# Patient Record
Sex: FEMALE | Race: WHITE | NOT HISPANIC OR LATINO | Employment: OTHER | URBAN - METROPOLITAN AREA
[De-identification: names, ages, dates, MRNs, and addresses within clinical notes are randomized per-mention and may not be internally consistent; named-entity substitution may affect disease eponyms.]

---

## 2019-01-02 ENCOUNTER — APPOINTMENT (OUTPATIENT)
Dept: GENERAL RADIOLOGY | Facility: HOSPITAL | Age: 84
End: 2019-01-02

## 2019-01-02 ENCOUNTER — APPOINTMENT (OUTPATIENT)
Dept: CT IMAGING | Facility: HOSPITAL | Age: 84
End: 2019-01-02

## 2019-01-02 ENCOUNTER — HOSPITAL ENCOUNTER (EMERGENCY)
Facility: HOSPITAL | Age: 84
Discharge: HOME | End: 2019-01-02
Attending: EMERGENCY MEDICINE | Admitting: EMERGENCY MEDICINE

## 2019-01-02 VITALS
HEIGHT: 64 IN | BODY MASS INDEX: 23.05 KG/M2 | OXYGEN SATURATION: 93 % | WEIGHT: 135 LBS | DIASTOLIC BLOOD PRESSURE: 86 MMHG | RESPIRATION RATE: 17 BRPM | TEMPERATURE: 98.2 F | HEART RATE: 75 BPM | SYSTOLIC BLOOD PRESSURE: 156 MMHG

## 2019-01-02 DIAGNOSIS — S02.2XXA CLOSED FRACTURE OF NASAL BONE, INITIAL ENCOUNTER: ICD-10-CM

## 2019-01-02 DIAGNOSIS — T07.XXXA MULTIPLE CONTUSIONS: ICD-10-CM
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
## 2019-01-02 PROCEDURE — 99284 EMERGENCY DEPT VISIT MOD MDM: CPT

## 2019-01-02 PROCEDURE — 72125 CT NECK SPINE W/O DYE: CPT

## 2019-01-02 PROCEDURE — 73110 X-RAY EXAM OF WRIST: CPT

## 2019-01-02 PROCEDURE — 25010000002 TDAP 5-2.5-18.5 LF-MCG/0.5 SUSPENSION: Performed by: EMERGENCY MEDICINE

## 2019-01-02 PROCEDURE — 70486 CT MAXILLOFACIAL W/O DYE: CPT

## 2019-01-02 PROCEDURE — 73130 X-RAY EXAM OF HAND: CPT

## 2019-01-02 PROCEDURE — 70450 CT HEAD/BRAIN W/O DYE: CPT

## 2019-01-02 PROCEDURE — 90715 TDAP VACCINE 7 YRS/> IM: CPT | Performed by: EMERGENCY MEDICINE

## 2019-01-02 PROCEDURE — 90471 IMMUNIZATION ADMIN: CPT | Performed by: EMERGENCY MEDICINE

## 2019-01-02 RX ORDER — OMEPRAZOLE 20 MG/1
20 CAPSULE, DELAYED RELEASE ORAL DAILY
Status: ON HOLD | COMMUNITY
End: 2022-09-11

## 2019-01-02 RX ORDER — BACITRACIN, NEOMYCIN, POLYMYXIN B 400; 3.5; 5 [USP'U]/G; MG/G; [USP'U]/G
1 OINTMENT TOPICAL ONCE
Status: COMPLETED | OUTPATIENT
Start: 2019-01-02 | End: 2019-01-02

## 2019-01-02 RX ORDER — PANTOPRAZOLE SODIUM 40 MG/1
40 TABLET, DELAYED RELEASE ORAL DAILY
Status: ON HOLD | COMMUNITY
End: 2022-09-11

## 2019-01-02 RX ORDER — AMLODIPINE BESYLATE 2.5 MG/1
2.5 TABLET ORAL DAILY
Status: ON HOLD | COMMUNITY
End: 2022-09-11

## 2019-01-02 RX ADMIN — BACITRACIN, NEOMYCIN, POLYMYXIN B 1 G: 400; 3.5; 5 OINTMENT TOPICAL at 21:43

## 2019-01-02 RX ADMIN — TETANUS TOXOID, REDUCED DIPHTHERIA TOXOID AND ACELLULAR PERTUSSIS VACCINE, ADSORBED 0.5 ML: 5; 2.5; 8; 8; 2.5 SUSPENSION INTRAMUSCULAR at 20:08

## 2019-01-03 NOTE — ED PROVIDER NOTES
Subjective   89-year-old white female arrives from [facility] complaining of injury sustained in fall.  Patient states she was walking in her permanent when she tripped and fell.  She is not sure how it occurred but she denies any syncope or loss of consciousness.  According to her daughter, she is supposed to use her walker and at this particular incident she did not use her walker.  She denies any other areas of complaint except her nose, her right wrist, and to some extent her neck.  She denies any chest pain, hip pain, low back pain, or other extremity complaints including her lower or upper extremities.  Patient has no other complaints.        Fall   Mechanism of injury: fall    Injury location:  Face  Facial injury location:  Nose  Incident location:  Home  Arrived directly from scene: yes    Fall:     Fall occurred:  Walking    Height of fall:  Self    Impact surface:  Unable to specify    Point of impact:  Face    Entrapped after fall: no    Suspicion of alcohol use: no    Suspicion of drug use: no    Tetanus status:  Unknown  Prior to arrival data:     Bystander interventions:  None    Loss of consciousness: no    Associated symptoms: neck pain    Associated symptoms: no abdominal pain, no back pain, no chest pain, no headaches, no loss of consciousness and no vomiting        Review of Systems   Cardiovascular: Negative for chest pain.   Gastrointestinal: Negative for abdominal pain and vomiting.   Musculoskeletal: Positive for neck pain. Negative for back pain.   Neurological: Negative for loss of consciousness and headaches.   All other systems reviewed and are negative.      Past Medical History:   Diagnosis Date   • Depression    • Disease of thyroid gland    • Hypertension        Allergies   Allergen Reactions   • Levaquin [Levofloxacin] Other (See Comments)     Other     • Penicillins Other (See Comments)     Other     • Sulfa Antibiotics Other (See Comments)     Other          No past surgical history on file.    No family history on file.    Social History     Socioeconomic History   • Marital status:      Spouse name: Not on file   • Number of children: Not on file   • Years of education: Not on file   • Highest education level: Not on file           Objective   Physical Exam   Constitutional: She appears well-developed and well-nourished.   HENT:   Head: Normocephalic.   Right Ear: External ear normal.   Left Ear: External ear normal.   Nasal nasal swelling.  There is no septal hematoma.  There is no active bleeding.  Teeth show no fracture or avulsion.  There is no malocclusion.  No active bleeding.  Facial and oral exam otherwise normal.   Eyes: Conjunctivae are normal. Pupils are equal, round, and reactive to light.   Neck: Normal range of motion. Neck supple.   Nontender C-spine   Cardiovascular: Normal rate, regular rhythm and normal heart sounds.   No murmur heard.  Pulmonary/Chest: Effort normal and breath sounds normal. No stridor. No respiratory distress.   Abdominal: Soft. Bowel sounds are normal. She exhibits no distension. There is no tenderness.   Musculoskeletal:   Right distal radius there is a 2 cm laceration at the radial aspect of the distal one third of the right wrist and forearm area.  There is no active bleeding or foreign body.  Neurovascular status normal pulses 4+ at radius.  There is normal range of motion.  Radial, ulnar, median sensory motor nerve intact.  The remainder of the upper extremity exam is normal.  Lower extremities there is no tenderness or deformity at hips.  There is normal range of motion at hips and knees.  There is no area of tenderness on palpation.  Neurovascular status is normal to both extremities.  Back: There is no vertebral tenderness whatsoever.   Neurological: She is alert.   Skin: Skin is warm and dry. Capillary refill takes less than 2 seconds.   Psychiatric: She has a normal mood and affect. Her behavior is normal.    Nursing note and vitals reviewed.      Laceration Repair  Date/Time: 1/2/2019 9:23 PM  Performed by: Mitchell Neal PA  Authorized by: Uli Gore MD     Consent:     Consent obtained:  Verbal    Consent given by:  Patient  Anesthesia (see MAR for exact dosages):     Anesthesia method:  Local infiltration    Local anesthetic:  Lidocaine 1% w/o epi  Laceration details:     Location:  Shoulder/arm    Shoulder/arm location:  R lower arm    Length (cm):  2.5  Repair type:     Repair type:  Simple  Pre-procedure details:     Preparation:  Patient was prepped and draped in usual sterile fashion and imaging obtained to evaluate for foreign bodies  Exploration:     Wound exploration: wound explored through full range of motion      Wound extent: no muscle damage noted, no nerve damage noted, no tendon damage noted and no vascular damage noted      Contaminated: no    Treatment:     Area cleansed with:  Betadine    Amount of cleaning:  Standard    Irrigation method:  Tap  Skin repair:     Repair method:  Sutures    Suture size:  5-0    Suture material:  Prolene    Suture technique:  Simple interrupted    Number of sutures:  5  Approximation:     Approximation:  Close    Vermilion border: well-aligned    Post-procedure details:     Dressing:  Antibiotic ointment    Patient tolerance of procedure:  Tolerated well, no immediate complications  Comments:      Pt with no complications               ED Course          No results found for this or any previous visit (from the past 24 hour(s)).  Note: In addition to lab results from this visit, the labs listed above may include labs taken at another facility or during a different encounter within the last 24 hours. Please correlate lab times with ED admission and discharge times for further clarification of the services performed during this visit.    XR Hand 3+ View Right   Final Result   1. No acute fracture or dislocation of the right hand.    2. Mild soft tissue swelling of  the lateral aspect of the distal forearm.    3. Mild erosive changes identified at the base of the distal fifth phalanx,    suggestive of erosive arthropathy. Additional degenerative change/arthropathy    is noted above.    4. Additional findings described above.      THIS DOCUMENT HAS BEEN ELECTRONICALLY SIGNED BY BINDU YODER MD      XR Wrist 3+ View Right   Final Result   1. No acute fracture or dislocation of the right wrist.    2. 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 Additional degenerative change/arthropathy is    noted above.    3. A tiny mineralized loose body or calcification identified distal to the    ulna.    4. Cystic change and cortical irregularity is identified of the ulnar styloid    process, which is likely degenerative or due to prior trauma.    5. Mild soft tissue swelling of the lateral aspect of the distal forearm.      THIS DOCUMENT HAS BEEN ELECTRONICALLY SIGNED BY BINDU YODER MD      CT Cervical Spine Without Contrast   Preliminary Result   1. No acute intracranial abnormality.    2. Comminuted fracture of the nasal bones and fracture of the bony nasal   septum. Fracture of the anterior nasal spine of the maxilla.   3. Cervical spine degenerative change without acute fracture.       DICTATED:   1/2/2019   EDITED/ls :   1/2/2019           CT Facial Bones Without Contrast   Preliminary Result   1. No acute intracranial abnormality.    2. Comminuted fracture of the nasal bones and fracture of the bony nasal   septum. Fracture of the anterior nasal spine of the maxilla.   3. Cervical spine degenerative change without acute fracture.       DICTATED:   1/2/2019   EDITED/ls :   1/2/2019           CT Head Without Contrast   Preliminary Result   1. No acute intracranial abnormality.    2. Comminuted fracture of the nasal bones and fracture of the bony nasal   septum. Fracture of the anterior nasal spine of the maxilla.    3. Cervical spine degenerative change without acute fracture.       DICTATED:   1/2/2019   EDITED/ls :   1/2/2019             Vitals:    01/02/19 2000 01/02/19 2010 01/02/19 2100 01/02/19 2127   BP: 170/83  156/86    Pulse:  77  75   Resp:       Temp:       TempSrc:       SpO2: 94%  93%    Weight:       Height:         Medications   neomycin-bacitracin-polymyxin (NEOSPORIN) ointment 1 g (not administered)   Tdap (BOOSTRIX) injection 0.5 mL (0.5 mL Intramuscular Given 1/2/19 2008)     ECG/EMG Results (last 24 hours)     ** No results found for the last 24 hours. **              [facility]      Final diagnoses:   Fall, initial encounter   Multiple contusions   Closed fracture of nasal bone, initial encounter            Mitchell Neal PA  01/02/19 2134

## 2019-01-03 NOTE — DISCHARGE INSTRUCTIONS
Use Tylenol 325 mg every 6 hours as needed for pain.  You may also use Motrin 400 mg every 6 hours as needed for pain.  Discontinue this medication if stomach upset or gastrointestinal bleeding.  You may also use hydrocodone that I have supplied you if no improvement with Motrin or Tylenol.  Follow-up with your doctor as needed.  Return if pain worsens or problems sooner.

## 2022-09-11 ENCOUNTER — APPOINTMENT (OUTPATIENT)
Dept: GENERAL RADIOLOGY | Facility: HOSPITAL | Age: 87
End: 2022-09-11

## 2022-09-11 ENCOUNTER — APPOINTMENT (OUTPATIENT)
Dept: CT IMAGING | Facility: HOSPITAL | Age: 87
End: 2022-09-11

## 2022-09-11 ENCOUNTER — HOSPITAL ENCOUNTER (INPATIENT)
Facility: HOSPITAL | Age: 87
LOS: 5 days | Discharge: NURSING, REHAB OR LONG-TERM CARE | End: 2022-09-16
Attending: EMERGENCY MEDICINE | Admitting: PEDIATRICS

## 2022-09-11 DIAGNOSIS — I63.511 ACUTE ISCHEMIC RIGHT MCA STROKE: Primary | ICD-10-CM

## 2022-09-11 DIAGNOSIS — Z66 DNR (DO NOT RESUSCITATE): ICD-10-CM

## 2022-09-11 DIAGNOSIS — I10 ELEVATED BLOOD PRESSURE READING WITH DIAGNOSIS OF HYPERTENSION: ICD-10-CM

## 2022-09-11 PROBLEM — I63.9 ACUTE CVA (CEREBROVASCULAR ACCIDENT): Status: ACTIVE | Noted: 2022-09-11

## 2022-09-11 PROBLEM — F32.A MILD DEPRESSION: Status: ACTIVE | Noted: 2022-09-11

## 2022-09-11 PROBLEM — E78.5 HLD (HYPERLIPIDEMIA): Status: ACTIVE | Noted: 2022-09-11

## 2022-09-11 PROBLEM — R53.1 ACUTE LEFT-SIDED WEAKNESS: Status: ACTIVE | Noted: 2022-09-11

## 2022-09-11 PROBLEM — E03.9 HYPOTHYROIDISM (ACQUIRED): Status: ACTIVE | Noted: 2022-09-11

## 2022-09-11 LAB
ALT SERPL W P-5'-P-CCNC: 11 U/L (ref 1–33)
APTT PPP: 31.4 SECONDS (ref 22–39)
AST SERPL-CCNC: 23 U/L (ref 1–32)
BACTERIA UR QL AUTO: ABNORMAL /HPF
BASOPHILS # BLD AUTO: 0.06 10*3/MM3 (ref 0–0.2)
BASOPHILS NFR BLD AUTO: 0.7 % (ref 0–1.5)
BILIRUB UR QL STRIP: NEGATIVE
BUN BLDA-MCNC: 19 MG/DL (ref 8–26)
CA-I BLDA-SCNC: 1.33 MMOL/L (ref 1.2–1.32)
CHLORIDE BLDA-SCNC: 101 MMOL/L (ref 98–109)
CLARITY UR: CLEAR
CO2 BLDA-SCNC: 28 MMOL/L (ref 24–29)
COLOR UR: YELLOW
CREAT BLDA-MCNC: 0.6 MG/DL (ref 0.6–1.3)
DEPRECATED RDW RBC AUTO: 45.1 FL (ref 37–54)
EGFRCR SERPLBLD CKD-EPI 2021: 82.3 ML/MIN/1.73
EOSINOPHIL # BLD AUTO: 0.15 10*3/MM3 (ref 0–0.4)
EOSINOPHIL NFR BLD AUTO: 1.8 % (ref 0.3–6.2)
ERYTHROCYTE [DISTWIDTH] IN BLOOD BY AUTOMATED COUNT: 14 % (ref 12.3–15.4)
GLUCOSE BLDC GLUCOMTR-MCNC: 104 MG/DL (ref 70–130)
GLUCOSE BLDC GLUCOMTR-MCNC: 157 MG/DL (ref 70–130)
GLUCOSE UR STRIP-MCNC: NEGATIVE MG/DL
HCT VFR BLD AUTO: 40.4 % (ref 34–46.6)
HCT VFR BLDA CALC: 42 % (ref 38–51)
HGB BLD-MCNC: 13.2 G/DL (ref 12–15.9)
HGB BLDA-MCNC: 14.3 G/DL (ref 12–17)
HGB UR QL STRIP.AUTO: NEGATIVE
HOLD SPECIMEN: NORMAL
HYALINE CASTS UR QL AUTO: ABNORMAL /LPF
IMM GRANULOCYTES # BLD AUTO: 0.02 10*3/MM3 (ref 0–0.05)
IMM GRANULOCYTES NFR BLD AUTO: 0.2 % (ref 0–0.5)
INR PPP: 1.3 (ref 0.8–1.2)
KETONES UR QL STRIP: NEGATIVE
LEUKOCYTE ESTERASE UR QL STRIP.AUTO: ABNORMAL
LYMPHOCYTES # BLD AUTO: 1.2 10*3/MM3 (ref 0.7–3.1)
LYMPHOCYTES NFR BLD AUTO: 14.4 % (ref 19.6–45.3)
MCH RBC QN AUTO: 28.6 PG (ref 26.6–33)
MCHC RBC AUTO-ENTMCNC: 32.7 G/DL (ref 31.5–35.7)
MCV RBC AUTO: 87.4 FL (ref 79–97)
MONOCYTES # BLD AUTO: 0.44 10*3/MM3 (ref 0.1–0.9)
MONOCYTES NFR BLD AUTO: 5.3 % (ref 5–12)
NEUTROPHILS NFR BLD AUTO: 6.46 10*3/MM3 (ref 1.7–7)
NEUTROPHILS NFR BLD AUTO: 77.6 % (ref 42.7–76)
NITRITE UR QL STRIP: NEGATIVE
NRBC BLD AUTO-RTO: 0 /100 WBC (ref 0–0.2)
PH UR STRIP.AUTO: 6.5 [PH] (ref 5–8)
PLATELET # BLD AUTO: 282 10*3/MM3 (ref 140–450)
PMV BLD AUTO: 9.8 FL (ref 6–12)
POTASSIUM BLDA-SCNC: 4 MMOL/L (ref 3.5–4.9)
PROT UR QL STRIP: NEGATIVE
PROTHROMBIN TIME: 15 SECONDS (ref 12.8–15.2)
RBC # BLD AUTO: 4.62 10*6/MM3 (ref 3.77–5.28)
RBC # UR STRIP: ABNORMAL /HPF
REF LAB TEST METHOD: ABNORMAL
SODIUM BLD-SCNC: 140 MMOL/L (ref 138–146)
SP GR UR STRIP: 1.06 (ref 1–1.03)
SQUAMOUS #/AREA URNS HPF: ABNORMAL /HPF
TROPONIN T SERPL-MCNC: <0.01 NG/ML (ref 0–0.03)
UROBILINOGEN UR QL STRIP: ABNORMAL
WBC # UR STRIP: ABNORMAL /HPF
WBC NRBC COR # BLD: 8.33 10*3/MM3 (ref 3.4–10.8)
WHOLE BLOOD HOLD COAG: NORMAL
WHOLE BLOOD HOLD SPECIMEN: NORMAL

## 2022-09-11 PROCEDURE — 82962 GLUCOSE BLOOD TEST: CPT

## 2022-09-11 PROCEDURE — 99223 1ST HOSP IP/OBS HIGH 75: CPT | Performed by: NURSE PRACTITIONER

## 2022-09-11 PROCEDURE — 85014 HEMATOCRIT: CPT

## 2022-09-11 PROCEDURE — 84460 ALANINE AMINO (ALT) (SGPT): CPT | Performed by: EMERGENCY MEDICINE

## 2022-09-11 PROCEDURE — 70496 CT ANGIOGRAPHY HEAD: CPT

## 2022-09-11 PROCEDURE — 0042T HC CT CEREBRAL PERFUSION W/WO CONTRAST: CPT

## 2022-09-11 PROCEDURE — 71045 X-RAY EXAM CHEST 1 VIEW: CPT

## 2022-09-11 PROCEDURE — 85025 COMPLETE CBC W/AUTO DIFF WBC: CPT | Performed by: EMERGENCY MEDICINE

## 2022-09-11 PROCEDURE — 93005 ELECTROCARDIOGRAM TRACING: CPT | Performed by: EMERGENCY MEDICINE

## 2022-09-11 PROCEDURE — 84484 ASSAY OF TROPONIN QUANT: CPT | Performed by: EMERGENCY MEDICINE

## 2022-09-11 PROCEDURE — 99285 EMERGENCY DEPT VISIT HI MDM: CPT

## 2022-09-11 PROCEDURE — 99223 1ST HOSP IP/OBS HIGH 75: CPT | Performed by: HOSPITALIST

## 2022-09-11 PROCEDURE — 4A03X5D MEASUREMENT OF ARTERIAL FLOW, INTRACRANIAL, EXTERNAL APPROACH: ICD-10-PCS | Performed by: RADIOLOGY

## 2022-09-11 PROCEDURE — 85610 PROTHROMBIN TIME: CPT

## 2022-09-11 PROCEDURE — 85730 THROMBOPLASTIN TIME PARTIAL: CPT | Performed by: EMERGENCY MEDICINE

## 2022-09-11 PROCEDURE — 70498 CT ANGIOGRAPHY NECK: CPT

## 2022-09-11 PROCEDURE — 81001 URINALYSIS AUTO W/SCOPE: CPT | Performed by: EMERGENCY MEDICINE

## 2022-09-11 PROCEDURE — 70450 CT HEAD/BRAIN W/O DYE: CPT

## 2022-09-11 PROCEDURE — 0 IOPAMIDOL PER 1 ML: Performed by: EMERGENCY MEDICINE

## 2022-09-11 PROCEDURE — 80047 BASIC METABLC PNL IONIZED CA: CPT

## 2022-09-11 PROCEDURE — 84450 TRANSFERASE (AST) (SGOT): CPT | Performed by: EMERGENCY MEDICINE

## 2022-09-11 RX ORDER — ATORVASTATIN CALCIUM 40 MG/1
80 TABLET, FILM COATED ORAL NIGHTLY
Status: DISCONTINUED | OUTPATIENT
Start: 2022-09-11 | End: 2022-09-12

## 2022-09-11 RX ORDER — SODIUM CHLORIDE 0.9 % (FLUSH) 0.9 %
10 SYRINGE (ML) INJECTION EVERY 12 HOURS SCHEDULED
Status: DISCONTINUED | OUTPATIENT
Start: 2022-09-11 | End: 2022-09-16 | Stop reason: HOSPADM

## 2022-09-11 RX ORDER — SODIUM CHLORIDE 0.9 % (FLUSH) 0.9 %
10 SYRINGE (ML) INJECTION AS NEEDED
Status: DISCONTINUED | OUTPATIENT
Start: 2022-09-11 | End: 2022-09-16 | Stop reason: HOSPADM

## 2022-09-11 RX ORDER — PANTOPRAZOLE SODIUM 40 MG/1
40 TABLET, DELAYED RELEASE ORAL EVERY MORNING
Status: DISCONTINUED | OUTPATIENT
Start: 2022-09-12 | End: 2022-09-16 | Stop reason: HOSPADM

## 2022-09-11 RX ORDER — ACETAMINOPHEN 325 MG/1
650 TABLET ORAL EVERY 4 HOURS PRN
Status: DISCONTINUED | OUTPATIENT
Start: 2022-09-11 | End: 2022-09-16 | Stop reason: HOSPADM

## 2022-09-11 RX ORDER — ASPIRIN 300 MG/1
300 SUPPOSITORY RECTAL ONCE
Status: COMPLETED | OUTPATIENT
Start: 2022-09-11 | End: 2022-09-11

## 2022-09-11 RX ORDER — ONDANSETRON 4 MG/1
4 TABLET, FILM COATED ORAL EVERY 6 HOURS PRN
Status: DISCONTINUED | OUTPATIENT
Start: 2022-09-11 | End: 2022-09-16 | Stop reason: HOSPADM

## 2022-09-11 RX ORDER — ONDANSETRON 2 MG/ML
4 INJECTION INTRAMUSCULAR; INTRAVENOUS EVERY 6 HOURS PRN
Status: DISCONTINUED | OUTPATIENT
Start: 2022-09-11 | End: 2022-09-16 | Stop reason: HOSPADM

## 2022-09-11 RX ORDER — LABETALOL HYDROCHLORIDE 5 MG/ML
INJECTION, SOLUTION INTRAVENOUS
Status: COMPLETED
Start: 2022-09-11 | End: 2022-09-11

## 2022-09-11 RX ORDER — SERTRALINE HYDROCHLORIDE 25 MG/1
25 TABLET, FILM COATED ORAL DAILY
COMMUNITY

## 2022-09-11 RX ORDER — ASPIRIN 81 MG/1
81 TABLET, CHEWABLE ORAL DAILY
Status: DISCONTINUED | OUTPATIENT
Start: 2022-09-12 | End: 2022-09-16 | Stop reason: HOSPADM

## 2022-09-11 RX ORDER — ACETAMINOPHEN 160 MG/5ML
650 SOLUTION ORAL EVERY 4 HOURS PRN
Status: DISCONTINUED | OUTPATIENT
Start: 2022-09-11 | End: 2022-09-13

## 2022-09-11 RX ORDER — ASPIRIN 300 MG/1
300 SUPPOSITORY RECTAL DAILY
Status: DISCONTINUED | OUTPATIENT
Start: 2022-09-12 | End: 2022-09-13

## 2022-09-11 RX ORDER — LABETALOL HYDROCHLORIDE 5 MG/ML
10 INJECTION, SOLUTION INTRAVENOUS ONCE
Status: COMPLETED | OUTPATIENT
Start: 2022-09-11 | End: 2022-09-11

## 2022-09-11 RX ORDER — ACETAMINOPHEN 650 MG/1
650 SUPPOSITORY RECTAL EVERY 4 HOURS PRN
Status: DISCONTINUED | OUTPATIENT
Start: 2022-09-11 | End: 2022-09-13

## 2022-09-11 RX ORDER — HYDRALAZINE HYDROCHLORIDE 20 MG/ML
10 INJECTION INTRAMUSCULAR; INTRAVENOUS EVERY 6 HOURS PRN
Status: DISCONTINUED | OUTPATIENT
Start: 2022-09-11 | End: 2022-09-16 | Stop reason: HOSPADM

## 2022-09-11 RX ADMIN — LABETALOL 20 MG/4 ML (5 MG/ML) INTRAVENOUS SYRINGE 10 MG: at 13:46

## 2022-09-11 RX ADMIN — IOPAMIDOL 100 ML: 755 INJECTION, SOLUTION INTRAVENOUS at 13:47

## 2022-09-11 RX ADMIN — LABETALOL HYDROCHLORIDE 10 MG: 5 INJECTION, SOLUTION INTRAVENOUS at 13:46

## 2022-09-11 RX ADMIN — NICARDIPINE HYDROCHLORIDE 5 MG/HR: 25 INJECTION, SOLUTION INTRAVENOUS at 15:42

## 2022-09-11 RX ADMIN — ASPIRIN 300 MG: 300 SUPPOSITORY RECTAL at 15:43

## 2022-09-11 NOTE — CONSULTS
Stroke Consult Note    Patient Name: Nehal Parker   MRN: [id number]  Age: 89 y.o.  Sex: female  : 2/15/1926    Primary Care Physician: Brenton Clay MD  Referring Physician: Dr. Dick    TIME STROKE TEAM CALLED: 1320 EST     TIME PATIENT SEEN: 1325 EST    Handedness: Right  Race:     Chief Complaint/Reason for Consultation: Left facial droop, left-sided weakness, slurred speech    HPI:Nehal Parker is a 89-year-old female with a PMH significant for TIA, PE, HTN, HLD, depression, and hypothyroidism who presents to the [facility] ED with complaints of left facial droop, left-sided weakness and slurred speech.  Patient lives in assisted living facility.  The patient's daughter states that she called to check on her mom at 1130 this morning and noticed that her speech was very slurred.  She drove to check on her and noticed that she had facial droop, was drooling out of the left side of her mouth, had slurred speech and left-sided weakness.  EMS was called and she was brought to [facility] for further evaluation.  Per EMS, she was noted to be hypertensive in route with systolic blood pressure in the 200s.    On arrival to [facility], BP is 194/96.  NIH is 6.  CT head with no definitive acute abnormality.  CTP with an area of abnormal perfusion in the RMCA territory.  12 mL of core infarct noted with 27 mL of ischemic penumbra.  CTA H/N with multiple areas of high-grade narrowing or occlusion and the distal M2 branches of the distal RMCA superior division.  Multiple focal high-grade stenosis noted in the bilateral PCA arteries.  Imaging discussed with Dr. Pendleton, Dr. Madrid, the patient, and her daughters.  Patient's baseline MRS Is 3.  Risk of intervention is greater than the benefit.  We will pursue medical management.  Patient will be admitted to the hospital medicine service for further evaluation.    Of note, the patient was recently taken off Xarelto in the last 3 months due to increased falls at home.  She  was on Xarelto for history of multiple pulmonary emboli.     Last Known Normal Date/Time: Unknown EST     Review of Systems   Constitutional: Negative for chills and fever.   HENT: Positive for trouble swallowing.    Eyes: Negative for photophobia and visual disturbance.   Respiratory: Negative for cough and shortness of breath.    Gastrointestinal: Negative for nausea and vomiting.   Genitourinary: Negative.    Musculoskeletal: Positive for gait problem.   Neurological: Positive for facial asymmetry, speech difficulty and weakness.   Psychiatric/Behavioral: Negative.       Past Medical History:   Diagnosis Date   • Depression    • Disease of thyroid gland    • Hypertension      No past surgical history on file.  No family history on file.  Social History     Socioeconomic History   • Marital status:      Allergies   Allergen Reactions   • Levaquin [Levofloxacin] Other (See Comments)     Other     • Penicillins Other (See Comments)     Other     • Sulfa Antibiotics Other (See Comments)     Other       Prior to Admission medications    Medication Sig Start Date End Date Taking? Authorizing Provider   amLODIPine (NORVASC) 2.5 MG tablet Take 2.5 mg by mouth Daily.    Rome Curtis MD   Escitalopram Oxalate (LEXAPRO PO) Take  by mouth.    Rome Curtis MD   HYDROcodone-acetaminophen (HYCET) 7.5-325 MG/15ML solution Take 5 mL by mouth Every 6 (Six) Hours As Needed for Moderate Pain . 1/2/19   Uli Gore MD   LEVOTHYROXINE SODIUM PO Take  by mouth.    Rome Curtis MD   omeprazole (priLOSEC) 20 MG capsule Take 20 mg by mouth Daily.    Rome Curtis MD   pantoprazole (PROTONIX) 40 MG EC tablet Take 40 mg by mouth Daily.    Rome Curtis MD   SIMVASTATIN PO Take  by mouth.    Rome Curtis MD         Temp:  [98 °F (36.7 °C)] 98 °F (36.7 °C)  Heart Rate:  [62-67] 67  Resp:  [18] 18  BP: (194-213)/() 213/99  Neurological Exam  Mental Status  Awake, alert and  oriented to person, place and time.Alert. Oriented to person, place, and time. Mild dysarthria present. Expressive aphasia present. Mild intermittent word finding difficulty.    Cranial Nerves  CN II: Visual acuity is normal. Visual fields full to confrontation.  CN III, IV, VI: Extraocular movements intact bilaterally. Normal lids and orbits bilaterally. Pupils equal round and reactive to light bilaterally.  CN V: Facial sensation is normal.  CN VII:  Left: There is central facial weakness.  CN IX, X: Palate elevates symmetrically  CN XI: Shoulder shrug strength is normal.  CN XII: Tongue midline without atrophy or fasciculations.    Motor   Strength is 5/5 in all four extremities except as noted.  LUE drift, weakness noted with dorsiflexion of the left foot.    Reflexes                                            Right                      Left  Plantar                           Downgoing                Downgoing    Coordination  Right: Finger-to-nose normal.Left: Finger-to-nose normal.    Gait    Not observed.      Physical Exam  Constitutional:       Comments: Frail elderly female in NAD   Eyes:      General: Lids are normal.      Extraocular Movements: Extraocular movements intact.      Pupils: Pupils are equal, round, and reactive to light.   Cardiovascular:      Rate and Rhythm: Normal rate and regular rhythm.   Pulmonary:      Effort: Pulmonary effort is normal. No respiratory distress.   Abdominal:      General: There is no distension.      Palpations: Abdomen is soft.   Musculoskeletal:      Cervical back: Normal range of motion and neck supple.      Right lower leg: No edema.      Left lower leg: No edema.   Skin:     General: Skin is warm and dry.      Capillary Refill: Capillary refill takes less than 2 seconds.   Neurological:      Mental Status: She is alert and oriented to person, place, and time.      Cranial Nerves: Cranial nerve deficit and dysarthria present.      Motor: Weakness present.    Psychiatric:         Mood and Affect: Mood normal.         Behavior: Behavior normal.         Acute Stroke Data    Thrombolytic Inclusion / Exclusion Criteria    Time: 1324 EDT  Person Administering Scale: ZEYNEP Garces    Inclusion Criteria  [x]   18 years of age or greater   []   Onset of symptoms < 4.5 hours before beginning treatment (stroke onset = time patient was last seen well or without symptoms).   []   Diagnosis of acute ischemic stroke causing measurable disabling deficit (Complete Hemianopia, Any Aphasia, Visual or Sensory Extinction, Any weakness limiting sustained effort against gravity)   []   Any remaining deficit considered potentially disabling in view of patient and practitioner   Exclusion criteria (Do not proceed with Alteplase if any are checked under exclusion criteria)  [x]   Onset unknown or GREATER than 4.5 hours   []   ICH on CT/MRI   []   CT demonstrates hypodensity representing acute or subacute infarct   []   Significant head trauma or prior stroke in the previous 3 months   []   Symptoms suggestive of subarachnoid hemorrhage   []   History of un-ruptured intracranial aneurysm GREATER than 10 mm   []   Recent intracranial or intraspinal surgery within the last 3 months   []   Arterial puncture at a non-compressible site in the previous 7 days   []   Active internal bleeding   []   Acute bleeding tendency   []   Platelet count LESS than 100,000 for known hematological diseases such as leukemia, thrombocytopenia or chronic cirrhosis   []   Current use of anticoagulant with INR GREATER than 1.7 or PT GREATER than 15 seconds, aPTT GREATER than 40 seconds   []   Heparin received within 48 hours, resulting in abnormally elevated aPTT GREATER than upper limit of normal   []   Current use of direct thrombin inhibitors or direct factor Xa inhibitors in the past 48 hours   []   Elevated blood pressure refractory to treatment (systolic GREATER than 185 mm/Hg or diastolic  GREATER  than 110 mm/Hg   []   Suspected infective endocarditis and aortic arch dissection   []   Current use of therapeutic treatment dose of low-molecular-weight heparin (LMWH) within the previous 24 hours   []   Structural GI malignancy or bleed   Relative exclusion for all patients  []   Only minor non-disabling symptoms   []   Pregnancy   []   Seizure at onset with postictal residual neurological impairments   []   Major surgery or previous trauma within past 14 days   []   History of previous spontaneous ICH, intracranial neoplasm, or AV malformation   []   Postpartum (within previous 14 days)   []   Recent GI or urinary tract hemorrhage (within previous 21 days)   []   Recent acute MI (within previous 3 months)   []   History of un-ruptured intracranial aneurysm LESS than 10 mm   []   History of ruptured intracranial aneurysm   []   Blood glucose LESS than 50 mg/dL (2.7 mmol/L)   []   Dural puncture within the last 7 days   []   Known GREATER than 10 cerebral microbleeds   Additional exclusions for patients with symptoms onset between 3 and 4.5 hours.  []   Age > 80.   []   On any anticoagulants regardless of INR  >>> Warfarin (Coumadin), Heparin, Enoxaparin (Lovenox), fondaparinux (Arixtra), bivalirudin (Angiomax), Argatroban, dabigatran (Pradaxa), rivaroxaban (Xarelto), or apixaban (Eliquis)   []   Severe stroke (NIHSS > 25).   []   History of BOTH diabetes and previous ischemic stroke.   []   The risks and benefits have been discussed with the patient or family related to the administration of IV thrombolytic therapy for stroke symptoms.   []   I have discussed and reviewed the patient's case and imaging with the attending prior to IV thrombolytic therapy.    Time IV thrombolytic administered       Hospital Meds:  Scheduled- aspirin, 300 mg, Rectal, Once      Infusions- niCARdipine, 5-15 mg/hr       PRNs- hydrALAZINE  •  sodium chloride    Functional Status Prior to Current Stroke/[place] Score:   MODIFIED ELIAZRA  SCALE (to be assessed for each patient having history of stroke) []Stroke history but not assessed  []0: No symptoms at all  []1: No significant disability despite symptoms  []2: Slight disability  [x]3: Moderate disability  []4: Moderately severe disability  []5: Severe disability  []6: Death         NIH Stroke Scale  Time: 1325 EDT  Person Administering Scale: ZEYNEP Garces  Interval: baseline  1a. Level of Consciousness: 0-->Alert, keenly responsive  1b. LOC Questions: 0-->Answers both questions correctly  1c. LOC Commands: 0-->Performs both tasks correctly  2. Best Gaze: 1-->Partial gaze palsy, gaze is abnormal in one or both eyes, but forced deviation or total gaze paresis is not present  3. Visual: 0-->No visual loss  4. Facial Palsy: 2-->Partial paralysis (total or near-total paralysis of lower face)  5a. Motor Arm, Left: 1-->Drift, limb holds 90 (or 45) degrees, but drifts down before full 10 seconds, does not hit bed or other support  5b. Motor Arm, Right: 0-->No drift, limb holds 90 (or 45) degrees for full 10 secs  6a. Motor Leg, Left: 0-->No drift, leg holds 30 degree position for full 5 secs  6b. Motor Leg, Right: 0-->No drift, leg holds 30 degree position for full 5 secs  7. Limb Ataxia: 0-->Absent  8. Sensory: 0-->Normal, no sensory loss  9. Best Language: 1-->Mild-to-moderate aphasia, some obvious loss of fluency or facility of comprehension, without significant limitation on ideas expressed or form of expression. Reduction of speech and/or comprehension, however, makes conversation. . . (see row details)  10. Dysarthria: 1-->Mild-to-moderate dysarthria, patient slurs at least some words and, at worst, can be understood with some difficulty  11. Extinction and Inattention (formerly Neglect): 0-->No abnormality    Total (NIH Stroke Scale): 6  Results Reviewed:  CT Angiogram Neck    Result Date: 9/11/2022   1. No evidence of carotid artery or vertebral artery stenosis. 2. There are multiple  areas of high-grade narrowing or occlusion within the distal M2 branches of the right MCA anterior division at the upper margin of the insula. 3. Multiple focal high-grade stenoses in both posterior cerebral arteries. There is weak segmental reconstitution of the distal left PCA branches.  This report was finalized on 9/11/2022 2:34 PM by Rom Javed MD.      CT Head Without Contrast Stroke Protocol    Result Date: 9/11/2022   1. No acute intracranial abnormality. 2. Chronic small vessel ischemic change. 3. New small triangular hypodensity in the right lentiform nucleus, likely chronic lacunar infarct versus prominent perivascular space.  This report was finalized on 9/11/2022 1:35 PM by Rom Javed MD.      CT Angiogram Head w AI Analysis of LVO    Result Date: 9/11/2022   1. No evidence of carotid artery or vertebral artery stenosis. 2. There are multiple areas of high-grade narrowing or occlusion within the distal M2 branches of the right MCA anterior division at the upper margin of the insula. 3. Multiple focal high-grade stenoses in both posterior cerebral arteries. There is weak segmental reconstitution of the distal left PCA branches.  This report was finalized on 9/11/2022 2:34 PM by Rom Javed MD.      CT CEREBRAL PERFUSION WITH & WITHOUT CONTRAST    Result Date: 9/11/2022  There is evidence for 27 mL brain risk in the right MCA distribution with 12 mL central core infarct.  This report was finalized on 9/11/2022 2:18 PM by Rom Javed MD.      Lab Results   Component Value Date    CREATININE 0.60 09/11/2022    AST 23 09/11/2022    ALT 11 09/11/2022     WBC   Date Value Ref Range Status   09/11/2022 8.33 3.40 - 10.80 10*3/mm3 Final     RBC   Date Value Ref Range Status   09/11/2022 4.62 3.77 - 5.28 10*6/mm3 Final     Hemoglobin   Date Value Ref Range Status   09/11/2022 13.2 12.0 - 15.9 g/dL Final   09/11/2022 14.3 12.0 - 17.0 g/dL Final     Comment:     Serial Number: [id number]:  [id number]      Hematocrit   Date Value Ref Range Status   09/11/2022 40.4 34.0 - 46.6 % Final   09/11/2022 42 38 - 51 % Final     MCV   Date Value Ref Range Status   09/11/2022 87.4 79.0 - 97.0 fL Final     MCH   Date Value Ref Range Status   09/11/2022 28.6 26.6 - 33.0 pg Final     MCHC   Date Value Ref Range Status   09/11/2022 32.7 31.5 - 35.7 g/dL Final     RDW   Date Value Ref Range Status   09/11/2022 14.0 12.3 - 15.4 % Final     RDW-SD   Date Value Ref Range Status   09/11/2022 45.1 37.0 - 54.0 fl Final     MPV   Date Value Ref Range Status   09/11/2022 9.8 6.0 - 12.0 fL Final     Platelets   Date Value Ref Range Status   09/11/2022 282 140 - 450 10*3/mm3 Final     Neutrophil %   Date Value Ref Range Status   09/11/2022 77.6 (H) 42.7 - 76.0 % Final     Lymphocyte %   Date Value Ref Range Status   09/11/2022 14.4 (L) 19.6 - 45.3 % Final     Monocyte %   Date Value Ref Range Status   09/11/2022 5.3 5.0 - 12.0 % Final     Eosinophil %   Date Value Ref Range Status   09/11/2022 1.8 0.3 - 6.2 % Final     Basophil %   Date Value Ref Range Status   09/11/2022 0.7 0.0 - 1.5 % Final     Immature Grans %   Date Value Ref Range Status   09/11/2022 0.2 0.0 - 0.5 % Final     Neutrophils, Absolute   Date Value Ref Range Status   09/11/2022 6.46 1.70 - 7.00 10*3/mm3 Final     Lymphocytes, Absolute   Date Value Ref Range Status   09/11/2022 1.20 0.70 - 3.10 10*3/mm3 Final     Monocytes, Absolute   Date Value Ref Range Status   09/11/2022 0.44 0.10 - 0.90 10*3/mm3 Final     Eosinophils, Absolute   Date Value Ref Range Status   09/11/2022 0.15 0.00 - 0.40 10*3/mm3 Final     Basophils, Absolute   Date Value Ref Range Status   09/11/2022 0.06 0.00 - 0.20 10*3/mm3 Final     Immature Grans, Absolute   Date Value Ref Range Status   09/11/2022 0.02 0.00 - 0.05 10*3/mm3 Final     nRBC   Date Value Ref Range Status   09/11/2022 0.0 0.0 - 0.2 /100 WBC Final              Assessment/Plan:    89-year-old female with a PMH significant for TIA, PE,  HTN, HLD, depression, and hypothyroidism who presents to the [facility] ED with complaints of left facial droop, left-sided weakness and slurred speech.  Initial NIH 6.    Advanced imaging as follows:  -CT head with no definitive acute abnormality.    -CTP with an area of abnormal perfusion in the RMCA territory.  12 mL of core infarct noted with 27 mL of ischemic penumbra.    -CTA H/N with multiple areas of high-grade narrowing or occlusion and the distal M2 branches of the  RMCA superior division.  Multiple focal high-grade stenosis noted in the bilateral PCA arteries.     Patient is not a candidate for TNKase due to unknown certain last known well. Imaging discussed with Dr. Pendleton, Dr. Madrid, the patient, and her daughters.  Patient's baseline MRS Is 3.  Risk of intervention is greater than the benefit.  We will pursue medical management.  Patient will be admitted to the hospital medicine service for further evaluation.      Antiplatelet PTA: None  Anticoagulant PTA: None        AIS in the RMCA territory d/t occlusion of the distal M2 branches of the RMCA superior division  -Possible athero versus cardioembolic etiology  -Admit to telemetry  -TIA/ischemic stroke without thrombolytic therapy order set  -MRI pending  -TTE  -Aspirin 81 mg  -HLD; high-dose statin.  Lipid panel in the a.m.  -HTN; patient noted to be hypertensive in the ED.  Goal systolic blood pressure 130-180.  Nicardipine drip as needed  -Bedrest overnight  -N.p.o.  -Gentle IV fluids overnight  -PT/OT/SLP  -Plan discussed with Dr. Pendleton, Dr. Madrid, Dr. Dick, the patient, her family, and nursing staff.  Stroke neurology will continue to follow.  Please call with any questions or concerns.    ZEYNEP Garces, AGACNP-BC  September 11, 2022  15:24 EDT

## 2022-09-11 NOTE — ED PROVIDER NOTES
Subjective   PIT    The patient is a 89-year-old female presenting to the emergency department with left-sided weakness noted at 1230 today while eating lunch.  Patient has a history of hypertension and is on chronic anticoagulation, Xarelto.  EMS called and brought into the emergency department as a code stroke.  Patient evaluated in CT scanner on arrival with the stroke navigator.      History provided by:  Patient and EMS personnel  History limited by:  Mental status change      Review of Systems   Constitutional: Negative for fever.   Respiratory: Negative.    Cardiovascular: Negative.    Gastrointestinal: Negative.    Musculoskeletal: Negative.    Skin: Negative.    Neurological: Positive for facial asymmetry, speech difficulty and weakness.   Psychiatric/Behavioral: Positive for confusion.       Past Medical History:   Diagnosis Date   • Depression    • Disease of thyroid gland    • Hypertension        Allergies   Allergen Reactions   • Levaquin [Levofloxacin] Other (See Comments)     Other     • Penicillins Other (See Comments)     Other     • Sulfa Antibiotics Other (See Comments)     Other         No past surgical history on file.    No family history on file.    Social History     Socioeconomic History   • Marital status:    Tobacco Use   • Smoking status: Never Smoker   • Smokeless tobacco: Never Used   Vaping Use   • Vaping Use: Never used   Substance and Sexual Activity   • Alcohol use: Not Currently   • Drug use: Never   • Sexual activity: Not Currently           Objective   Physical Exam  Vitals and nursing note reviewed.   Constitutional:       General: She is not in acute distress.  HENT:      Head: Normocephalic.   Eyes:      Pupils: Pupils are equal, round, and reactive to light.   Cardiovascular:      Rate and Rhythm: Normal rate and regular rhythm.      Pulses: Normal pulses.   Pulmonary:      Effort: Pulmonary effort is normal. No respiratory distress.      Breath sounds: Normal breath  sounds.   Abdominal:      Palpations: Abdomen is soft.   Musculoskeletal:         General: Normal range of motion.      Cervical back: Normal range of motion.   Skin:     General: Skin is warm and dry.   Neurological:      Mental Status: She is alert.      Comments: Mild confusion.  Left-sided deficit including facial asymmetry and drift of the left arm.  Mild decrease strength on  to the left hand and plantar/dorsiflexion of left foot.  Speech is slurred.  Partial right gaze deviation.  NIH stroke scale on arrival 5.   Psychiatric:         Mood and Affect: Mood normal.         Behavior: Behavior normal.         Critical Care  Performed by: Cedrick Dick MD  Authorized by: Cedrick Dick MD     Critical care provider statement:     Critical care time (minutes):  45    Critical care was necessary to treat or prevent imminent or life-threatening deterioration of the following conditions:  CNS failure or compromise and circulatory failure    Critical care was time spent personally by me on the following activities:  Discussions with consultants, examination of patient, obtaining history from patient or surrogate, ordering and performing treatments and interventions, ordering and review of laboratory studies, ordering and review of radiographic studies and re-evaluation of patient's condition    Care discussed with: admitting provider                 ED Course  ED Course as of 09/11/22 1841   Sun Sep 11, 2022   1334 Patient evaluated on arrival in the CT scanner with the stroke navigator.  NIH stroke scale of 5.  CT scan of the head demonstrates no intracranial hemorrhage or mass.  Patient not a candidate for tPA secondary to chronic anticoagulation.  Medication ordered for high blood pressure. [RS]   1348 Further investigation finds that the patient is currently not on Xarelto and has not been taking it for about 3 years.  However, the patient's last known well was not at 1230.  The daughter who is a  nurse advised that she called her at 11-anne and the patient had slurred speech at that time.  Last known well was last evening.  Patient still not a candidate for thrombolysis. [RS]   1351 CT CEREBRAL PERFUSION WITH & WITHOUT CONTRAST  Personally reviewed the perfusion imaging demonstrating a large deficit within the right MCA territory.  See report for radiology for details. [RS]   1511 Patient with right MCA stroke in one of the distal branches.  Patient not amenable to intervention per the stroke navigator/team.  Patient to be admitted for further evaluation management.  Hospitalist messaged and agrees with the plan for admission.  All further as per the stroke navigator and team.  Of note, the patient is DNR per discussion and agreement with the family. [RS]      ED Course User Index  [RS] Cedrick Dick MD                                           MDM  Number of Diagnoses or Management Options  Acute ischemic right MCA stroke (HCC)  DNR (do not resuscitate)  Elevated blood pressure reading with diagnosis of hypertension  Diagnosis management comments: Recent Results (from the past 24 hour(s))  -POC CHEM 8:   Collection Time: 09/11/22  1:44 PM  Specimen: Blood       Result                      Value             Ref Range           Glucose                     157 (H)           70 - 130 mg/*       BUN                         19                8 - 26 mg/dL        Creatinine                  0.60              0.60 - 1.30 *       Sodium                      140               138 - 146 mm*       POC Potassium               4.0               3.5 - 4.9 mm*       Chloride                    101               98 - 109 mmo*       Total CO2                   28                24 - 29 mmol*       Hemoglobin                  14.3              12.0 - 17.0 *       Hematocrit                  42                38 - 51 %           Ionized Calcium             1.33 (H)          1.20 - 1.32 *       eGFR                         82.3              >60.0 mL/min*  -POC Protime / INR:   Collection Time: 09/11/22  1:44 PM  Specimen: Blood       Result                      Value             Ref Range           Protime                     15.0              12.8 - 15.2 *       INR                         1.3 (H)           0.8 - 1.2      -Troponin:   Collection Time: 09/11/22  1:49 PM  Specimen: Blood       Result                      Value             Ref Range           Troponin T                  <0.010            0.000 - 0.03*  -aPTT:   Collection Time: 09/11/22  1:49 PM  Specimen: Blood       Result                      Value             Ref Range           PTT                         31.4              22.0 - 39.0 *  -AST:   Collection Time: 09/11/22  1:49 PM  Specimen: Blood       Result                      Value             Ref Range           AST (SGOT)                  23                1 - 32 U/L     -ALT:   Collection Time: 09/11/22  1:49 PM  Specimen: Blood       Result                      Value             Ref Range           ALT (SGPT)                  11                1 - 33 U/L     -Green Top (Gel):   Collection Time: 09/11/22  1:49 PM       Result                      Value             Ref Range           Extra Tube                                                    Hold for add-ons.  -Lavender Top:   Collection Time: 09/11/22  1:49 PM       Result                      Value             Ref Range           Extra Tube                                                    hold for add-on  -Gold Top - SST:   Collection Time: 09/11/22  1:49 PM       Result                      Value             Ref Range           Extra Tube                                                    Hold for add-ons.  -Gray Top:   Collection Time: 09/11/22  1:49 PM       Result                      Value             Ref Range           Extra Tube                                                    Hold for add-ons.  -Light Blue Top:   Collection Time: 09/11/22   1:49 PM       Result                      Value             Ref Range           Extra Tube                                                    Hold for add-ons.  -CBC Auto Differential:   Collection Time: 09/11/22  1:49 PM  Specimen: Blood       Result                      Value             Ref Range           WBC                         8.33              3.40 - 10.80*       RBC                         4.62              3.77 - 5.28 *       Hemoglobin                  13.2              12.0 - 15.9 *       Hematocrit                  40.4              34.0 - 46.6 %       MCV                         87.4              79.0 - 97.0 *       MCH                         28.6              26.6 - 33.0 *       MCHC                        32.7              31.5 - 35.7 *       RDW                         14.0              12.3 - 15.4 %       RDW-SD                      45.1              37.0 - 54.0 *       MPV                         9.8               6.0 - 12.0 fL       Platelets                   282               140 - 450 10*       Neutrophil %                77.6 (H)          42.7 - 76.0 %       Lymphocyte %                14.4 (L)          19.6 - 45.3 %       Monocyte %                  5.3               5.0 - 12.0 %        Eosinophil %                1.8               0.3 - 6.2 %         Basophil %                  0.7               0.0 - 1.5 %         Immature Grans %            0.2               0.0 - 0.5 %         Neutrophils, Absolute       6.46              1.70 - 7.00 *       Lymphocytes, Absolute       1.20              0.70 - 3.10 *       Monocytes, Absolute         0.44              0.10 - 0.90 *       Eosinophils, Absolute       0.15              0.00 - 0.40 *       Basophils, Absolute         0.06              0.00 - 0.20 *       Immature Grans, Absolu*     0.02              0.00 - 0.05 *       nRBC                        0.0               0.0 - 0.2 /1*  -ECG 12 Lead:   Collection Time: 09/11/22  2:12 PM        Result                      Value             Ref Range           QT Interval                 466               ms                  QTC Interval                476               ms             -Urinalysis With Microscopic If Indicated (No Culture) - Urine, Catheter:   Collection Time: 09/11/22  2:18 PM  Specimen: Urine, Catheter       Result                      Value             Ref Range           Color, UA                   Yellow            Yellow, Straw       Appearance, UA              Clear             Clear               pH, UA                      6.5               5.0 - 8.0           Specific [place], UA        1.058 (H)         1.001 - 1.030       Glucose, UA                 Negative          Negative            Ketones, UA                 Negative          Negative            Bilirubin, UA               Negative          Negative            Blood, UA                   Negative          Negative            Protein, UA                 Negative          Negative            Leuk Esterase, UA           Trace (A)         Negative            Nitrite, UA                 Negative          Negative            Urobilinogen, UA            0.2 E.U./dL       0.2 - 1.0 E.*  -Urinalysis, Microscopic Only - Urine, Catheter:   Collection Time: 09/11/22  2:18 PM  Specimen: Urine, Catheter       Result                      Value             Ref Range           RBC, UA                     0-2               None Seen, 0*       WBC, UA                     3-5 (A)           None Seen, 0*       Bacteria, UA                None Seen         None Seen, T*       Squamous Epithelial Ce*     3-6 (A)           None Seen, 0*       Hyaline Casts, UA           0-6               0 - 6 /LPF          Methodology                                                   Automated Microscopy  -POC Glucose Once:   Collection Time: 09/11/22  5:16 PM  Specimen: Blood       Result                      Value             Ref Range           Glucose                      104               70 - 130 mg/*  Note: In addition to lab results from this visit, the labs listed above may include labs taken at another facility or during a different encounter within the last 24 hours. Please correlate lab times with ED admission and discharge times for further clarification of the services performed during this visit.    XR Chest 1 View   Final Result    Probable cardiomegaly with chronic changes in the lungs. No definite    acute disease.         This report was finalized on 9/11/2022 3:59 PM by Rom Javed MD.          CT Angiogram Head w AI Analysis of LVO   Final Result         1. No evidence of carotid artery or vertebral artery stenosis.    2. There are multiple areas of high-grade narrowing or occlusion within    the distal M2 branches of the right MCA anterior division at the upper    margin of the insula.    3. Multiple focal high-grade stenoses in both posterior cerebral    arteries. There is weak segmental reconstitution of the distal left PCA    branches.         This report was finalized on 9/11/2022 2:34 PM by Rom Javed MD.          CT CEREBRAL PERFUSION WITH & WITHOUT CONTRAST   Final Result    There is evidence for 27 mL brain risk in the right MCA distribution    with 12 mL central core infarct.         This report was finalized on 9/11/2022 2:18 PM by Rom Javed MD.          CT Angiogram Neck   Final Result         1. No evidence of carotid artery or vertebral artery stenosis.    2. There are multiple areas of high-grade narrowing or occlusion within    the distal M2 branches of the right MCA anterior division at the upper    margin of the insula.    3. Multiple focal high-grade stenoses in both posterior cerebral    arteries. There is weak segmental reconstitution of the distal left PCA    branches.         This report was finalized on 9/11/2022 2:34 PM by Rom Javed MD.          CT Head Without Contrast Stroke Protocol   Final Result         1. No  "acute intracranial abnormality.    2. Chronic small vessel ischemic change.    3. New small triangular hypodensity in the right lentiform nucleus,    likely chronic lacunar infarct versus prominent perivascular space.         This report was finalized on 9/11/2022 1:35 PM by Rom Javed MD.          MRI Brain Without Contrast    (Results Pending)  -------------------------------------------------------------------            09/11/22 09/11/22 09/11/22 09/11/22                      1615      1630      1645             1710            -------------------------------------------------------------------   BP:                 157/95                   (!) 185/94          BP Location:                                      Left arm           Patient Position:                                       Lying             Pulse:      71        72        70               75              Resp:                                            18              Temp:                                    97.5 °F (36.4 °C)       TempSrc:                                        Oral             SpO2:      92%       94%       93%              93%              Weight:                               60.2 kg (132 lb 11.2 oz)   Height:                                     160 cm (63\")        -------------------------------------------------------------------  Medications  sodium chloride 0.9 % flush 10 mL (has no administration in time range)  pantoprazole (PROTONIX) EC tablet 40 mg (has no administration in time range)  sodium chloride 0.9 % flush 10 mL (has no administration in time range)  sodium chloride 0.9 % flush 10 mL (has no administration in time range)  acetaminophen (TYLENOL) tablet 650 mg (has no administration in time range)    Or  acetaminophen (TYLENOL) 160 MG/5ML solution 650 mg (has no administration in time range)    Or  acetaminophen (TYLENOL) " suppository 650 mg (has no administration in time range)  ondansetron (ZOFRAN) tablet 4 mg (has no administration in time range)    Or  ondansetron (ZOFRAN) injection 4 mg (has no administration in time range)  hydrALAZINE (APRESOLINE) injection 10 mg (10 mg Intravenous Not Given 9/11/22 1644)  sodium chloride 0.9 % flush 10 mL (has no administration in time range)  sodium chloride 0.9 % flush 10 mL (has no administration in time range)  niCARdipine (CARDENE) 25mg in 250mL NS infusion (2.5 mg/hr Intravenous Restarted 9/11/22 1645)  aspirin chewable tablet 81 mg (has no administration in time range)    Or  aspirin suppository 300 mg (has no administration in time range)  atorvastatin (LIPITOR) tablet 80 mg (has no administration in time range)  labetalol (NORMODYNE,TRANDATE) injection 10 mg (10 mg Intravenous Given 9/11/22 1346)  iopamidol (ISOVUE-370) 76 % injection 100 mL (100 mL Intravenous Given 9/11/22 1347)  aspirin suppository 300 mg (300 mg Rectal Given 9/11/22 1543)  ECG/EMG Results (last 24 hours)     Procedure Component Value Units Date/Time    ECG 12 Lead ([id number]) Collected: 09/11/22 1412     Updated: 09/11/22 1413     QT Interval 466 ms      QTC Interval 476 ms     Narrative:      Test Reason : Acute Stroke Protocol (onset < 12 hrs)  Blood Pressure :   */*   mmHG  Vent. Rate :  63 BPM     Atrial Rate :  63 BPM     P-R Int : 328 ms          QRS Dur : 136 ms      QT Int : 466 ms       P-R-T Axes :  47 -27  45 degrees     QTc Int : 476 ms    Sinus rhythm with 1st degree AV block  Nonspecific intraventricular block  Abnormal ECG  No previous ECGs available    Referred By: EDMD           Confirmed By:       ECG 12 Lead   Preliminary Result    Test Reason : Acute Stroke Protocol (onset < 12 hrs)    Blood Pressure :   */*   mmHG    Vent. Rate :  63 BPM     Atrial Rate :  63 BPM       P-R Int : 328 ms          QRS Dur : 136 ms        QT Int : 466 ms       P-R-T Axes :  47 -27  45 degrees       QTc Int : 476  ms        Sinus rhythm with 1st degree AV block    Nonspecific intraventricular block    Abnormal ECG    No previous ECGs available        Referred By: EDMD           Confirmed By:             Amount and/or Complexity of Data Reviewed  Clinical lab tests: reviewed  Tests in the radiology section of CPT®: reviewed  Decide to obtain previous medical records or to obtain history from someone other than the patient: yes  Obtain history from someone other than the patient: yes  Discuss the patient with other providers: yes  Independent visualization of images, tracings, or specimens: yes    Risk of Complications, Morbidity, and/or Mortality  Presenting problems: high    Critical Care  Total time providing critical care: 30-74 minutes      Final diagnoses:   Acute ischemic right MCA stroke (HCC)   Elevated blood pressure reading with diagnosis of hypertension   DNR (do not resuscitate)       ED Disposition  ED Disposition     ED Disposition   Decision to Admit    Condition   --    Comment   Level of Care: Telemetry [5]   Diagnosis: Acute CVA (cerebrovascular accident) (HCC) [[id number]]   Admitting Physician: LEATHA YAN [[id number]]   Certification: I Certify That Inpatient Hospital Services Are Medically Necessary For Greater Than 2 Midnights               No follow-up provider specified.       Medication List      No changes were made to your prescriptions during this visit.          Cedrick Dick MD  09/11/22 0496

## 2022-09-11 NOTE — H&P
[facility] Medicine Services  HISTORY AND PHYSICAL    Patient Name: Nehal Parker  : 2/15/1926  MRN: [id number]  Primary Care Physician: Brenton Clay MD  Date of admission: 2022      Subjective   Subjective     Chief Complaint:  Slurred Speech/left facial droop/left sided weakness    HPI:  Nehal Parker is a 89 y.o. female with a past medical history of TIA, PE, HTN, HLD, Hypothyroidism and depression presented to the ED with complaints of left facial droop, left sided weakness and slurred speech. The patient lives in an assisted living facility. The patient's daughter checked on her mom at 11:30 am today and noticed her speech was slurred. She went to physically check on her and noticed facial droop with drool running out of the left side of her mouth, with slurred speech and left sided weakness. EMS was called and patient's systolic blood pressure was found to be great than 200. CT perfusion shows an area of abnormal perfusion in the RMCA. CTA head and neck shows multiple areas of high-grade narrowing or occlusion and the distal M2 branches of the distal RMCA superior division. Multiple focal high-grade stenosis were noted in the b/l PCA arteries. No intervention planned, as risk is greater than benefit. Dr. Pendleton and Dr. Cifuentes discussed this with the patient's daughter. Patient was recently taken off Xarelto (taking it for a history of multiple PE) in the last 3 months due to more falls at home.  Patient will be admitted to the hospitalist office for further evaluation.    Review of Systems   Gen- No fevers, chills  CV- No chest pain, palpitations  Resp- No cough, dyspnea  GI- No N/V/D, abd pain  +left sided facial droop and weakness  +slurred speech  All other systems reviewed and are negative.     Personal History     Past Medical History:   Diagnosis Date   • Depression    • Disease of thyroid gland    • Hypertension        No past surgical history on  file.    Family History: her family history is not on file. Patient unable to provide- parents . Otherwise pertinent FHx was reviewed and unremarkable.     Social History:    Social History     Social History Narrative   • Not on file       Medications:  Available home medication information reviewed.  (Not in a hospital admission)      Allergies   Allergen Reactions   • Levaquin [Levofloxacin] Other (See Comments)     Other     • Penicillins Other (See Comments)     Other     • Sulfa Antibiotics Other (See Comments)     Other         Objective   Objective     Vital Signs:   Temp:  [98 °F (36.7 °C)] 98 °F (36.7 °C)  Heart Rate:  [62-67] 67  Resp:  [18] 18  BP: (194-213)/() 213/99  Total (NIH Stroke Scale): 6    Physical Exam   Constitutional: Awake, alert  Eyes: PERRLA, sclerae anicteric, no conjunctival injection  HENT: NCAT, mucous membranes moist, left facial droop  Neck: Supple, no thyromegaly, no lymphadenopathy, trachea midline  Respiratory: Clear to auscultation bilaterally, nonlabored respirations   Cardiovascular: RRR, no murmurs, rubs, or gallops, palpable pedal pulses bilaterally  Gastrointestinal: Positive bowel sounds, soft, nontender, nondistended  Musculoskeletal: No bilateral ankle edema, no clubbing or cyanosis to extremities  Psychiatric: Appropriate affect, cooperative  Neurologic: Oriented x 3, Cranial Nerves grossly intact to confrontation, slurred speech, weakness slightly decreased in LUE and LLE- 3/5, 4/5 strength in RUE and RLE  Skin: No rashes    Result Review:  I have personally reviewed the results from the time of this admission to 2022 15:21 EDT and agree with these findings:  [x]  Laboratory list / accordion  []  Microbiology  [x]  Radiology  []  EKG/Telemetry   []  Cardiology/Vascular   []  Pathology  []  Old records  []  Other:    LAB RESULTS:      Lab 22  1349 22  1344   WBC 8.33  --    HEMOGLOBIN 13.2  --    HEMOGLOBIN, POC  --  14.3   HEMATOCRIT 40.4   --    HEMATOCRIT POC  --  42   PLATELETS 282  --    NEUTROS ABS 6.46  --    IMMATURE GRANS (ABS) 0.02  --    LYMPHS ABS 1.20  --    MONOS ABS 0.44  --    EOS ABS 0.15  --    MCV 87.4  --    PROTIME  --  15.0   INR  --  1.3*   APTT 31.4  --          Lab 09/11/22  1344   CREATININE 0.60   EGFR 82.3         Lab 09/11/22  1349   ALT (SGPT) 11   AST (SGOT) 23         Lab 09/11/22  1349   TROPONIN T <0.010                 UA    Urinalysis 9/11/22 9/11/22    1418 1418   Squamous Epithelial Cells, UA  3-6 (A)   Specific Gravity, UA 1.058 (A)    Ketones, UA Negative    Blood, UA Negative    Leukocytes, UA Trace (A)    Nitrite, UA Negative    RBC, UA  0-2   WBC, UA  3-5 (A)   Bacteria, UA  None Seen   (A) Abnormal value              Microbiology Results (last 10 days)     ** No results found for the last 240 hours. **          CT Angiogram Neck    Result Date: 9/11/2022  Examination: CT ANGIOGRAM NECK-, CT ANGIOGRAM HEAD W AI ANALYSIS OF LVO-  Date of Exam: 9/11/2022 1:29 PM  Indication: Stroke, follow up.  Comparison: None available.  Technique: Axial volumetric contrast-enhanced CT angiographic images of the head and neck were acquired utilizing 100 mL Isovue-370  IV contrast. 3-D reconstructions were created for interpretation. Automated exposure control and iterative reconstruction methods were used. AI analysis of LVO was utilized for the CTA Head imaging portion of the study.   Findings: Aortic arch: There is aneurysmal dilatation of the proximal descending thoracic aorta up to 33 mm. There is 3 vessel arch anatomy. The right brachiocephalic and bilateral subclavian arteries appear widely patent with minimal nonstenosing disease.  Right carotid: The CCA follows a normal course and appears normal caliber. There is minimal nonstenosing calcific plaque at the carotid bifurcation extending into the proximal ICA. ECA and distal branches appear patent. Cervical ICA is widely patent. There is mild nonstenosing plaque in the  intracranial ICA segments. There is a large patent posterior communicating artery. The origin of the ophthalmic artery is normal. There is no clear definition of an anterior communicating artery. The M1 and A1 segments appear normal. Distal VAZQUEZ branches are patent. There is high-grade narrowing and occlusion of the distal right M2 anterior division. This occurs at the anterior superior aspect of the insula. The right MCA inferior and posterior divisions distal branches appear normal.  Left carotid: The left CCA follows a normal course and appears normal caliber. The left carotid bifurcation contains mild nonstenosing plaque. The ECA and distal branches are patent. Cervical ICA and intracranial ICA segments appear widely patent. There is a patent ophthalmic artery. No definite left P-comm. The left M1 and A1 segments and distal VAZQUEZ and MCA branches appear patent.  Posterior circulation: Vertebral arteries are codominant. The left vertebral artery appears tortuous. The vertebral arteries appear patent throughout the neck and into the head. The V4 segments, basilar artery, superior cerebellar arteries and P1 segments appear patent. There is focal high-grade stenosis in the right P2 segment with similar finding at the right. 3/T4 junction. There is adequate distal reconstitution. There is moderate focal stenosis in the left P2 segment and a high-grade stenosis or occlusion in the left P3 segment with weak segmental distal reconstitution.. There is no evidence of acute intracranial hemorrhage. There is thinning of the orbital lenses bilaterally. There is no evidence of a neck mass or adenopathy. There is mild scarring in the lung apices. There are moderate cervical degenerative changes.      Impression:  1. No evidence of carotid artery or vertebral artery stenosis. 2. There are multiple areas of high-grade narrowing or occlusion within the distal M2 branches of the right MCA anterior division at the upper margin of the  insula. 3. Multiple focal high-grade stenoses in both posterior cerebral arteries. There is weak segmental reconstitution of the distal left PCA branches.  This report was finalized on 9/11/2022 2:34 PM by Rom Javed MD.      CT Head Without Contrast Stroke Protocol    Result Date: 9/11/2022  Examination: CT HEAD WO CONTRAST STROKE PROTOCOL-  Date of Exam: 9/11/2022 1:22 PM  Indication: Neuro deficit, acute, stroke suspected.  Comparison: 01/02/2019.  Technique: Axial noncontrast CT imaging of the head was performed. Automated exposure control and iterative reconstruction methods were used.  Findings: Superficial soft tissues appear within normal limits. The calvarium is intact.  Paranasal sinuses and mastoid air cells appear well aerated. There is thinning of the orbital lenses bilaterally, unchanged and likely corresponding to cataract surgery. There is no acute intracranial hemorrhage.  No mass effect or midline shift.  No abnormal extra-axial collections.  Gray-white differentiation is within normal limits.  There is mild patchy periventricular white matter hypoattenuation. There is a new small triangular hypodensity in the right lentiform nucleus, which may represent a chronic lacunar infarct or prominent perivascular space. There is intracranial vascular calcification. Ventricular size and configuration is normal for age.       Impression:  1. No acute intracranial abnormality. 2. Chronic small vessel ischemic change. 3. New small triangular hypodensity in the right lentiform nucleus, likely chronic lacunar infarct versus prominent perivascular space.  This report was finalized on 9/11/2022 1:35 PM by Rom Javed MD.      CT Angiogram Head w AI Analysis of LVO    Result Date: 9/11/2022  Examination: CT ANGIOGRAM NECK-, CT ANGIOGRAM HEAD W AI ANALYSIS OF LVO-  Date of Exam: 9/11/2022 1:29 PM  Indication: Stroke, follow up.  Comparison: None available.  Technique: Axial volumetric contrast-enhanced CT  angiographic images of the head and neck were acquired utilizing 100 mL Isovue-370  IV contrast. 3-D reconstructions were created for interpretation. Automated exposure control and iterative reconstruction methods were used. AI analysis of LVO was utilized for the CTA Head imaging portion of the study.   Findings: Aortic arch: There is aneurysmal dilatation of the proximal descending thoracic aorta up to 33 mm. There is 3 vessel arch anatomy. The right brachiocephalic and bilateral subclavian arteries appear widely patent with minimal nonstenosing disease.  Right carotid: The CCA follows a normal course and appears normal caliber. There is minimal nonstenosing calcific plaque at the carotid bifurcation extending into the proximal ICA. ECA and distal branches appear patent. Cervical ICA is widely patent. There is mild nonstenosing plaque in the intracranial ICA segments. There is a large patent posterior communicating artery. The origin of the ophthalmic artery is normal. There is no clear definition of an anterior communicating artery. The M1 and A1 segments appear normal. Distal VAZQUEZ branches are patent. There is high-grade narrowing and occlusion of the distal right M2 anterior division. This occurs at the anterior superior aspect of the insula. The right MCA inferior and posterior divisions distal branches appear normal.  Left carotid: The left CCA follows a normal course and appears normal caliber. The left carotid bifurcation contains mild nonstenosing plaque. The ECA and distal branches are patent. Cervical ICA and intracranial ICA segments appear widely patent. There is a patent ophthalmic artery. No definite left P-comm. The left M1 and A1 segments and distal VAZQUEZ and MCA branches appear patent.  Posterior circulation: Vertebral arteries are codominant. The left vertebral artery appears tortuous. The vertebral arteries appear patent throughout the neck and into the head. The V4 segments, basilar artery,  superior cerebellar arteries and P1 segments appear patent. There is focal high-grade stenosis in the right P2 segment with similar finding at the right. 3/T4 junction. There is adequate distal reconstitution. There is moderate focal stenosis in the left P2 segment and a high-grade stenosis or occlusion in the left P3 segment with weak segmental distal reconstitution.. There is no evidence of acute intracranial hemorrhage. There is thinning of the orbital lenses bilaterally. There is no evidence of a neck mass or adenopathy. There is mild scarring in the lung apices. There are moderate cervical degenerative changes.      Impression:  1. No evidence of carotid artery or vertebral artery stenosis. 2. There are multiple areas of high-grade narrowing or occlusion within the distal M2 branches of the right MCA anterior division at the upper margin of the insula. 3. Multiple focal high-grade stenoses in both posterior cerebral arteries. There is weak segmental reconstitution of the distal left PCA branches.  This report was finalized on 9/11/2022 2:34 PM by Rom Javed MD.      CT CEREBRAL PERFUSION WITH & WITHOUT CONTRAST    Result Date: 9/11/2022  DATE OF EXAM: 9/11/2022 1:29 PM  PROCEDURE: CT CEREBRAL PERFUSION W WO CONTRAST-  INDICATIONS: Neuro deficit, acute, stroke suspected  COMPARISON: No comparisons available.  TECHNIQUE: Routine transaxial cuts were obtained through the head without administration of contrast. Routine transaxial cuts were then obtained through the head following the intravenous administration of 100 mL of Isovue 370. Core blood volume, core blood flow, mean transit time, and Tmax images were obtained utilizing the Rapid software protocol. A limited CT angiogram of the head was also performed to measure the blood vessel density.  The radiation dose reduction device was turned on for each scan per the ALARA (As Low as Reasonably Achievable) protocol.  FINDINGS: There is a small region of  cerebral blood flow less than 30% in the right central MCA distribution with a volume of 12 mL. There is a larger region of Tmax greater than 6 seconds measuring 39 mm also in the right central MCA distribution. Mismatch volume is 27 mL with a mismatch ratio is 3.3. These areas demonstrate decreased blood volume, decreased blood flow, increased mean transit time and increased Tmax.      Impression: There is evidence for 27 mL brain risk in the right MCA distribution with 12 mL central core infarct.  This report was finalized on 9/11/2022 2:18 PM by Rom Javed MD.            Assessment & Plan   Assessment & Plan     Active Hospital Problems    Diagnosis  POA   • Acute left-sided weakness [R53.1]  Yes   • Acute CVA (cerebrovascular accident) (HCC) [I63.9]  Yes   • HLD (hyperlipidemia) [E78.5]  Yes   • Essential hypertension [I10]  Yes   • Hypothyroidism (acquired) [E03.9]  Yes   • Mild depression [F32.A]  Yes     Nehal Parker is a 89 y.o. female with a past medical history of TIA, PE, HTN, HLD, Hypothyroidism and depression presented to the ED with complaints of left facial droop, left sided weakness and slurred speech.    Acute CVA  - CT Head w/o: New small triangular hypodensity in the right lentiform nucleus,  likely chronic lacunar infarct versus prominent perivascular space.   - CTA Head and Neck: There are multiple areas of high-grade narrowing or occlusion within the distal M2 branches of the right MCA anterior division at the upper  margin of the insula. Multiple focal high-grade stenoses in both posterior cerebral  arteries. There is weak segmental reconstitution of the distal left PCA branches.  - CT Cerebral perfusion: There is evidence for 27 mL brain risk in the right MCA distribution with 12 mL central core infarct.  - Stroke/neuro following  - rectal Aspirin 300mg x 1 in ED  - patient is a DNR   - not a candidate for TNKase due to unknown certain last known well.   - NPO until bedside swallow  eval per nursing  - MRI brain pending  - Patient was recently taken off Xarelto (taking it for a history of multiple PE) in the last 3 months due to more falls at home.  Patient will be admitted to the hospitalist office fo    AIS in the RMCA territory d/t occlusion of the distal M2 branches of the RMCA superior division  - athero vs cardioembolic    Hypertensive Emergency secondary to acute CVA  - stop Nicardipine- blood pressure drooped from 212 to 120  - hold home Amlodipine  - permissive HTN  - prn Hydralazine 10mg IV Q6H with goal of 180/100    HLD  - continue home statin once dosage obtained    Depression  - continue home med once dose obtained    Hypothyroidism  - resume home synthroid when dose verified  - recheck TSH in am    DVT prophylaxis:  SCDs    CODE STATUS:  DNR  Code Status and Medical Interventions:   Ordered at: 09/11/22 1514     Level Of Support Discussed With:    Patient     Code Status (Patient has no pulse and is not breathing):    No CPR (Do Not Attempt to Resuscitate)     Medical Interventions (Patient has pulse or is breathing):    Full Support         Ann Marie Robles DO  09/11/22

## 2022-09-12 ENCOUNTER — APPOINTMENT (OUTPATIENT)
Dept: CARDIOLOGY | Facility: HOSPITAL | Age: 87
End: 2022-09-12

## 2022-09-12 ENCOUNTER — APPOINTMENT (OUTPATIENT)
Dept: MRI IMAGING | Facility: HOSPITAL | Age: 87
End: 2022-09-12

## 2022-09-12 LAB
ALBUMIN SERPL-MCNC: 4.2 G/DL (ref 3.5–5.2)
ALBUMIN/GLOB SERPL: 1.5 G/DL
ALP SERPL-CCNC: 97 U/L (ref 39–117)
ALT SERPL W P-5'-P-CCNC: 10 U/L (ref 1–33)
ANION GAP SERPL CALCULATED.3IONS-SCNC: 12 MMOL/L (ref 5–15)
ASCENDING AORTA: 4.4 CM
AST SERPL-CCNC: 28 U/L (ref 1–32)
BH CV ECHO MEAS - AI P1/2T: 522.9 MSEC
BH CV ECHO MEAS - AO MAX PG: 5.8 MMHG
BH CV ECHO MEAS - AO MEAN PG: 3 MMHG
BH CV ECHO MEAS - AO ROOT DIAM: 3.4 CM
BH CV ECHO MEAS - AO V2 MAX: 120 CM/SEC
BH CV ECHO MEAS - AO V2 VTI: 28.6 CM
BH CV ECHO MEAS - AVA(I,D): 2.22 CM2
BH CV ECHO MEAS - EDV(CUBED): 79.5 ML
BH CV ECHO MEAS - EDV(MOD-SP2): 55.3 ML
BH CV ECHO MEAS - EDV(MOD-SP4): 52.5 ML
BH CV ECHO MEAS - EF(MOD-BP): 56.7 %
BH CV ECHO MEAS - EF(MOD-SP2): 57.9 %
BH CV ECHO MEAS - EF(MOD-SP4): 54.1 %
BH CV ECHO MEAS - ESV(CUBED): 27 ML
BH CV ECHO MEAS - ESV(MOD-SP2): 23.3 ML
BH CV ECHO MEAS - ESV(MOD-SP4): 24.1 ML
BH CV ECHO MEAS - FS: 30.2 %
BH CV ECHO MEAS - IVS/LVPW: 1.1 CM
BH CV ECHO MEAS - IVSD: 1.1 CM
BH CV ECHO MEAS - LA DIMENSION: 3 CM
BH CV ECHO MEAS - LAT PEAK E' VEL: 4.8 CM/SEC
BH CV ECHO MEAS - LV MASS(C)D: 152.6 GRAMS
BH CV ECHO MEAS - LV MAX PG: 2.8 MMHG
BH CV ECHO MEAS - LV MEAN PG: 1 MMHG
BH CV ECHO MEAS - LV V1 MAX: 83.3 CM/SEC
BH CV ECHO MEAS - LV V1 VTI: 20.2 CM
BH CV ECHO MEAS - LVIDD: 4.3 CM
BH CV ECHO MEAS - LVIDS: 3 CM
BH CV ECHO MEAS - LVOT AREA: 3.1 CM2
BH CV ECHO MEAS - LVOT DIAM: 2 CM
BH CV ECHO MEAS - LVPWD: 1 CM
BH CV ECHO MEAS - MED PEAK E' VEL: 4.2 CM/SEC
BH CV ECHO MEAS - MV A MAX VEL: 118 CM/SEC
BH CV ECHO MEAS - MV DEC SLOPE: 441 CM/SEC2
BH CV ECHO MEAS - MV DEC TIME: 0.31 MSEC
BH CV ECHO MEAS - MV E MAX VEL: 109 CM/SEC
BH CV ECHO MEAS - MV E/A: 0.92
BH CV ECHO MEAS - MV MAX PG: 7.3 MMHG
BH CV ECHO MEAS - MV MEAN PG: 3.5 MMHG
BH CV ECHO MEAS - MV P1/2T: 75.7 MSEC
BH CV ECHO MEAS - MV V2 VTI: 48 CM
BH CV ECHO MEAS - MVA(P1/2T): 2.9 CM2
BH CV ECHO MEAS - MVA(VTI): 1.32 CM2
BH CV ECHO MEAS - PA ACC TIME: 0.11 SEC
BH CV ECHO MEAS - PA PR(ACCEL): 31.3 MMHG
BH CV ECHO MEAS - SV(LVOT): 63.5 ML
BH CV ECHO MEAS - SV(MOD-SP2): 32 ML
BH CV ECHO MEAS - SV(MOD-SP4): 28.4 ML
BH CV ECHO MEAS - TAPSE (>1.6): 1.76 CM
BH CV ECHO MEASUREMENTS AVERAGE E/E' RATIO: 24.22
BH CV XLRA - RV BASE: 3.6 CM
BH CV XLRA - RV LENGTH: 7.2 CM
BH CV XLRA - RV MID: 2.9 CM
BH CV XLRA - TDI S': 11.3 CM/SEC
BILIRUB SERPL-MCNC: 0.5 MG/DL (ref 0–1.2)
BUN SERPL-MCNC: 12 MG/DL (ref 8–23)
BUN/CREAT SERPL: 21.4 (ref 7–25)
CALCIUM SPEC-SCNC: 10 MG/DL (ref 8.2–9.6)
CHLORIDE SERPL-SCNC: 99 MMOL/L (ref 98–107)
CHOLEST SERPL-MCNC: 205 MG/DL (ref 0–200)
CO2 SERPL-SCNC: 25 MMOL/L (ref 22–29)
CREAT SERPL-MCNC: 0.56 MG/DL (ref 0.57–1)
DEPRECATED RDW RBC AUTO: 44.4 FL (ref 37–54)
EGFRCR SERPLBLD CKD-EPI 2021: 83.6 ML/MIN/1.73
ERYTHROCYTE [DISTWIDTH] IN BLOOD BY AUTOMATED COUNT: 14 % (ref 12.3–15.4)
GLOBULIN UR ELPH-MCNC: 2.8 GM/DL
GLUCOSE BLDC GLUCOMTR-MCNC: 105 MG/DL (ref 70–130)
GLUCOSE SERPL-MCNC: 95 MG/DL (ref 65–99)
HBA1C MFR BLD: 5.9 % (ref 4.8–5.6)
HCT VFR BLD AUTO: 39.5 % (ref 34–46.6)
HDLC SERPL-MCNC: 60 MG/DL (ref 40–60)
HGB BLD-MCNC: 13 G/DL (ref 12–15.9)
LDLC SERPL CALC-MCNC: 127 MG/DL (ref 0–100)
LDLC/HDLC SERPL: 2.07 {RATIO}
LEFT ATRIUM VOLUME INDEX: 39.5 ML/M2
MAXIMAL PREDICTED HEART RATE: 124 BPM
MCH RBC QN AUTO: 28.4 PG (ref 26.6–33)
MCHC RBC AUTO-ENTMCNC: 32.9 G/DL (ref 31.5–35.7)
MCV RBC AUTO: 86.4 FL (ref 79–97)
PLATELET # BLD AUTO: 265 10*3/MM3 (ref 140–450)
PMV BLD AUTO: 9.8 FL (ref 6–12)
POTASSIUM SERPL-SCNC: 4.1 MMOL/L (ref 3.5–5.2)
PROT SERPL-MCNC: 7 G/DL (ref 6–8.5)
RBC # BLD AUTO: 4.57 10*6/MM3 (ref 3.77–5.28)
SODIUM SERPL-SCNC: 136 MMOL/L (ref 136–145)
STRESS TARGET HR: 105 BPM
TRIGL SERPL-MCNC: 103 MG/DL (ref 0–150)
TSH SERPL DL<=0.05 MIU/L-ACNC: 1.57 UIU/ML (ref 0.27–4.2)
VLDLC SERPL-MCNC: 18 MG/DL (ref 5–40)
WBC NRBC COR # BLD: 7.94 10*3/MM3 (ref 3.4–10.8)

## 2022-09-12 PROCEDURE — 84443 ASSAY THYROID STIM HORMONE: CPT | Performed by: HOSPITALIST

## 2022-09-12 PROCEDURE — 25010000002 HYDRALAZINE PER 20 MG: Performed by: HOSPITALIST

## 2022-09-12 PROCEDURE — 82962 GLUCOSE BLOOD TEST: CPT

## 2022-09-12 PROCEDURE — 99232 SBSQ HOSP IP/OBS MODERATE 35: CPT | Performed by: INTERNAL MEDICINE

## 2022-09-12 PROCEDURE — 92610 EVALUATE SWALLOWING FUNCTION: CPT

## 2022-09-12 PROCEDURE — 80061 LIPID PANEL: CPT | Performed by: NURSE PRACTITIONER

## 2022-09-12 PROCEDURE — 83036 HEMOGLOBIN GLYCOSYLATED A1C: CPT | Performed by: NURSE PRACTITIONER

## 2022-09-12 PROCEDURE — 85027 COMPLETE CBC AUTOMATED: CPT | Performed by: HOSPITALIST

## 2022-09-12 PROCEDURE — 80053 COMPREHEN METABOLIC PANEL: CPT | Performed by: HOSPITALIST

## 2022-09-12 PROCEDURE — 93306 TTE W/DOPPLER COMPLETE: CPT

## 2022-09-12 PROCEDURE — 70551 MRI BRAIN STEM W/O DYE: CPT

## 2022-09-12 PROCEDURE — 97166 OT EVAL MOD COMPLEX 45 MIN: CPT

## 2022-09-12 PROCEDURE — 99233 SBSQ HOSP IP/OBS HIGH 50: CPT | Performed by: STUDENT IN AN ORGANIZED HEALTH CARE EDUCATION/TRAINING PROGRAM

## 2022-09-12 PROCEDURE — 92523 SPEECH SOUND LANG COMPREHEN: CPT

## 2022-09-12 PROCEDURE — 97530 THERAPEUTIC ACTIVITIES: CPT

## 2022-09-12 RX ORDER — LEVOTHYROXINE SODIUM 112 UG/1
112 TABLET ORAL
Status: DISCONTINUED | OUTPATIENT
Start: 2022-09-12 | End: 2022-09-16 | Stop reason: HOSPADM

## 2022-09-12 RX ORDER — SERTRALINE HYDROCHLORIDE 25 MG/1
25 TABLET, FILM COATED ORAL DAILY
Status: DISCONTINUED | OUTPATIENT
Start: 2022-09-12 | End: 2022-09-16 | Stop reason: HOSPADM

## 2022-09-12 RX ORDER — ATORVASTATIN CALCIUM 40 MG/1
40 TABLET, FILM COATED ORAL NIGHTLY
Status: DISCONTINUED | OUTPATIENT
Start: 2022-09-12 | End: 2022-09-16 | Stop reason: HOSPADM

## 2022-09-12 RX ORDER — LOSARTAN POTASSIUM 25 MG/1
25 TABLET ORAL
Status: DISCONTINUED | OUTPATIENT
Start: 2022-09-12 | End: 2022-09-13

## 2022-09-12 RX ADMIN — LEVOTHYROXINE SODIUM 112 MCG: 0.11 TABLET ORAL at 10:41

## 2022-09-12 RX ADMIN — ASPIRIN 81 MG CHEWABLE TABLET 81 MG: 81 TABLET CHEWABLE at 10:41

## 2022-09-12 RX ADMIN — Medication 10 ML: at 10:42

## 2022-09-12 RX ADMIN — PANTOPRAZOLE SODIUM 40 MG: 40 TABLET, DELAYED RELEASE ORAL at 10:41

## 2022-09-12 RX ADMIN — Medication 10 ML: at 21:22

## 2022-09-12 RX ADMIN — ATORVASTATIN CALCIUM 40 MG: 40 TABLET, FILM COATED ORAL at 21:22

## 2022-09-12 RX ADMIN — SERTRALINE HYDROCHLORIDE 25 MG: 25 TABLET ORAL at 10:41

## 2022-09-12 RX ADMIN — HYDRALAZINE HYDROCHLORIDE 10 MG: 20 INJECTION INTRAMUSCULAR; INTRAVENOUS at 21:30

## 2022-09-12 RX ADMIN — LOSARTAN POTASSIUM 25 MG: 25 TABLET, FILM COATED ORAL at 17:54

## 2022-09-12 NOTE — PLAN OF CARE
Goal Outcome Evaluation:  Plan of Care Reviewed With: patient, daughter  Progress: improving  Outcome Evaluation: VSS. AOx4. NIH 1 for LUE drift. Patient has improvement in symptoms. Diet ordered per SLP. MRI and echo completed. OT eval ordered, awaiting for PT eval. Patient hypertensive; permissive hypertension allowed. Sinus rhythm on the monitor with first degree AV block.

## 2022-09-12 NOTE — PROGRESS NOTES
Stroke Progress Note       Chief Complaint: Left-sided weakness    Subjective    Subjective     Subjective:  Patient clinically doing well.  Review of Systems   Constitutional: No fatigue  HENT: Negative for nosebleeds and rhinorrhea.    Eyes: Negative for redness.   Respiratory: Negative for cough.    Gastrointestinal: Negative for anal bleeding.   Endocrine: Negative for polydipsia.   Genitourinary: Negative for enuresis and urgency.   Musculoskeletal: Negative for joint swelling.   Neurological: Negative for tremors.   Psychiatric/Behavioral: Negative for hallucinations.   Objective      Temp:  [97.4 °F (36.3 °C)-98.3 °F (36.8 °C)] 97.4 °F (36.3 °C)  Heart Rate:  [51-75] 51  Resp:  [16-18] 18  BP: (117-213)/() 170/77    Exam noted below  Results Review:    I reviewed the patient's new clinical results.    Lab Results (last 24 hours)     Procedure Component Value Units Date/Time    Hemoglobin A1c [[id number]]  (Abnormal) Collected: 09/12/22 0756    Specimen: Blood Updated: 09/12/22 0940     Hemoglobin A1C 5.90 %     Narrative:      Hemoglobin A1C Ranges:    Increased Risk for Diabetes  5.7% to 6.4%  Diabetes                     >= 6.5%  Diabetic Goal                < 7.0%    TSH [[id number]]  (Normal) Collected: 09/12/22 0756    Specimen: Blood Updated: 09/12/22 0840     TSH 1.570 uIU/mL     Comprehensive Metabolic Panel [[id number]]  (Abnormal) Collected: 09/12/22 0756    Specimen: Blood Updated: 09/12/22 0839     Glucose 95 mg/dL      BUN 12 mg/dL      Creatinine 0.56 mg/dL      Sodium 136 mmol/L      Potassium 4.1 mmol/L      Comment: Slight hemolysis detected by analyzer. Results may be affected.        Chloride 99 mmol/L      CO2 25.0 mmol/L      Calcium 10.0 mg/dL      Total Protein 7.0 g/dL      Albumin 4.20 g/dL      ALT (SGPT) 10 U/L      AST (SGOT) 28 U/L      Alkaline Phosphatase 97 U/L      Total Bilirubin 0.5 mg/dL      Globulin 2.8 gm/dL      Comment: Calculated Result        A/G Ratio 1.5 g/dL       BUN/Creatinine Ratio 21.4     Anion Gap 12.0 mmol/L      eGFR 83.6 mL/min/1.73      Comment: National Kidney Foundation and American Society of Nephrology (ASN) Task Force recommended calculation based on the Chronic Kidney Disease Epidemiology Collaboration (CKD-EPI) equation refit without adjustment for race.       Narrative:      GFR Normal >60  Chronic Kidney Disease <60  Kidney Failure <15      Lipid Panel [[id number]]  (Abnormal) Collected: 09/12/22 0756    Specimen: Blood Updated: 09/12/22 0839     Total Cholesterol 205 mg/dL      Triglycerides 103 mg/dL      HDL Cholesterol 60 mg/dL      LDL Cholesterol  127 mg/dL      VLDL Cholesterol 18 mg/dL      LDL/HDL Ratio 2.07    Narrative:      Cholesterol Reference Ranges  (U.S. Department of Health and Human Services ATP III Classifications)    Desirable          <200 mg/dL  Borderline High    200-239 mg/dL  High Risk          >240 mg/dL      Triglyceride Reference Ranges  (U.S. Department of Health and Human Services ATP III Classifications)    Normal           <150 mg/dL  Borderline High  150-199 mg/dL  High             200-499 mg/dL  Very High        >500 mg/dL    HDL Reference Ranges  (U.S. Department of Health and Human Services ATP III Classifications)    Low     <40 mg/dl (major risk factor for CHD)  High    >60 mg/dl ('negative' risk factor for CHD)        LDL Reference Ranges  (U.S. Department of Health and Human Services ATP III Classifications)    Optimal          <100 mg/dL  Near Optimal     100-129 mg/dL  Borderline High  130-159 mg/dL  High             160-189 mg/dL  Very High        >189 mg/dL    CBC (No Diff) [[id number]]  (Normal) Collected: 09/12/22 0756    Specimen: Blood Updated: 09/12/22 0820     WBC 7.94 10*3/mm3      RBC 4.57 10*6/mm3      Hemoglobin 13.0 g/dL      Hematocrit 39.5 %      MCV 86.4 fL      MCH 28.4 pg      MCHC 32.9 g/dL      RDW 14.0 %      RDW-SD 44.4 fl      MPV 9.8 fL      Platelets 265 10*3/mm3     POC Glucose Once  [[id number]]  (Normal) Collected: 09/12/22 0019    Specimen: Blood Updated: 09/12/22 0021     Glucose 105 mg/dL      Comment: Meter: [id number] : [id number] Darius Perez       [place] Draw [[id number]] Collected: 09/11/22 1349    Specimen: Blood Updated: 09/11/22 1804    Narrative:      The following orders were created for panel order [place] Draw.  Procedure                               Abnormality         Status                     ---------                               -----------         ------                     Green Top (Gel)[[id number]]                                  Final result               Lavender Top[[id number]]                                     Final result               Gold Top - SST[[id number]]                                   Final result               Gray Top[[id number]]                                         Final result               Light Blue Top[[id number]]                                   Final result                 Please view results for these tests on the individual orders.    Gray Top [[id number]] Collected: 09/11/22 1349    Specimen: Blood Updated: 09/11/22 1804     Extra Tube Hold for add-ons.     Comment: Auto resulted.       POC Glucose Once [[id number]]  (Normal) Collected: 09/11/22 1716    Specimen: Blood Updated: 09/11/22 1718     Glucose 104 mg/dL      Comment: Meter: [id number] : [id number] Rosemary Gissel       Green Top (Gel) [[id number]] Collected: 09/11/22 1349    Specimen: Blood Updated: 09/11/22 1503     Extra Tube Hold for add-ons.     Comment: Auto resulted.       Lavender Top [[id number]] Collected: 09/11/22 1349    Specimen: Blood Updated: 09/11/22 1503     Extra Tube hold for add-on     Comment: Auto resulted       Gold Top - SST [[id number]] Collected: 09/11/22 1349    Specimen: Blood Updated: 09/11/22 1503     Extra Tube Hold for add-ons.     Comment: Auto resulted.       Light Blue Top [[id number]] Collected: 09/11/22 1349    Specimen: Blood Updated:  09/11/22 1503     Extra Tube Hold for add-ons.     Comment: Auto resulted       AST [[id number]]  (Normal) Collected: 09/11/22 1349    Specimen: Blood Updated: 09/11/22 1437     AST (SGOT) 23 U/L     Urinalysis With Microscopic If Indicated (No Culture) - Urine, Catheter [[id number]]  (Abnormal) Collected: 09/11/22 1418    Specimen: Urine, Catheter Updated: 09/11/22 1436     Color, UA Yellow     Appearance, UA Clear     pH, UA 6.5     Specific Gravity, UA 1.058     Glucose, UA Negative     Ketones, UA Negative     Bilirubin, UA Negative     Blood, UA Negative     Protein, UA Negative     Leuk Esterase, UA Trace     Nitrite, UA Negative     Urobilinogen, UA 0.2 E.U./dL    Urinalysis, Microscopic Only - Urine, Catheter [[id number]]  (Abnormal) Collected: 09/11/22 1418    Specimen: Urine, Catheter Updated: 09/11/22 1436     RBC, UA 0-2 /HPF      WBC, UA 3-5 /HPF      Bacteria, UA None Seen /HPF      Squamous Epithelial Cells, UA 3-6 /HPF      Hyaline Casts, UA 0-6 /LPF      Methodology Automated Microscopy    ALT [[id number]]  (Normal) Collected: 09/11/22 1349    Specimen: Blood Updated: 09/11/22 1423     ALT (SGPT) 11 U/L     aPTT [[id number]]  (Normal) Collected: 09/11/22 1349    Specimen: Blood Updated: 09/11/22 1423     PTT 31.4 seconds     Narrative:      PTT = The equivalent PTT values for the therapeutic range of heparin levels at 0.3 to 0.5 U/ml are 60 to 70 seconds.    Troponin [[id number]]  (Normal) Collected: 09/11/22 1349    Specimen: Blood Updated: 09/11/22 1421     Troponin T <0.010 ng/mL     Narrative:      Troponin T Reference Range:  <= 0.03 ng/mL-   Negative for AMI  >0.03 ng/mL-     Abnormal for myocardial necrosis.  Clinicians would have to utilize clinical acumen, EKG, Troponin and serial changes to determine if it is an Acute Myocardial Infarction or myocardial injury due to an underlying chronic condition.       Results may be falsely decreased if patient taking Biotin.      CBC & Differential  [[id number]]  (Abnormal) Collected: 09/11/22 1349    Specimen: Blood Updated: 09/11/22 1400    Narrative:      The following orders were created for panel order CBC & Differential.  Procedure                               Abnormality         Status                     ---------                               -----------         ------                     CBC Auto Differential[[id number]]        Abnormal            Final result                 Please view results for these tests on the individual orders.    CBC Auto Differential [[id number]]  (Abnormal) Collected: 09/11/22 1349    Specimen: Blood Updated: 09/11/22 1400     WBC 8.33 10*3/mm3      RBC 4.62 10*6/mm3      Hemoglobin 13.2 g/dL      Hematocrit 40.4 %      MCV 87.4 fL      MCH 28.6 pg      MCHC 32.7 g/dL      RDW 14.0 %      RDW-SD 45.1 fl      MPV 9.8 fL      Platelets 282 10*3/mm3      Neutrophil % 77.6 %      Lymphocyte % 14.4 %      Monocyte % 5.3 %      Eosinophil % 1.8 %      Basophil % 0.7 %      Immature Grans % 0.2 %      Neutrophils, Absolute 6.46 10*3/mm3      Lymphocytes, Absolute 1.20 10*3/mm3      Monocytes, Absolute 0.44 10*3/mm3      Eosinophils, Absolute 0.15 10*3/mm3      Basophils, Absolute 0.06 10*3/mm3      Immature Grans, Absolute 0.02 10*3/mm3      nRBC 0.0 /100 WBC     POC Protime / INR [[id number]]  (Abnormal) Collected: 09/11/22 1344    Specimen: Blood Updated: 09/11/22 1348     Protime 15.0 seconds      INR 1.3     Comment: Serial Number: [id number]:  [id number]       POC CHEM 8 [[id number]]  (Abnormal) Collected: 09/11/22 1344    Specimen: Blood Updated: 09/11/22 1348     Glucose 157 mg/dL      BUN 19 mg/dL      Creatinine 0.60 mg/dL      Sodium 140 mmol/L      POC Potassium 4.0 mmol/L      Chloride 101 mmol/L      Total CO2 28 mmol/L      Hemoglobin 14.3 g/dL      Comment: Serial Number: [id number]:  [id number]        Hematocrit 42 %      Ionized Calcium 1.33 mmol/L      eGFR 82.3 mL/min/1.73         CT Angiogram Neck    Result Date:  9/11/2022   1. No evidence of carotid artery or vertebral artery stenosis. 2. There are multiple areas of high-grade narrowing or occlusion within the distal M2 branches of the right MCA anterior division at the upper margin of the insula. 3. Multiple focal high-grade stenoses in both posterior cerebral arteries. There is weak segmental reconstitution of the distal left PCA branches.  This report was finalized on 9/11/2022 2:34 PM by Rom Javed MD.      XR Chest 1 View    Result Date: 9/11/2022  Probable cardiomegaly with chronic changes in the lungs. No definite acute disease.  This report was finalized on 9/11/2022 3:59 PM by Rom Javed MD.      CT Head Without Contrast Stroke Protocol    Result Date: 9/11/2022   1. No acute intracranial abnormality. 2. Chronic small vessel ischemic change. 3. New small triangular hypodensity in the right lentiform nucleus, likely chronic lacunar infarct versus prominent perivascular space.  This report was finalized on 9/11/2022 1:35 PM by Rom Javed MD.      CT Angiogram Head w AI Analysis of LVO    Result Date: 9/11/2022   1. No evidence of carotid artery or vertebral artery stenosis. 2. There are multiple areas of high-grade narrowing or occlusion within the distal M2 branches of the right MCA anterior division at the upper margin of the insula. 3. Multiple focal high-grade stenoses in both posterior cerebral arteries. There is weak segmental reconstitution of the distal left PCA branches.  This report was finalized on 9/11/2022 2:34 PM by Rom Javed MD.      CT CEREBRAL PERFUSION WITH & WITHOUT CONTRAST    Result Date: 9/11/2022  There is evidence for 27 mL brain risk in the right MCA distribution with 12 mL central core infarct.  This report was finalized on 9/11/2022 2:18 PM by Rom Javed MD.                Assessment/Plan     Assessment/Plan:  89-year-old lady with significant vascular risk factors, including A. fib, has been having multiple falls, off  of anticoagulation presented with altered mental status and speech difficulty and left sided weakness.  Acute stroke imaging included CT head, CTA head and neck which showed possible right distal superior MCA occlusion with perfusion deficit.  Given her age and other comorbidities with baseline modified [place] score of 3, the patient was deemed not a candidate for endovascular invention.  Plan for medical management.       On exam, patient does not have any major deficits except for dysarthria, left upper extremity drift, left lower extremity drift.     Acute ischemic stroke, right MCA, likely etiology cardio-embolism  -Pending MRI brain, based on the infarct size, benefit versus risk  need to be weighed regarding initiation of anticoagulation after discussing with the family.  -Okay for aspirin 81 at this time.  -PT OT speech continue to work with the patient.    Essential hypertension-normal blood pressure goals    Atrial fibrillation-not on AC for now, defer due to acute stroke at this time.    Hyperlipidemia, LDL, 127-on high-intensive statin      Neurology continue to follow the patient.          Stephen Pendleton MD  09/12/22  10:46 EDT

## 2022-09-12 NOTE — PLAN OF CARE
Goal Outcome Evaluation:  Plan of Care Reviewed With: patient        Progress: no change (OT IE)  Outcome Evaluation: OT evaluation completed. Pt presents with decreased I in ADLs, related t/fs, mobility compared to PLOF limited by decreased fxl endurance, impaired balance, muscle weakness at BUEs, decreased distal reach toward LB impacting LB ADLs and postural, anterior and posterior lean in standing. Pt completed supine > sitting with mod A, scooting to EOB with min A, STS at EOB and multi directional stepping with FWW with min A x 2. Skilled cues for weight shifting to improve balance in standing. Pt completed d/d gown with min A, doffed sock with SBA, req'd dep care to re don d/t balance concerns with distal reach and SUA for face/hand washing. Pt with mild motor drift and more weakness at L UE, largely L shoulder thus educated on optimal seq for threading and unthreading garments for dressing. Pt has recent fall hx. Recommend IPOT POC and IRF based on underlying impairments and impact on function and said fall hx all pending progress.

## 2022-09-12 NOTE — CASE MANAGEMENT/SOCIAL WORK
Discharge Planning Assessment  [facility]     Patient Name: Nehal Parker  MRN: [id number]  Today's Date: 9/12/2022    Admit Date: 9/11/2022     Discharge Needs Assessment     Row Name 09/12/22 1127       Living Environment    People in Home alone    Current Living Arrangements assisted living facility    Primary Care Provided by self       Transition Planning    Patient/Family Anticipates Transition to home with help/services       Discharge Needs Assessment    Readmission Within the Last 30 Days no previous admission in last 30 days    Current Outpatient/Agency/Support Group assisted living facility    Equipment Currently Used at Home walker, rolling;shower chair    Concerns to be Addressed basic needs;discharge planning    Outpatient/Agency/Support Group Needs assisted living facility    Discharge Facility/Level of Care Needs assisted living facility               Discharge Plan     Row Name 09/12/22 1130       Plan    Plan home    Patient/Family in Agreement with Plan yes    Plan Comments I met with Ms. Parker at the bedside. She lives in assisted living facility at [facility] in [facility]. She ambulates independently with a rolling walker and is independent with activities of daily living except for showering. Her daughters tranport her by car when she leaves her home. She administers medications independently at baseline. She anticiapates returning to the assisted living facility at discharge. She was admitted for evaluation of a stroke. PT/OT have been consulted. Case management will follow-up with maria a post-discharge recommendations.    Final Discharge Disposition Code 01 - home or self-care              Continued Care and Services - Admitted Since 9/11/2022    Coordination has not been started for this encounter.       Expected Discharge Date and Time     Expected Discharge Date Expected Discharge Time    Sep 13, 2022          Demographic Summary     Row Name 09/12/22 1126       General  Information    General Information Comments I confirmed that Ms. Parker's PCP is Brenton Clay and her primary insurance is Medicare and her secondary insurance is [facility].               Functional Status     Row Name 09/12/22 1127       Functional Status, IADL    Medications independent    Meal Preparation assistive person    Housekeeping assistive person    Laundry assistive person    Shopping assistive person               Psychosocial    No documentation.                Abuse/Neglect    No documentation.                Legal    No documentation.                Substance Abuse    No documentation.                Patient Forms    No documentation.                   Brynn Huizar RN

## 2022-09-12 NOTE — PLAN OF CARE
Goal Outcome Evaluation:  Plan of Care Reviewed With: patient            SLP evaluation completed. Will  f/u for speech tx & assessment of diet tolerance . Please see note for further details and recommendations.

## 2022-09-12 NOTE — PROGRESS NOTES
[facility]  PROGRESS NOTE    Patient Name: Nehal Parker  : 2/15/1926  MRN: [id number]    Date of Admission: 2022  Primary Care Physician: Brenton Clay MD    Subjective   Subjective     CC:  Follow-up for acute stroke    HPI:  I have seen and evaluated the patient this morning.  Comfortable in bed.  Weakness on the left side much improved.  Dysarthria also improved.  Patient reported that she is feeling hungry and requesting something to eat.  No acute events overnight.  Blood pressure remains on the hypertensive side    ROS:  General : no fevers, chills  CVS: No chest pain, palpitations  Respiratory: No cough, dyspnea  GI: No N/V/D, abd pain  10 point review of systems is negative except for what is mentioned in HPI    Objective   Objective     Vital Signs:   Temp:  [97.4 °F (36.3 °C)-98.3 °F (36.8 °C)] 98.3 °F (36.8 °C)  Heart Rate:  [51-75] 65  Resp:  [16-18] 16  BP: (117-192)/(77-99) 182/91     Physical Exam:  General: Comfortable, not in distress, conversant and cooperative  Head: Atraumatic and normocephalic  Eyes: No Icterus. No pallor  Ears:  Ears appear intact with no abnormalities noted  Throat: No oral lesions, no thrush  Neck: Supple, trachea midline  Lungs: Clear to auscultation bilaterally, equal air entry, no wheezing or crackles  Heart:  Normal S1 and S2, no murmur, no gallop, No JVD, no lower extremity swelling  Abdomen:  Soft, no tenderness, no organomegaly, normal bowel sounds, no organomegaly  Extremities: pulses equal bilaterally  Skin: No bleeding, bruising or rash, normal skin turgor and elasticity  Neurologic: Cranial nerves appear intact with no evidence of facial asymmetry, mild left-sided weakness, unable to assess sensation properly   Psych: Alert and oriented x 3, normal mood    Results Reviewed:  LAB RESULTS:      Lab 22  0756 22  1349 22  1344   WBC 7.94 8.33  --    HEMOGLOBIN 13.0 13.2  --    HEMOGLOBIN,  POC  --   --  14.3   HEMATOCRIT 39.5 40.4  --    HEMATOCRIT POC  --   --  42   PLATELETS 265 282  --    NEUTROS ABS  --  6.46  --    IMMATURE GRANS (ABS)  --  0.02  --    LYMPHS ABS  --  1.20  --    MONOS ABS  --  0.44  --    EOS ABS  --  0.15  --    MCV 86.4 87.4  --    PROTIME  --   --  15.0   APTT  --  31.4  --          Lab 09/12/22  0756 09/11/22  1344   SODIUM 136  --    POTASSIUM 4.1  --    CHLORIDE 99  --    CO2 25.0  --    ANION GAP 12.0  --    BUN 12  --    CREATININE 0.56* 0.60   EGFR 83.6 82.3   GLUCOSE 95  --    CALCIUM 10.0*  --    HEMOGLOBIN A1C 5.90*  --    TSH 1.570  --          Lab 09/12/22  0756 09/11/22  1349   TOTAL PROTEIN 7.0  --    ALBUMIN 4.20  --    GLOBULIN 2.8  --    ALT (SGPT) 10 11   AST (SGOT) 28 23   BILIRUBIN 0.5  --    ALK PHOS 97  --          Lab 09/11/22  1349 09/11/22  1344   TROPONIN T <0.010  --    PROTIME  --  15.0   INR  --  1.3*         Lab 09/12/22  0756   CHOLESTEROL 205*   LDL CHOL 127*   HDL CHOL 60   TRIGLYCERIDES 103             Brief Urine Lab Results  (Last result in the past 365 days)      Color   Clarity   Blood   Leuk Est   Nitrite   Protein   CREAT   Urine HCG        09/11/22 1418 Yellow   Clear   Negative   Trace   Negative   Negative                 Microbiology Results Abnormal     None          Adult Transthoracic Echo Complete W/ Cont if Necessary Per Protocol (With Agitated Saline)    Result Date: 9/12/2022  · Left ventricular wall thickness is consistent with mild concentric hypertrophy. · Left ventricular ejection fraction appears to be 56 - 60%. · Left atrial volume is mildly increased. · There is calcification of the aortic valve. · Mild dilation of the aortic root is present. Mild dilation of the sinuses of Valsalva is present. Mild dilation of the ascending aorta is present. · MAC and calcification of subvalvular structures but no significant MS · Mild MR      CT Angiogram Neck    Result Date: 9/11/2022  Examination: CT ANGIOGRAM NECK-, CT ANGIOGRAM  HEAD W AI ANALYSIS OF LVO-  Date of Exam: 9/11/2022 1:29 PM  Indication: Stroke, follow up.  Comparison: None available.  Technique: Axial volumetric contrast-enhanced CT angiographic images of the head and neck were acquired utilizing 100 mL Isovue-370  IV contrast. 3-D reconstructions were created for interpretation. Automated exposure control and iterative reconstruction methods were used. AI analysis of LVO was utilized for the CTA Head imaging portion of the study.   Findings: Aortic arch: There is aneurysmal dilatation of the proximal descending thoracic aorta up to 33 mm. There is 3 vessel arch anatomy. The right brachiocephalic and bilateral subclavian arteries appear widely patent with minimal nonstenosing disease.  Right carotid: The CCA follows a normal course and appears normal caliber. There is minimal nonstenosing calcific plaque at the carotid bifurcation extending into the proximal ICA. ECA and distal branches appear patent. Cervical ICA is widely patent. There is mild nonstenosing plaque in the intracranial ICA segments. There is a large patent posterior communicating artery. The origin of the ophthalmic artery is normal. There is no clear definition of an anterior communicating artery. The M1 and A1 segments appear normal. Distal VAZQUEZ branches are patent. There is high-grade narrowing and occlusion of the distal right M2 anterior division. This occurs at the anterior superior aspect of the insula. The right MCA inferior and posterior divisions distal branches appear normal.  Left carotid: The left CCA follows a normal course and appears normal caliber. The left carotid bifurcation contains mild nonstenosing plaque. The ECA and distal branches are patent. Cervical ICA and intracranial ICA segments appear widely patent. There is a patent ophthalmic artery. No definite left P-comm. The left M1 and A1 segments and distal VAZQUEZ and MCA branches appear patent.  Posterior circulation: Vertebral arteries are  codominant. The left vertebral artery appears tortuous. The vertebral arteries appear patent throughout the neck and into the head. The V4 segments, basilar artery, superior cerebellar arteries and P1 segments appear patent. There is focal high-grade stenosis in the right P2 segment with similar finding at the right. 3/T4 junction. There is adequate distal reconstitution. There is moderate focal stenosis in the left P2 segment and a high-grade stenosis or occlusion in the left P3 segment with weak segmental distal reconstitution.. There is no evidence of acute intracranial hemorrhage. There is thinning of the orbital lenses bilaterally. There is no evidence of a neck mass or adenopathy. There is mild scarring in the lung apices. There are moderate cervical degenerative changes.      Impression:  1. No evidence of carotid artery or vertebral artery stenosis. 2. There are multiple areas of high-grade narrowing or occlusion within the distal M2 branches of the right MCA anterior division at the upper margin of the insula. 3. Multiple focal high-grade stenoses in both posterior cerebral arteries. There is weak segmental reconstitution of the distal left PCA branches.  This report was finalized on 9/11/2022 2:34 PM by Rom Javed MD.      MRI Brain Without Contrast    Result Date: 9/12/2022  DATE OF EXAM: 9/12/2022 1:00 PM  PROCEDURE: MRI BRAIN WO CONTRAST-  INDICATIONS: Stroke, follow up; I63.511-Cerebral infarction due to unspecified occlusion or stenosis of right middle cerebral artery; I10-Essential (primary) hypertension; Z66-Do not resuscitate  COMPARISON: Same-day CT cerebral perfusion, CTA head and neck, CT head  TECHNIQUE: Multiplanar multisequence images of the brain were performed without contrast according to routine brain MRI protocol.  FINDINGS: There is an acute infarct in the right frontotemporal lobe with involvement of the insular cortex (series 3 image 57-63). There is corresponding T2/FLAIR  hyperintense signal change within this region, with mild cerebral swelling. No acute intracranial hemorrhage/hemorrhagic transformation. There are a few punctate foci of GRE susceptibility within the region of infarct likely reflecting microhemorrhage/early cortical laminar necrosis. There is no midline shift or herniation. Elsewhere there are scattered subcortical and periventricular white matter FLAIR/T2 hyperintensities which are nonspecific and can be seen in the setting of chronic small vessel ischemic change. There is mild global cerebral volume loss. No extra-axial collections. Normal size and configuration of the ventricles. The cerebellopontine angles and midline structures are normal. The major intracranial vascular flow voids are preserved and appear grossly unremarkable. The paranasal sinuses and mastoid air cells are clear. No acute or suspicious bony findings. Normal appearance of the orbits.      Impression: Acute infarct in the right frontotemporal region as above.  Findings discussed with stroke navigator by Dr. Cedrick Haq via telephone at 3:10 PM 9/12/2022.  This report was finalized on 9/12/2022 3:11 PM by Cedrick Haq MD.      XR Chest 1 View    Result Date: 9/11/2022  Examination: XR CHEST 1 VW-  Date of Exam: 9/11/2022 3:28 PM  Indication: Acute Stroke Protocol (onset < 12 hrs); I63.511-Cerebral infarction due to unspecified occlusion or stenosis of right middle cerebral artery; I10-Essential (primary) hypertension; Z66-Do not resuscitate.  Comparison: Correlation with CTA neck performed earlier today.  Technique: Single radiographic view of the chest was obtained.  Findings: There is no pneumothorax, pleural effusion or focal airspace consolidation.. There is left basilar opacity, likely atelectasis. There is diffuse interstitial prominence in the lungs. Cardiac silhouette appears enlarged. Pulmonary vasculature appears within normal limits. There is an old healed proximal left humerus  fracture. There is DJD of both shoulders. There are moderate spinal degenerative changes.      Impression: Probable cardiomegaly with chronic changes in the lungs. No definite acute disease.  This report was finalized on 9/11/2022 3:59 PM by Rom Javed MD.      CT Head Without Contrast Stroke Protocol    Result Date: 9/11/2022  Examination: CT HEAD WO CONTRAST STROKE PROTOCOL-  Date of Exam: 9/11/2022 1:22 PM  Indication: Neuro deficit, acute, stroke suspected.  Comparison: 01/02/2019.  Technique: Axial noncontrast CT imaging of the head was performed. Automated exposure control and iterative reconstruction methods were used.  Findings: Superficial soft tissues appear within normal limits. The calvarium is intact.  Paranasal sinuses and mastoid air cells appear well aerated. There is thinning of the orbital lenses bilaterally, unchanged and likely corresponding to cataract surgery. There is no acute intracranial hemorrhage.  No mass effect or midline shift.  No abnormal extra-axial collections.  Gray-white differentiation is within normal limits.  There is mild patchy periventricular white matter hypoattenuation. There is a new small triangular hypodensity in the right lentiform nucleus, which may represent a chronic lacunar infarct or prominent perivascular space. There is intracranial vascular calcification. Ventricular size and configuration is normal for age.       Impression:  1. No acute intracranial abnormality. 2. Chronic small vessel ischemic change. 3. New small triangular hypodensity in the right lentiform nucleus, likely chronic lacunar infarct versus prominent perivascular space.  This report was finalized on 9/11/2022 1:35 PM by Rom Javed MD.      CT Angiogram Head w AI Analysis of LVO    Result Date: 9/11/2022  Examination: CT ANGIOGRAM NECK-, CT ANGIOGRAM HEAD W AI ANALYSIS OF LVO-  Date of Exam: 9/11/2022 1:29 PM  Indication: Stroke, follow up.  Comparison: None available.  Technique: Axial  volumetric contrast-enhanced CT angiographic images of the head and neck were acquired utilizing 100 mL Isovue-370  IV contrast. 3-D reconstructions were created for interpretation. Automated exposure control and iterative reconstruction methods were used. AI analysis of LVO was utilized for the CTA Head imaging portion of the study.   Findings: Aortic arch: There is aneurysmal dilatation of the proximal descending thoracic aorta up to 33 mm. There is 3 vessel arch anatomy. The right brachiocephalic and bilateral subclavian arteries appear widely patent with minimal nonstenosing disease.  Right carotid: The CCA follows a normal course and appears normal caliber. There is minimal nonstenosing calcific plaque at the carotid bifurcation extending into the proximal ICA. ECA and distal branches appear patent. Cervical ICA is widely patent. There is mild nonstenosing plaque in the intracranial ICA segments. There is a large patent posterior communicating artery. The origin of the ophthalmic artery is normal. There is no clear definition of an anterior communicating artery. The M1 and A1 segments appear normal. Distal VAZQUEZ branches are patent. There is high-grade narrowing and occlusion of the distal right M2 anterior division. This occurs at the anterior superior aspect of the insula. The right MCA inferior and posterior divisions distal branches appear normal.  Left carotid: The left CCA follows a normal course and appears normal caliber. The left carotid bifurcation contains mild nonstenosing plaque. The ECA and distal branches are patent. Cervical ICA and intracranial ICA segments appear widely patent. There is a patent ophthalmic artery. No definite left P-comm. The left M1 and A1 segments and distal VAZQUEZ and MCA branches appear patent.  Posterior circulation: Vertebral arteries are codominant. The left vertebral artery appears tortuous. The vertebral arteries appear patent throughout the neck and into the head. The V4  segments, basilar artery, superior cerebellar arteries and P1 segments appear patent. There is focal high-grade stenosis in the right P2 segment with similar finding at the right. 3/T4 junction. There is adequate distal reconstitution. There is moderate focal stenosis in the left P2 segment and a high-grade stenosis or occlusion in the left P3 segment with weak segmental distal reconstitution.. There is no evidence of acute intracranial hemorrhage. There is thinning of the orbital lenses bilaterally. There is no evidence of a neck mass or adenopathy. There is mild scarring in the lung apices. There are moderate cervical degenerative changes.      Impression:  1. No evidence of carotid artery or vertebral artery stenosis. 2. There are multiple areas of high-grade narrowing or occlusion within the distal M2 branches of the right MCA anterior division at the upper margin of the insula. 3. Multiple focal high-grade stenoses in both posterior cerebral arteries. There is weak segmental reconstitution of the distal left PCA branches.  This report was finalized on 9/11/2022 2:34 PM by Rom Javed MD.      CT CEREBRAL PERFUSION WITH & WITHOUT CONTRAST    Result Date: 9/11/2022  DATE OF EXAM: 9/11/2022 1:29 PM  PROCEDURE: CT CEREBRAL PERFUSION W WO CONTRAST-  INDICATIONS: Neuro deficit, acute, stroke suspected  COMPARISON: No comparisons available.  TECHNIQUE: Routine transaxial cuts were obtained through the head without administration of contrast. Routine transaxial cuts were then obtained through the head following the intravenous administration of 100 mL of Isovue 370. Core blood volume, core blood flow, mean transit time, and Tmax images were obtained utilizing the Rapid software protocol. A limited CT angiogram of the head was also performed to measure the blood vessel density.  The radiation dose reduction device was turned on for each scan per the ALARA (As Low as Reasonably Achievable) protocol.  FINDINGS: There  is a small region of cerebral blood flow less than 30% in the right central MCA distribution with a volume of 12 mL. There is a larger region of Tmax greater than 6 seconds measuring 39 mm also in the right central MCA distribution. Mismatch volume is 27 mL with a mismatch ratio is 3.3. These areas demonstrate decreased blood volume, decreased blood flow, increased mean transit time and increased Tmax.      Impression: There is evidence for 27 mL brain risk in the right MCA distribution with 12 mL central core infarct.  This report was finalized on 9/11/2022 2:18 PM by Rom Javed MD.        Results for orders placed during the hospital encounter of 09/11/22    Adult Transthoracic Echo Complete W/ Cont if Necessary Per Protocol (With Agitated Saline)    Interpretation Summary  · Left ventricular wall thickness is consistent with mild concentric hypertrophy.  · Left ventricular ejection fraction appears to be 56 - 60%.  · Left atrial volume is mildly increased.  · There is calcification of the aortic valve.  · Mild dilation of the aortic root is present. Mild dilation of the sinuses of Valsalva is present. Mild dilation of the ascending aorta is present.  · MAC and calcification of subvalvular structures but no significant MS  · Mild MR      I have reviewed the medications:  Scheduled Meds:aspirin, 81 mg, Oral, Daily   Or  aspirin, 300 mg, Rectal, Daily  atorvastatin, 80 mg, Oral, Nightly  levothyroxine, 112 mcg, Oral, Q AM  pantoprazole, 40 mg, Oral, QAM  sertraline, 25 mg, Oral, Daily  sodium chloride, 10 mL, Intravenous, Q12H  sodium chloride, 10 mL, Intravenous, Q12H      Continuous Infusions:niCARdipine, 5-15 mg/hr, Last Rate: Stopped (09/12/22 0715)      PRN Meds:.•  acetaminophen **OR** acetaminophen **OR** acetaminophen  •  hydrALAZINE  •  ondansetron **OR** ondansetron  •  sodium chloride  •  sodium chloride  •  sodium chloride    Assessment & Plan   Assessment & Plan     Active Hospital Problems     Diagnosis  POA   • Acute left-sided weakness [R53.1]  Yes   • Acute CVA (cerebrovascular accident) (HCC) [I63.9]  Yes   • HLD (hyperlipidemia) [E78.5]  Yes   • Essential hypertension [I10]  Yes   • Hypothyroidism (acquired) [E03.9]  Yes   • Mild depression [F32.A]  Yes      Resolved Hospital Problems   No resolved problems to display.        Brief Hospital Course to date:  Nehal Parker is a 89 y.o. female with past medical history of essential hypertension, dyslipidemia, hypothyroidism, depression, TIA, history of PE who was recently taken off Xarelto (that she was taking for recurrent PE) who presented to the hospital with left-sided weakness, left facial droop and slurred speech.  She was found to have acute stroke but was not candidate for tPA (outside the window) no revascularization because of her age    Assessment and plan:  Acute right MCA ischemic stroke  Hypertensive emergency  · Was not candidate for tPA (outside the window)  · Not candidate for intervention/revascularization (age)  · Symptoms much improved (improved slurred speech, resolved left facial droop and improved left-sided weakness)  · CT head and neck  · CTA Head and Neck:  multiple areas of high-grade narrowing or occlusion within the distal M2 branches of the right MCA. Multiple focal high-grade stenoses in both posterior cerebral arteries  · CT Cerebral perfusion: 27 mL brain risk in the right MCA distribution with 12 mL central core infarct  · MRI brain confirmed acute right frontotemporal region infarct  · Continue aspirin, high intensity statins  · Start low-dose losartan 25 mg and allow some degree of permissive hypertension  · Stroke team/neurology following.  Appreciate the help  · PT/ OT evaluation  · Speech eval    Hypothyroidism  · Levothyroxine    Dyslipidemia  ·   · Decrease Lipitor to 40 mg daily given her age    Depression  · Zoloft    History of recurrent PE  · Recently taken off Xarelto (unclear reason)  · Hold off  any anticoagulation given the size of her acute stroke    Expected Discharge Location and Transportation: Home versus acute rehab  Expected Discharge Date: 9/14/2022    DVT prophylaxis:  Mechanical DVT prophylaxis orders are present.     AM-PAC 6 Clicks Score (PT): 15 (09/12/22 0800)    CODE STATUS:   Code Status and Medical Interventions:   Ordered at: 09/11/22 1644     Level Of Support Discussed With:    Patient     Code Status (Patient has no pulse and is not breathing):    No CPR (Do Not Attempt to Resuscitate)     Medical Interventions (Patient has pulse or is breathing):    Full Support       Jes Johnson MD  09/12/22

## 2022-09-12 NOTE — THERAPY EVALUATION
Acute Care - Speech Language Pathology Initial Evaluation  [facility]   Cognitive-Communication Evaluation  Clinical Swallow Evaluation     Patient Name: Nehal Parker  : 2/15/1926  MRN: [id number]  Today's Date: 2022               Admit Date: 2022     Visit Dx:    ICD-10-CM ICD-9-CM   1. Acute ischemic right MCA stroke (HCC)  I63.511 434.91   2. Elevated blood pressure reading with diagnosis of hypertension  I10 401.9   3. DNR (do not resuscitate)  Z66 V49.86     Patient Active Problem List   Diagnosis   • Acute left-sided weakness   • Acute CVA (cerebrovascular accident) (HCC)   • HLD (hyperlipidemia)   • Essential hypertension   • Hypothyroidism (acquired)   • Mild depression     Past Medical History:   Diagnosis Date   • Depression    • Disease of thyroid gland    • Hypertension      No past surgical history on file.    SLP Recommendation and Plan  SLP Diagnosis: Very mild dysarthria, otherwise cog-comm WNL (22)        Swallow Criteria for Skilled Therapeutic Interventions Met: demonstrates skilled criteria (22)  SLC Criteria for Skilled Therapy Interventions Met: yes (22)  Anticipated Discharge Disposition (SLP): unknown, anticipate therapy at next level of care (22)     Therapy Frequency (Swallow): PRN (22)  Predicted Duration Therapy Intervention (Days): until discharge (22)                    Plan of Care Reviewed With: patient (22 1048)      SLP EVALUATION (last 72 hours)     SLP SLC Evaluation     Row Name 22                   Communication Assessment/Intervention    Document Type evaluation  -MP        Subjective Information no complaints  -MP        Patient Observations alert;cooperative  -MP        Patient/Family/Caregiver Comments/Observations No family present  -MP        Patient Effort good  -MP                  General Information    Patient Profile Reviewed yes  -MP        Pertinent History Of  Current Problem Adm 9/11 w/ L side wkns, CVA w/u. CT cerebral perf w/ abnormal diffusion R MCA territory. MRI pending. Hx TIA, PE, HTN, HLD, hypothyroid. Lives in assisted living facility.  -MP        Precautions/Limitations, Vision WFL  -MP        Precautions/Limitations, Hearing WFL;for purposes of eval  -MP        Prior Level of Function-Communication WFL  -MP        Plans/Goals Discussed with patient;agreed upon  -MP        Barriers to Rehab none identified  -MP        Patient's Goals for Discharge patient did not state  -MP                  Pain    Additional Documentation Pain Scale: FACES Pre/Post-Treatment (Group)  -MP                  Pain Scale: FACES Pre/Post-Treatment    Pain: FACES Scale, Pretreatment 0-->no hurt  -MP        Posttreatment Pain Rating 0-->no hurt  -MP                  Comprehension Assessment/Intervention    Comprehension Assessment/Intervention Auditory Comprehension  -MP                  Auditory Comprehension Assessment/Intervention    Auditory Comprehension (Communication) WFL  -MP                  Expression Assessment/Intervention    Expression Assessment/Intervention verbal expression  -MP                  Verbal Expression Assessment/Intervention    Verbal Expression WFL  -MP                  Oral Motor Structure and Function    Oral Motor Structure and Function WFL  -MP        Dentition Assessment natural, present and adequate  -MP                  Oral Musculature and Cranial Nerve Assessment    Oral Motor General Assessment oral labial or buccal impairment  -MP        Oral Labial or Buccal Impairment, Detail, Cranial Nerve VII (Facial): left labial droop  -MP                  Motor Speech Assessment/Intervention    Motor Speech Function mild impairment  -MP        Characteristics Consistent with Dysarthria decreased articulation  -MP                  Cognitive Assessment Intervention- SLP    Cognitive Function (Cognition) WFL  -MP                  SLP Evaluation Clinical  Impressions    SLP Diagnosis Very mild dysarthria, otherwise cog-comm WNL  -MP        Rehab Potential/Prognosis good  -MP        SLC Criteria for Skilled Therapy Interventions Met yes  -MP        Functional Impact difficulty in expressing complex messages;functional impact in social situations;restrictions in personal and social life  -MP                  Recommendations    Therapy Frequency (SLP SLC) 3 days per week  -MP        Predicted Duration Therapy Intervention (Days) until discharge  -MP        Anticipated Discharge Disposition (SLP) unknown;anticipate therapy at next level of care  -MP              User Key  (r) = Recorded By, (t) = Taken By, (c) = Cosigned By    Initials Name Effective Dates    MP Estrada Lindsey, MS CCC-SLP 12/28/21 -                    EDUCATION  The patient has been educated in the following areas:     Cognitive Impairment Communication Impairment.           SLP GOALS     Row Name 09/12/22 0935             (LTG) Patient will demonstrate functional swallow for    Diet Texture (Demonstrate functional swallow) regular textures  -MP      Liquid viscosity (Demonstrate functional swallow) thin liquids  -MP      [place] (Demonstrate functional swallow) independently (over 90% accuracy)  -MP      Time Frame (Demonstrate functional swallow) by discharge  -MP              (STG) Patient will tolerate trials of    Consistencies Trialed (Tolerate trials) regular textures;thin liquids  -MP      Desired Outcome (Tolerate trials) without signs/symptoms of aspiration;without signs of distress  -MP      [place] (Tolerate trials) independently (over 90% accuracy)  -MP      Time Frame (Tolerate trials) by discharge  -MP              Articulation Goal 1 (SLP)    Improve Articulation Goal 1 (SLP) by over-articulating in connected speech;80%;with minimal cues (75-90%)  -MP      Time Frame (Articulation Goal 1, SLP) short term goal (STG)  -MP              Additional Goal 1 (SLP)    Additional  Goal 1, SLP LTG: Pt will improve communication in order to fully participate in care while in hospital setting w/ 100% acc and no cues  -MP      Time Frame (Additional Goal 1, SLP) by discharge  -MP            User Key  (r) = Recorded By, (t) = Taken By, (c) = Cosigned By    Initials Name Provider Type    Estrada Bassett MS CCC-SLP Speech and Language Pathologist                        Time Calculation:      Time Calculation- SLP     Row Name 22 1049             Time Calculation- SLP    SLP Start Time 0935  -MP      SLP Received On 22  -MP              Untimed Charges    [id number] Eval Speech and Production w/ Language Minutes 25  -MP      [id number] Eval Oral Pharyng Swallow Minutes 25  -MP              Total Minutes    Untimed Charges Total Minutes 50  -MP       Total Minutes 50  -MP            User Key  (r) = Recorded By, (t) = Taken By, (c) = Cosigned By    Initials Name Provider Type    Estrada Bassett MS CCC-SLP Speech and Language Pathologist                Therapy Charges for Today     Code Description Service Date Service Provider Modifiers Qty    42644226997 HC ST EVAL ORAL PHARYNG SWALLOW 2 2022 Estrada Lindsey MS CCC-SLP GN 1    [id number] HC ST EVAL SPEECH AND PROD W LANG  2 2022 Estrada Lindsey MS CCC-SLP GN 1                     Estrada Glaser MS CCC-SLP  2022 and Acute Care - Speech Language Pathology   Swallow Initial Evaluation [facility]     Patient Name: Nehal Parker  : 2/15/1926  MRN: [id number]  Today's Date: 2022               Admit Date: 2022    Visit Dx:     ICD-10-CM ICD-9-CM   1. Acute ischemic right MCA stroke (HCC)  I63.511 434.91   2. Elevated blood pressure reading with diagnosis of hypertension  I10 401.9   3. DNR (do not resuscitate)  Z66 V49.86     Patient Active Problem List   Diagnosis   • Acute left-sided weakness   • Acute CVA (cerebrovascular accident) (HCC)   • HLD (hyperlipidemia)   •  Essential hypertension   • Hypothyroidism (acquired)   • Mild depression     Past Medical History:   Diagnosis Date   • Depression    • Disease of thyroid gland    • Hypertension      No past surgical history on file.    SLP Recommendation and Plan  SLP Swallowing Diagnosis: R/O pharyngeal dysphagia (09/12/22 0935)  SLP Diet Recommendation: regular textures, thin liquids (09/12/22 0935)  Recommended Precautions and Strategies: upright posture during/after eating, small bites of food and sips of liquid, general aspiration precautions (09/12/22 0935)  SLP Rec. for Method of Medication Administration: as tolerated (09/12/22 0935)     Monitor for Signs of Aspiration: yes, notify SLP if any concerns (09/12/22 0935)     Swallow Criteria for Skilled Therapeutic Interventions Met: demonstrates skilled criteria (09/12/22 0935)  Anticipated Discharge Disposition (SLP): unknown, anticipate therapy at next level of care (09/12/22 0935)  Rehab Potential/Prognosis, Swallowing: good, to achieve stated therapy goals (09/12/22 0935)  Therapy Frequency (Swallow): PRN (09/12/22 0935)  Predicted Duration Therapy Intervention (Days): until discharge (09/12/22 0935)                                  Plan of Care Reviewed With: patient      SWALLOW EVALUATION (last 72 hours)     SLP Adult Swallow Evaluation     Row Name 09/12/22 0935                   General Information    Current Method of Nutrition NPO  -MP        Prior Level of Function-Communication WFL  -MP        Prior Level of Function-Swallowing no diet consistency restrictions  -MP        Patient's Goals for Discharge patient did not state  -MP                  Oral Motor Structure and Function    Secretion Management WNL/WFL  -MP        Mucosal Quality moist, healthy  -MP                  General Eating/Swallowing Observations    Respiratory Support Currently in Use room air  -MP        Eating/Swallowing Skills self-fed;fed by SLP;appropriate self-feeding skills observed  -MP         Positioning During Eating upright 90 degree;upright in bed  -MP        Utensils Used spoon;cup;straw  -MP        Consistencies Trialed thin liquids;pureed;regular textures  -MP                  Clinical Swallow Eval    Pharyngeal Phase no overt signs/symptoms of pharyngeal impairment  -MP        Clinical Swallow Evaluation Summary No s/sxs aspiration/distress observed, even when pushed w/ multiple large/consecutive sips of thin liquid. Given location of possible stroke, will f/u for assessment of diet tolerance prior to signing off.  -MP                  SLP Evaluation Clinical Impression    SLP Swallowing Diagnosis R/O pharyngeal dysphagia  -MP        Functional Impact risk of aspiration/pneumonia  -MP        Rehab Potential/Prognosis, Swallowing good, to achieve stated therapy goals  -MP        Swallow Criteria for Skilled Therapeutic Interventions Met demonstrates skilled criteria  -MP                  Recommendations    Therapy Frequency (Swallow) PRN  -MP        SLP Diet Recommendation regular textures;thin liquids  -MP        Recommended Precautions and Strategies upright posture during/after eating;small bites of food and sips of liquid;general aspiration precautions  -MP        Oral Care Recommendations Oral Care BID/PRN  -MP        SLP Rec. for Method of Medication Administration as tolerated  -MP        Monitor for Signs of Aspiration yes;notify SLP if any concerns  -MP              User Key  (r) = Recorded By, (t) = Taken By, (c) = Cosigned By    Initials Name Effective Dates    MP Estrada Lindsey MS CCC-SLP 12/28/21 -                 EDUCATION  The patient has been educated in the following areas:   Dysphagia (Swallowing Impairment).        SLP GOALS     Row Name 09/12/22 0935             (LTG) Patient will demonstrate functional swallow for    Diet Texture (Demonstrate functional swallow) regular textures  -MP      Liquid viscosity (Demonstrate functional swallow) thin liquids  -MP       [place] (Demonstrate functional swallow) independently (over 90% accuracy)  -MP      Time Frame (Demonstrate functional swallow) by discharge  -MP              (STG) Patient will tolerate trials of    Consistencies Trialed (Tolerate trials) regular textures;thin liquids  -MP      Desired Outcome (Tolerate trials) without signs/symptoms of aspiration;without signs of distress  -MP      [place] (Tolerate trials) independently (over 90% accuracy)  -MP      Time Frame (Tolerate trials) by discharge  -MP              Articulation Goal 1 (SLP)    Improve Articulation Goal 1 (SLP) by over-articulating in connected speech;80%;with minimal cues (75-90%)  -MP      Time Frame (Articulation Goal 1, SLP) short term goal (STG)  -MP              Additional Goal 1 (SLP)    Additional Goal 1, SLP LTG: Pt will improve communication in order to fully participate in care while in hospital setting w/ 100% acc and no cues  -MP      Time Frame (Additional Goal 1, SLP) by discharge  -MP            User Key  (r) = Recorded By, (t) = Taken By, (c) = Cosigned By    Initials Name Provider Type    Estrada Bassett MS CCC-SLP Speech and Language Pathologist                   Time Calculation:    Time Calculation- SLP     Row Name 09/12/22 1049             Time Calculation- SLP    SLP Start Time 0935  -MP      SLP Received On 09/12/22  -MP              Untimed Charges    [id number] Eval Speech and Production w/ Language Minutes 25  -MP      [id number] Eval Oral Pharyng Swallow Minutes 25  -MP              Total Minutes    Untimed Charges Total Minutes 50  -MP       Total Minutes 50  -MP            User Key  (r) = Recorded By, (t) = Taken By, (c) = Cosigned By    Initials Name Provider Type    Estrada Bassett MS CCC-SLP Speech and Language Pathologist                Therapy Charges for Today     Code Description Service Date Service Provider Modifiers Qty    [id number] HC ST EVAL ORAL PHARYNG SWALLOW 2 9/12/2022 Radha  Estrada Glaser, MS CCC-SLP GN 1    [id number]  ST EVAL SPEECH AND PROD W LANG  2 9/12/2022 Estrada Lindsey MS CCC-SLP GN 1               Estrada Glaser MS CCC-SLP  9/12/2022

## 2022-09-12 NOTE — THERAPY EVALUATION
Patient Name: Nehal Parker  : 2/15/1926    MRN: [id number]                              Today's Date: 2022       Admit Date: 2022    Visit Dx:     ICD-10-CM ICD-9-CM   1. Acute ischemic right MCA stroke (HCC)  I63.511 434.91   2. Elevated blood pressure reading with diagnosis of hypertension  I10 401.9   3. DNR (do not resuscitate)  Z66 V49.86     Patient Active Problem List   Diagnosis   • Acute left-sided weakness   • Acute CVA (cerebrovascular accident) (HCC)   • HLD (hyperlipidemia)   • Essential hypertension   • Hypothyroidism (acquired)   • Mild depression     Past Medical History:   Diagnosis Date   • Depression    • Disease of thyroid gland    • Hypertension      No past surgical history on file.   General Information     Row Name 22 1351          OT Time and Intention    Document Type evaluation  -JY     Mode of Treatment occupational therapy;individual therapy  -JY     Row Name 22 1351          General Information    Patient Profile Reviewed yes  -JY     Prior Level of Function independent:;feeding;grooming;dressing;bed mobility;transfer;min assist:;all household mobility;gait;dependent:;home management;cleaning;driving  grossly I in ADLs, able to prepare simple light meal for breakfast, receives staff A at facility for laundry, cooking, cleaning, other meal mgmt and uses 4WW for in home, facility mobility  -JY     Existing Precautions/Restrictions fall;other (see comments)  baseline numbness at R 4th and 5th digits, [place] , recent fall hx  -JY     Barriers to Rehab medically complex;previous functional deficit;hearing deficit  -JY     Row Name 22 1351          Occupational Profile    Environmental Supports and Barriers (Occupational Profile) shower w/ threshold, standard toilet with riser and B arm rests atop toilet, facility resources  -JY     Row Name 22 1351          Living Environment    People in Home alone;facility resident;other (see comments)  ysabel dalton  [facility]  -JY     Row Name 09/12/22 1351          Home Main Entrance    Number of Stairs, Main Entrance none  -JY     Stair Railings, Main Entrance none  -JY     Row Name 09/12/22 1351          Stairs Within Home, Primary    Stair Railings, Within Home, Primary none  -JY     Row Name 09/12/22 1351          Cognition    Orientation Status (Cognition) oriented x 4  -JY     Row Name 09/12/22 1351          Safety Issues, Functional Mobility    Safety Issues Affecting Function (Mobility) awareness of need for assistance;insight into deficits/self-awareness;positioning of assistive device;safety precaution awareness;safety precautions follow-through/compliance;sequencing abilities  -JY     Impairments Affecting Function (Mobility) balance;endurance/activity tolerance;postural/trunk control;sensation/sensory awareness;strength  -JY     Comment, Safety Issues/Impairments (Mobility) pt alert and able to follow commands; presents with anterior and posterior lean in standing, increased cues and weight shifting A For neutral alignment  -JY           User Key  (r) = Recorded By, (t) = Taken By, (c) = Cosigned By    Initials Name Provider Type    Jessica Higgins OT Occupational Therapist                 Mobility/ADL's     Row Name 09/12/22 1357          Bed Mobility    Bed Mobility supine-sit;scooting/bridging  -JY     Scooting/Bridging [place] (Bed Mobility) minimum assist (75% patient effort);verbal cues  -JY     Supine-Sit [place] (Bed Mobility) moderate assist (50% patient effort);verbal cues  -JY     Bed Mobility, Safety Issues decreased use of arms for pushing/pulling;decreased use of legs for bridging/pushing;impaired trunk control for bed mobility  -JY     Assistive Device (Bed Mobility) bed rails;head of bed elevated  -JY     Comment, (Bed Mobility) skilled cues for optimal sequencing and hand placement to advance LEs over edge of bed and push through RUE to sit upright into sitting, increased time and effort to  progress LLE and mild difficulty reaching with LUE to bed rail and req'd gross mod A to move LLE over EOB and progress trunk into sitting, min A to bring hips to EOB and achieve symmetry; denied any dizziness or feeling LH at EOB  -[facility] 09/12/22 [facility]          Transfers    Transfers sit-stand transfer;stand-sit transfer  -     Comment, (Transfers) skilled cues for optimal hand placement for controlled ascend, descend specifically to push up from seated surface and reach back prior to sitting; in standing provided cues for anterior weight shift to offset posterior lean and briefly tactile cues to come back to neutral alignment; used FWW for support  -     Sit-Stand [place] (Transfers) minimum assist (75% patient effort);2 person assist;verbal cues  -     Stand-Sit [place] (Transfers) minimum assist (75% patient effort);2 person assist;verbal cues  -JY     Row Name 09/12/22 [facility]          Sit-Stand Transfer    Assistive Device (Sit-Stand Transfers) walker, front-wheeled  -JY     Row Name 09/12/22 [facility]          Stand-Sit Transfer    Assistive Device (Stand-Sit Transfers) walker, front-wheeled  -JY     Row Name 09/12/22 [facility]          Functional Mobility    Functional Mobility- Ind. Level minimum assist (75% patient effort);2 person assist required;verbal cues required  -     Functional Mobility- Device walker, front-wheeled  -     Functional Mobility-Distance (Feet) --  forward, backward and lateral stepping for movement at eOB  -     Functional Mobility- Safety Issues other (see comments)  narrow DAWOOD, posterior and anterior lean instability  -JY     Row Name 09/12/22 [facility]          Activities of Daily Living    BADL Assessment/Intervention upper body dressing;lower body dressing;grooming;toileting  -JY     Row Name 09/12/22 [facility]          Upper Body Dressing Assessment/Training    [place] Level (Upper Body Dressing) doff;don;pajama/robe;minimum assist (75% patient effort);verbal cues   -JY     Position (Upper Body Dressing) unsupported sitting;edge of bed sitting  -Y     Comment, (Upper Body Dressing) min A for gross posterior and proximal mgmt of gown, initiated training for optimal sequencing for threading and unthreadiing given slightly more impacted LUE  -Y     Row Name 09/12/22 1357          Lower Body Dressing Assessment/Training    [place] Level (Lower Body Dressing) socks;doff;standby assist;don;dependent (less than 25% patient effort)  -JY     Position (Lower Body Dressing) unsupported sitting;edge of bed sitting  -JY     Comment, (Lower Body Dressing) pt able to doff sock with SBA for close monitoring of pt balance with distal reach away from DAWOOD and dependent A For re donning and pt u/a to reach distally and maintain balance or energy to initiate threading over toes  -     Row Name 09/12/22 1357          Grooming Assessment/Training    [place] Level (Grooming) wash face, hands;set up  -     Row Name 09/12/22 1357          Toileting Assessment/Training    Assistive Devices (Toileting) other (see comments)  utilized pure wic  -JY     Comment, (Toileting) utilized pure wic  -JY           User Key  (r) = Recorded By, (t) = Taken By, (c) = Cosigned By    Initials Name Provider Type    Jessica Higgins OT Occupational Therapist               Obj/Interventions     Row Name 09/12/22 1406          Sensory Assessment (Somatosensory)    Sensory Assessment (Somatosensory) bilateral UE;sensation intact  -     Bilateral UE Sensory Assessment general sensation;light touch awareness;light touch localization;intact  -Y     Sensory Assessment denies any numbness or tingling and able to recognize LT stimuli as intact and symmetrical at BUEs; baseline numbness at R hand specifically 4th and 5th digits  -     Row Name 09/12/22 1406          Vision Assessment/Intervention    Visual Impairment/Limitations corrective lenses for reading  -     Vision Assessment Comment grossly able to  identify L & R peripheral input and trace L< >R, up/down, denied any blurred or incomplete vision  -[facility] Name 09/12/22 1406          Range of Motion Comprehensive    General Range of Motion upper extremity range of motion deficits identified  -JY     Comment, General Range of Motion baseline R shoulder flexion ROM deficits from prior injury per pt, able to achieve ~ 90 degrees AROM, > 90 with PROM; otherwise BUE AROM WFL  -[facility] Name 09/12/22 1406          Strength Comprehensive (MMT)    General Manual Muscle Testing (MMT) Assessment upper extremity strength deficits identified  -JY     Comment, General Manual Muscle Testing (MMT) Assessment gross BUE MMS 4/5, R shoulder MMS grossly 3 to 3+/5; mild motor drift at LUE  -[facility] Name 09/12/22 1406          Motor Skills    Motor Skills functional endurance;coordination  -     Coordination finger to nose;other (see comments)  finger thumb opposition [facility]  -     Functional Endurance decreased activity tolerance toward more dynamic tasks  -[facility] Name 09/12/22 1406          Balance    Balance Assessment sitting static balance;sitting dynamic balance;standing static balance;standing dynamic balance  -     Static Sitting Balance supervision  -     Dynamic Sitting Balance standby assist  -J     Position, Sitting Balance supported;unsupported;sitting edge of bed  -     Static Standing Balance minimal assist;2-person assist;verbal cues  -     Dynamic Standing Balance minimal assist;2-person assist;verbal cues  -     Position/Device Used, Standing Balance supported;walker, front-wheeled  -Y     Balance Interventions sitting;standing;static;dynamic;sit to stand;supported;occupation based/functional task  -J     Comment, Balance pt did not present with any overt LOB during seated tasks, concern for balance when seated and reaching away from DAWOOD, both posterior and anterior lean noted in standing  -JY           User Key  (r) = Recorded By, (t) =  Taken By, (c) = Cosigned By    Initials Name Provider Type    Jessica Higgins, BHUPENDRA Occupational Therapist               Goals/Plan     Row Name 09/12/22 1419          Transfer Goal 1 (OT)    Activity/Assistive Device (Transfer Goal 1, OT) sit-to-stand/stand-to-sit;bed-to-chair/chair-to-bed;toilet;commode;commode, bedside without drop arms;walker, rolling  -JY     [place] Level/Cues Needed (Transfer Goal 1, OT) minimum assist (75% or more patient effort);other (see comments)  x1  -JY     Time Frame (Transfer Goal 1, OT) long term goal (LTG);by discharge  -JY     Progress/Outcome (Transfer Goal 1, OT) goal ongoing  -JY     Row Name 09/12/22 1419          Dressing Goal 1 (OT)    Activity/Device (Dressing Goal 1, OT) upper body dressing;lower body dressing;other (see comments)  d/d TB garments with AE PRN  -JY     [place]/Cues Needed (Dressing Goal 1, OT) contact guard required;minimum assist (75% or more patient effort);verbal cues required  -JY     Time Frame (Dressing Goal 1, OT) long term goal (LTG);by discharge  -JY     Progress/Outcome (Dressing Goal 1, OT) goal ongoing  -JY     Row Name 09/12/22 1419          Toileting Goal 1 (OT)    Activity/Device (Toileting Goal 1, OT) adjust/manage clothing;perform perineal hygiene;commode;grab bar/safety frame;raised toilet seat  -JY     [place] Level/Cues Needed (Toileting Goal 1, OT) minimum assist (75% or more patient effort);verbal cues required  -JY     Time Frame (Toileting Goal 1, OT) long term goal (LTG);by discharge  -JY     Progress/Outcome (Toileting Goal 1, OT) goal ongoing  -JY     Row Name 09/12/22 1419          Strength Goal 1 (OT)    Strength Goal 1 (OT) Pt to complete seated HEP encompassing BUEs focused on strength and endurance w/ progressive sets/reps/resistance in order to progress in integration in ADLs, related t/fs  -JY     Time Frame (Strength Goal 1, OT) long term goal (LTG);by discharge  -JY     Progress/Outcome (Strength Goal 1,  OT) goal ongoing  -JOTONIEL     Row Name 09/12/22 1419          Problem Specific Goal 1 (OT)    Time Frame (Problem Specific Goal 1, OT) long term goal (LTG);by discharge  -JY     Progress/Outcome (Problem Specific Goal 1, OT) goal ongoing  -EYAD     Row Name 09/12/22 1419          Therapy Assessment/Plan (OT)    Planned Therapy Interventions (OT) activity tolerance training;adaptive equipment training;BADL retraining;functional balance retraining;occupation/activity based interventions;patient/caregiver education/training;ROM/therapeutic exercise;strengthening exercise;transfer/mobility retraining  -J           User Key  (r) = Recorded By, (t) = Taken By, (c) = Cosigned By    Initials Name Provider Type    Jessica Higgins, BHUPENDRA Occupational Therapist               Clinical Impression     Row Name 09/12/22 1412          Pain Assessment    Pretreatment Pain Rating 0/10 - no pain  -JY     Posttreatment Pain Rating 0/10 - no pain  -JY     Pre/Posttreatment Pain Comment denies any pain and tolerated all OT interventions  -J     Pain Intervention(s) Repositioned;Ambulation/increased activity  -     Row Name 09/12/22 1412          Plan of Care Review    Plan of Care Reviewed With patient  -JY     Progress no change  OT IE  -JY     Outcome Evaluation OT evaluation completed. Pt presents with decreased I in ADLs, related t/fs, mobility compared to PLOF limited by decreased fxl endurance, impaired balance, muscle weakness at BUEs, decreased distal reach toward LB impacting LB ADLs and postural, anterior and posterior lean in standing. Pt completed supine > sitting with mod A, scooting to EOB with min A, STS at EOB and multi directional stepping with FWW with min A x 2. Skilled cues for weight shifting to improve balance in standing. Pt completed d/d gown with min A, doffed sock with SBA, req'd dep care to re don d/t balance concerns with distal reach and SUA for face/hand washing. Pt with mild motor drift and more weakness at L  UE, largely L shoulder thus educated on optimal seq for threading and unthreading garments for dressing. Pt has recent fall hx. Recommend IPOT POC and IRF based on underlying impairments and impact on function and said fall hx all pending progress.  -JY     Row Name 09/12/22 1412          Therapy Assessment/Plan (OT)    Patient/Family Therapy Goal Statement (OT) to increase I in ADLs, related t/fs, return to PLOF  -JY     Rehab Potential (OT) good, to achieve stated therapy goals  -JY     Criteria for Skilled Therapeutic Interventions Met (OT) yes;skilled treatment is necessary  -JY     Therapy Frequency (OT) daily  -JY     Row Name 09/12/22 1412          Therapy Plan Review/Discharge Plan (OT)    Anticipated Discharge Disposition (OT) inpatient rehabilitation facility  -     Row Name 09/12/22 1412          Vital Signs    Pre Systolic BP Rehab 171  -JY     Pre Treatment Diastolic BP 81  -JY     Post Systolic BP Rehab 146  -JY     Post Treatment Diastolic BP 68  -JY     Pretreatment Heart Rate (beats/min) 67  -JY     Posttreatment Heart Rate (beats/min) 60  -JY     Pre SpO2 (%) 98  -JY     O2 Delivery Pre Treatment room air  -JY     O2 Delivery Intra Treatment room air  -JY     Post SpO2 (%) 99  -JY     O2 Delivery Post Treatment room air  -JY     Pre Patient Position Supine  -JY     Intra Patient Position Standing  -JY     Post Patient Position Supine  -JY     Row Name 09/12/22 1412          Positioning and Restraints    Pre-Treatment Position in bed  -JY     Post Treatment Position bed  -JY     In Bed notified nsg;fowlers;call light within reach;encouraged to call for assist;exit alarm on;side rails up x2;legs elevated  -JY           User Key  (r) = Recorded By, (t) = Taken By, (c) = Cosigned By    Initials Name Provider Type    Jessica Higgins, OT Occupational Therapist               Outcome Measures     Row Name 09/12/22 1422          How much help from another is currently needed...    Putting on and taking  off regular lower body clothing? 2  -JY     Bathing (including washing, rinsing, and drying) 2  -JY     Toileting (which includes using toilet bed pan or urinal) 2  -JY     Putting on and taking off regular upper body clothing 3  -JY     Taking care of personal grooming (such as brushing teeth) 3  -JY     Eating meals 3  -JY     AM-PAC 6 Clicks Score (OT) 15  -JY     Row Name 09/12/22 0800          How much help from another person do you currently need...    Turning from your back to your side while in flat bed without using bedrails? 3  -AL     Moving from lying on back to sitting on the side of a flat bed without bedrails? 3  -AL     Moving to and from a bed to a chair (including a wheelchair)? 3  -AL     Standing up from a chair using your arms (e.g., wheelchair, bedside chair)? 2  -AL     Climbing 3-5 steps with a railing? 2  -AL     To walk in hospital room? 2  -AL     AM-PAC 6 Clicks Score (PT) 15  -AL     Highest level of mobility 4 --> Transferred to chair/commode  -AL     Row Name 09/12/22 1422          Functional Assessment    Outcome Measure Options AM-PAC 6 Clicks Daily Activity (OT)  -JY           User Key  (r) = Recorded By, (t) = Taken By, (c) = Cosigned By    Initials Name Provider Type    Maite Maldonado RN Registered Nurse    Jessica Higgins OT Occupational Therapist                Occupational Therapy Education                 Title: PT OT SLP Therapies (In Progress)     Topic: Occupational Therapy (In Progress)     Point: ADL training (In Progress)     Description:   Instruct learner(s) on proper safety adaptation and remediation techniques during self care or transfers.   Instruct in proper use of assistive devices.              Learning Progress Summary           Patient Acceptance, E,D, NR by EYAD at 9/12/2022 1131                   Point: Home exercise program (Not Started)     Description:   Instruct learner(s) on appropriate technique for monitoring, assisting and/or progressing  therapeutic exercises/activities.              Learner Progress:  Not documented in this visit.          Point: Precautions (In Progress)     Description:   Instruct learner(s) on prescribed precautions during self-care and functional transfers.              Learning Progress Summary           Patient Acceptance, E,D, NR by EYAD at 9/12/2022 1131                   Point: Body mechanics (In Progress)     Description:   Instruct learner(s) on proper positioning and spine alignment during self-care, functional mobility activities and/or exercises.              Learning Progress Summary           Patient Acceptance, E,D, NR by EYAD at 9/12/2022 1131                               User Key     Initials Effective Dates Name Provider Type Discipline    LISSETHOTONIEL 06/16/21 -  Jessica Rm, OT Occupational Therapist OT              OT Recommendation and Plan  Planned Therapy Interventions (OT): activity tolerance training, adaptive equipment training, BADL retraining, functional balance retraining, occupation/activity based interventions, patient/caregiver education/training, ROM/therapeutic exercise, strengthening exercise, transfer/mobility retraining  Therapy Frequency (OT): daily  Plan of Care Review  Plan of Care Reviewed With: patient  Progress: no change (OT IE)  Outcome Evaluation: OT evaluation completed. Pt presents with decreased I in ADLs, related t/fs, mobility compared to PLOF limited by decreased fxl endurance, impaired balance, muscle weakness at BUEs, decreased distal reach toward LB impacting LB ADLs and postural, anterior and posterior lean in standing. Pt completed supine > sitting with mod A, scooting to EOB with min A, STS at EOB and multi directional stepping with FWW with min A x 2. Skilled cues for weight shifting to improve balance in standing. Pt completed d/d gown with min A, doffed sock with SBA, req'd dep care to re don d/t balance concerns with distal reach and SUA for face/hand washing. Pt with mild  motor drift and more weakness at L UE, largely L shoulder thus educated on optimal seq for threading and unthreading garments for dressing. Pt has recent fall hx. Recommend IPOT POC and IRF based on underlying impairments and impact on function and said fall hx all pending progress.     Time Calculation:    Time Calculation- OT     Row Name 09/12/22 1423             Time Calculation- OT    OT Start Time 1131  -JY      OT Received On 09/12/22  -JY      OT Goal Re-Cert Due Date 09/22/22  -JY              Timed Charges    05306 - OT Therapeutic Activity Minutes 10  -JY      62663 - OT Self Care/Mgmt Minutes 9  -JY              Untimed Charges    OT Eval/Re-eval Minutes 48  -JY              Total Minutes    Timed Charges Total Minutes 19  -JY      Untimed Charges Total Minutes 48  -JY       Total Minutes 67  -JY            User Key  (r) = Recorded By, (t) = Taken By, (c) = Cosigned By    Initials Name Provider Type    Jessica Higgins OT Occupational Therapist              Therapy Charges for Today     Code Description Service Date Service Provider Modifiers Qty    06260483394 HC OT THERAPEUTIC ACT EA 15 MIN 9/12/2022 Jessica Rm OT GO 1    25574076456 HC OT EVAL MOD COMPLEXITY 4 9/12/2022 Jessica Rm OT GO 1    [id number]  OT THER SUPP EA 15 MIN 9/12/2022 Jessica Rm OT GO 2               Jessica Rm OT  9/12/2022

## 2022-09-13 ENCOUNTER — APPOINTMENT (OUTPATIENT)
Dept: CT IMAGING | Facility: HOSPITAL | Age: 87
End: 2022-09-13

## 2022-09-13 ENCOUNTER — APPOINTMENT (OUTPATIENT)
Dept: NEUROLOGY | Facility: HOSPITAL | Age: 87
End: 2022-09-13

## 2022-09-13 LAB
ANION GAP SERPL CALCULATED.3IONS-SCNC: 14 MMOL/L (ref 5–15)
BACTERIA UR QL AUTO: ABNORMAL /HPF
BASOPHILS # BLD AUTO: 0.05 10*3/MM3 (ref 0–0.2)
BASOPHILS NFR BLD AUTO: 0.4 % (ref 0–1.5)
BILIRUB UR QL STRIP: NEGATIVE
BUN SERPL-MCNC: 12 MG/DL (ref 8–23)
BUN/CREAT SERPL: 21.1 (ref 7–25)
CALCIUM SPEC-SCNC: 9.9 MG/DL (ref 8.2–9.6)
CHLORIDE SERPL-SCNC: 101 MMOL/L (ref 98–107)
CLARITY UR: ABNORMAL
CO2 SERPL-SCNC: 20 MMOL/L (ref 22–29)
COLOR UR: ABNORMAL
CREAT SERPL-MCNC: 0.57 MG/DL (ref 0.57–1)
D-LACTATE SERPL-SCNC: 2.1 MMOL/L (ref 0.5–2)
DEPRECATED RDW RBC AUTO: 45.3 FL (ref 37–54)
EGFRCR SERPLBLD CKD-EPI 2021: 83.3 ML/MIN/1.73
EOSINOPHIL # BLD AUTO: 0.19 10*3/MM3 (ref 0–0.4)
EOSINOPHIL NFR BLD AUTO: 1.6 % (ref 0.3–6.2)
ERYTHROCYTE [DISTWIDTH] IN BLOOD BY AUTOMATED COUNT: 13.9 % (ref 12.3–15.4)
GLUCOSE SERPL-MCNC: 124 MG/DL (ref 65–99)
GLUCOSE UR STRIP-MCNC: NEGATIVE MG/DL
HCT VFR BLD AUTO: 42.5 % (ref 34–46.6)
HGB BLD-MCNC: 13.8 G/DL (ref 12–15.9)
HGB UR QL STRIP.AUTO: ABNORMAL
HYALINE CASTS UR QL AUTO: ABNORMAL /LPF
IMM GRANULOCYTES # BLD AUTO: 0.04 10*3/MM3 (ref 0–0.05)
IMM GRANULOCYTES NFR BLD AUTO: 0.3 % (ref 0–0.5)
KETONES UR QL STRIP: NEGATIVE
LEUKOCYTE ESTERASE UR QL STRIP.AUTO: ABNORMAL
LYMPHOCYTES # BLD AUTO: 0.89 10*3/MM3 (ref 0.7–3.1)
LYMPHOCYTES NFR BLD AUTO: 7.3 % (ref 19.6–45.3)
MCH RBC QN AUTO: 28.7 PG (ref 26.6–33)
MCHC RBC AUTO-ENTMCNC: 32.5 G/DL (ref 31.5–35.7)
MCV RBC AUTO: 88.4 FL (ref 79–97)
MONOCYTES # BLD AUTO: 0.7 10*3/MM3 (ref 0.1–0.9)
MONOCYTES NFR BLD AUTO: 5.8 % (ref 5–12)
NEUTROPHILS NFR BLD AUTO: 10.28 10*3/MM3 (ref 1.7–7)
NEUTROPHILS NFR BLD AUTO: 84.6 % (ref 42.7–76)
NITRITE UR QL STRIP: NEGATIVE
NRBC BLD AUTO-RTO: 0 /100 WBC (ref 0–0.2)
PH UR STRIP.AUTO: 6.5 [PH] (ref 5–8)
PLATELET # BLD AUTO: 300 10*3/MM3 (ref 140–450)
PMV BLD AUTO: 10.3 FL (ref 6–12)
POTASSIUM SERPL-SCNC: 4.1 MMOL/L (ref 3.5–5.2)
PROT UR QL STRIP: ABNORMAL
RBC # BLD AUTO: 4.81 10*6/MM3 (ref 3.77–5.28)
RBC # UR STRIP: ABNORMAL /HPF
REF LAB TEST METHOD: ABNORMAL
SODIUM SERPL-SCNC: 135 MMOL/L (ref 136–145)
SP GR UR STRIP: 1.01 (ref 1–1.03)
SQUAMOUS #/AREA URNS HPF: ABNORMAL /HPF
STARCH GRANULES URNS QL MICRO: ABNORMAL /HPF
UROBILINOGEN UR QL STRIP: ABNORMAL
WBC # UR STRIP: ABNORMAL /HPF
WBC NRBC COR # BLD: 12.15 10*3/MM3 (ref 3.4–10.8)

## 2022-09-13 PROCEDURE — 92526 ORAL FUNCTION THERAPY: CPT

## 2022-09-13 PROCEDURE — 95819 EEG AWAKE AND ASLEEP: CPT

## 2022-09-13 PROCEDURE — 87040 BLOOD CULTURE FOR BACTERIA: CPT | Performed by: PHYSICIAN ASSISTANT

## 2022-09-13 PROCEDURE — 83605 ASSAY OF LACTIC ACID: CPT | Performed by: PHYSICIAN ASSISTANT

## 2022-09-13 PROCEDURE — 87086 URINE CULTURE/COLONY COUNT: CPT | Performed by: PHYSICIAN ASSISTANT

## 2022-09-13 PROCEDURE — 85025 COMPLETE CBC W/AUTO DIFF WBC: CPT | Performed by: INTERNAL MEDICINE

## 2022-09-13 PROCEDURE — 70450 CT HEAD/BRAIN W/O DYE: CPT

## 2022-09-13 PROCEDURE — 99233 SBSQ HOSP IP/OBS HIGH 50: CPT | Performed by: PHYSICIAN ASSISTANT

## 2022-09-13 PROCEDURE — 81001 URINALYSIS AUTO W/SCOPE: CPT | Performed by: PHYSICIAN ASSISTANT

## 2022-09-13 PROCEDURE — 80048 BASIC METABOLIC PNL TOTAL CA: CPT | Performed by: INTERNAL MEDICINE

## 2022-09-13 PROCEDURE — 87186 SC STD MICRODIL/AGAR DIL: CPT | Performed by: PHYSICIAN ASSISTANT

## 2022-09-13 PROCEDURE — 99232 SBSQ HOSP IP/OBS MODERATE 35: CPT | Performed by: NURSE PRACTITIONER

## 2022-09-13 PROCEDURE — 97162 PT EVAL MOD COMPLEX 30 MIN: CPT

## 2022-09-13 PROCEDURE — 74176 CT ABD & PELVIS W/O CONTRAST: CPT

## 2022-09-13 RX ORDER — LOSARTAN POTASSIUM 25 MG/1
25 TABLET ORAL
Status: DISCONTINUED | OUTPATIENT
Start: 2022-09-14 | End: 2022-09-13

## 2022-09-13 RX ORDER — LOSARTAN POTASSIUM 50 MG/1
50 TABLET ORAL
Status: DISCONTINUED | OUTPATIENT
Start: 2022-09-13 | End: 2022-09-13

## 2022-09-13 RX ORDER — LOSARTAN POTASSIUM 50 MG/1
50 TABLET ORAL
Status: DISCONTINUED | OUTPATIENT
Start: 2022-09-14 | End: 2022-09-16 | Stop reason: HOSPADM

## 2022-09-13 RX ADMIN — ASPIRIN 81 MG CHEWABLE TABLET 81 MG: 81 TABLET CHEWABLE at 08:20

## 2022-09-13 RX ADMIN — PANTOPRAZOLE SODIUM 40 MG: 40 TABLET, DELAYED RELEASE ORAL at 08:21

## 2022-09-13 RX ADMIN — Medication 10 ML: at 08:22

## 2022-09-13 RX ADMIN — AZTREONAM 2 G: 2 INJECTION, POWDER, LYOPHILIZED, FOR SOLUTION INTRAMUSCULAR; INTRAVENOUS at 22:04

## 2022-09-13 RX ADMIN — LOSARTAN POTASSIUM 50 MG: 50 TABLET, FILM COATED ORAL at 08:21

## 2022-09-13 RX ADMIN — SODIUM CHLORIDE 500 ML: 9 INJECTION, SOLUTION INTRAVENOUS at 23:20

## 2022-09-13 RX ADMIN — ATORVASTATIN CALCIUM 40 MG: 40 TABLET, FILM COATED ORAL at 21:00

## 2022-09-13 RX ADMIN — LEVOTHYROXINE SODIUM 112 MCG: 0.11 TABLET ORAL at 08:21

## 2022-09-13 RX ADMIN — Medication 10 ML: at 21:00

## 2022-09-13 RX ADMIN — SERTRALINE HYDROCHLORIDE 25 MG: 25 TABLET ORAL at 08:21

## 2022-09-13 NOTE — THERAPY TREATMENT NOTE
Acute Care - Speech Language Pathology   Swallow Treatment Note [facility]     Patient Name: Nehal Parker  : 2/15/1926  MRN: [id number]  Today's Date: 2022               Admit Date: 2022    Visit Dx:     ICD-10-CM ICD-9-CM   1. Acute ischemic right MCA stroke (HCC)  I63.511 434.91   2. Elevated blood pressure reading with diagnosis of hypertension  I10 401.9   3. DNR (do not resuscitate)  Z66 V49.86     Patient Active Problem List   Diagnosis   • Acute left-sided weakness   • Acute CVA (cerebrovascular accident) (HCC)   • HLD (hyperlipidemia)   • Essential hypertension   • Hypothyroidism (acquired)   • Mild depression     Past Medical History:   Diagnosis Date   • Depression    • Disease of thyroid gland    • Hypertension      History reviewed. No pertinent surgical history.    SLP Recommendation and Plan  SLP Swallowing Diagnosis: R/O pharyngeal dysphagia (22)  SLP Diet Recommendation: regular textures, thin liquids (22)  Recommended Precautions and Strategies: upright posture during/after eating, small bites of food and sips of liquid, general aspiration precautions (22)  SLP Rec. for Method of Medication Administration: as tolerated (22)     Monitor for Signs of Aspiration: yes, notify SLP if any concerns (22)     Swallow Criteria for Skilled Therapeutic Interventions Met: demonstrates skilled criteria (22)  Anticipated Discharge Disposition (SLP): unknown, anticipate therapy at next level of care (22)  Rehab Potential/Prognosis, Swallowing: good, to achieve stated therapy goals (22)  Therapy Frequency (Swallow): PRN (22)  Predicted Duration Therapy Intervention (Days): until discharge (22)     Daily Summary of Progress (SLP): progress toward functional goals as expected (22)               Treatment Assessment (SLP): Pt accepted trials of thin liquid via straw only. Pt  declined puree and solid trials. Pt and RN report no swallowing difficulty during meals today. Pt with no overt clinical s/sx of aspiration with thin liquid. ST will f/u to monitor tolerance with solids. (09/13/22 1345)  Plan for Continued Treatment (SLP): continue treatment per plan of care (09/13/22 1345)         Plan of Care Reviewed With: patient      SWALLOW EVALUATION (last 72 hours)     SLP Adult Swallow Evaluation     Row Name 09/13/22 4856 09/12/22 2793                Rehab Evaluation    Document Type therapy note (daily note)  -KL --       Subjective Information no complaints  -KL --       Patient Observations alert;cooperative;agree to therapy  -KL --       Patient/Family/Caregiver Comments/Observations none  -KL --       Patient Effort good  -KL --                General Information    Patient Profile Reviewed yes  -KL --       Pertinent History Of Current Problem See initial eval  -KL --       Current Method of Nutrition regular textures;thin liquids  -KL NPO  -MP       Prior Level of Function-Communication -- WFL  -MP       Prior Level of Function-Swallowing -- no diet consistency restrictions  -MP       Patient's Goals for Discharge -- patient did not state  -MP                Pain    Additional Documentation Pain Scale: FACES Pre/Post-Treatment (Group)  -KL --                Pain Scale: FACES Pre/Post-Treatment    Pain: FACES Scale, Pretreatment 0-->no hurt  -KL --       Posttreatment Pain Rating 0-->no hurt  -KL --                Oral Motor Structure and Function    Secretion Management -- WNL/WFL  -MP       Mucosal Quality -- moist, healthy  -MP                General Eating/Swallowing Observations    Respiratory Support Currently in Use -- room air  -MP       Eating/Swallowing Skills -- self-fed;fed by SLP;appropriate self-feeding skills observed  -MP       Positioning During Eating -- upright 90 degree;upright in bed  -MP       Utensils Used -- spoon;cup;straw  -MP       Consistencies Trialed --  thin liquids;pureed;regular textures  -                Clinical Swallow Eval    Pharyngeal Phase -- no overt signs/symptoms of pharyngeal impairment  -       Clinical Swallow Evaluation Summary -- No s/sxs aspiration/distress observed, even when pushed w/ multiple large/consecutive sips of thin liquid. Given location of possible stroke, will f/u for assessment of diet tolerance prior to signing off.  -                SLP Evaluation Clinical Impression    SLP Swallowing Diagnosis R/O pharyngeal dysphagia  - R/O pharyngeal dysphagia  -       Functional Impact risk of aspiration/pneumonia  - risk of aspiration/pneumonia  -       Rehab Potential/Prognosis, Swallowing good, to achieve stated therapy goals  - good, to achieve stated therapy goals  -       Swallow Criteria for Skilled Therapeutic Interventions Met demonstrates skilled criteria  -KL demonstrates skilled criteria  -                SLP Treatment Clinical Impressions    Treatment Assessment (SLP) Pt accepted trials of thin liquid via straw only. Pt declined puree and solid trials. Pt and RN report no swallowing difficulty during meals today. Pt with no overt clinical s/sx of aspiration with thin liquid. ST will f/u to monitor tolerance with solids.  -KL --       Daily Summary of Progress (SLP) progress toward functional goals as expected  -KL --       Plan for Continued Treatment (SLP) continue treatment per plan of care  - --       Care Plan Review evaluation/treatment results reviewed;care plan/treatment goals reviewed;risks/benefits reviewed;current/potential barriers reviewed;patient/other agree to care plan  - --                Recommendations    Therapy Frequency (Swallow) PRN  - PRN  -       Predicted Duration Therapy Intervention (Days) until discharge  -KL --       SLP Diet Recommendation regular textures;thin liquids  - regular textures;thin liquids  -       Recommended Precautions and Strategies upright posture  during/after eating;small bites of food and sips of liquid;general aspiration precautions  -KL upright posture during/after eating;small bites of food and sips of liquid;general aspiration precautions  -MP       Oral Care Recommendations Oral Care BID/PRN  -KL Oral Care BID/PRN  -MP       SLP Rec. for Method of Medication Administration as tolerated  -KL as tolerated  -MP       Monitor for Signs of Aspiration yes;notify SLP if any concerns  -KL yes;notify SLP if any concerns  -MP       Anticipated Discharge Disposition (SLP) unknown;anticipate therapy at next level of care  -KL --             User Key  (r) = Recorded By, (t) = Taken By, (c) = Cosigned By    Initials Name Effective Dates    MP Estrada Lindsey MS CCC-SLP 12/28/21 -     Delia Ramirez MS CCC-SLP 06/15/21 -                 EDUCATION  The patient has been educated in the following areas:   Dysphagia (Swallowing Impairment) Oral Care/Hydration.        SLP GOALS     Row Name 09/13/22 1345 09/12/22 0935          (LTG) Patient will demonstrate functional swallow for    Diet Texture (Demonstrate functional swallow) regular textures  -KL regular textures  -MP     Liquid viscosity (Demonstrate functional swallow) thin liquids  -KL thin liquids  -MP     [place] (Demonstrate functional swallow) independently (over 90% accuracy)  -KL independently (over 90% accuracy)  -MP     Time Frame (Demonstrate functional swallow) by discharge  -KL by discharge  -MP     Progress/Outcomes (Demonstrate functional swallow) good progress toward goal  -KL --            (STG) Patient will tolerate trials of    Consistencies Trialed (Tolerate trials) regular textures;thin liquids  -KL regular textures;thin liquids  -MP     Desired Outcome (Tolerate trials) without signs/symptoms of aspiration;without signs of distress  -KL without signs/symptoms of aspiration;without signs of distress  -MP     [place] (Tolerate trials) independently (over 90% accuracy)  -KL  independently (over 90% accuracy)  -MP     Time Frame (Tolerate trials) by discharge  -KL by discharge  -MP     Progress/Outcomes (Tolerate trials) good progress toward goal  -KL --     Comment (Tolerate trials) Pt w/ no overt clinical s/sx of aspiration with thin liquids. Pt declined solid.  -KL --            Articulation Goal 1 (SLP)    Improve Articulation Goal 1 (SLP) by over-articulating in connected speech;80%;with minimal cues (75-90%)  -KL by over-articulating in connected speech;80%;with minimal cues (75-90%)  -MP     Time Frame (Articulation Goal 1, SLP) short term goal (STG)  -KL short term goal (STG)  -MP     Progress/Outcomes (Articulation Goal 1, SLP) goal ongoing  -KL --            Additional Goal 1 (SLP)    Additional Goal 1, SLP LTG: Pt will improve communication in order to fully participate in care while in hospital setting w/ 100% acc and no cues  -KL LTG: Pt will improve communication in order to fully participate in care while in hospital setting w/ 100% acc and no cues  -MP     Time Frame (Additional Goal 1, SLP) by discharge  -KL by discharge  -MP           User Key  (r) = Recorded By, (t) = Taken By, (c) = Cosigned By    Initials Name Provider Type    Estrada Bassett MS CCC-SLP Speech and Language Pathologist    Delia Ramirez MS CCC-SLP Speech and Language Pathologist                   Time Calculation:    Time Calculation- SLP     Row Name 09/13/22 1649             Time Calculation- SLP    SLP Start Time 1345  -KL      SLP Received On 09/13/22  -KL              Untimed Charges    SLP Treatment ST Treatment Swallow Minutes  - 65464  -KL      [id number] Treatment Swallow Minutes 55  -KL              Total Minutes    Untimed Charges Total Minutes 55  -KL       Total Minutes 55  -KL            User Key  (r) = Recorded By, (t) = Taken By, (c) = Cosigned By    Initials Name Provider Type    Delia Ramirez MS CCC-SLP Speech and Language Pathologist                Therapy Charges for  Today     Code Description Service Date Service Provider Modifiers Qty    28019901784 HC ST TREATMENT SWALLOW 4 9/13/2022 Delia Colindres, MS CCC-SLP GN 1            Patient was not wearing a face mask and did not exhibit coughing during this therapy encounter.  Procedure performed was not aerosolizing, involved close contact (within 6 feet for at least 15 minutes or longer), and did not involve contact with infectious secretions or specimens.  Therapist used appropriate personal protective equipment including gloves, standard procedure mask and eye protection.  Appropriate PPE was worn during the entire therapy session.  Hand hygiene was completed before and after therapy session.       Delia Colindres MS CCC-SLP  9/13/2022

## 2022-09-13 NOTE — PLAN OF CARE
Goal Outcome Evaluation:  Plan of Care Reviewed With: patient        Progress: no change  Outcome Evaluation: PT eval completed. Due to pt's level of fatigue and impaired ability to follow commands, mobility limited to sitting EOB. Pt able to sit EOB with CGA and perform dynamic reaching activities with max VC. No dizziness/lightheaded with change in position. VSS throughout session. Pt reported fatigue after sitting EOB ~8 minutes. Due to impaired activity tolerance and decreased strength, pt will benefit from IPPT. PT rec IPF upon d/c at this time to return to PLOF.

## 2022-09-13 NOTE — PLAN OF CARE
Goal Outcome Evaluation:  Plan of Care Reviewed With: patient  Progress: no change  Outcome Evaluation: VSS. Patient more lethargic today, however oriented x4. NIH 3 this morning due to lethargy, and drift in LUE and LLE. Blood pressure dose increased and pressure on lower side of 100s systolic. Stroke navigator ordered stat CT head and EEG due to lethargy. Patient remains lethargic but more arouseable as shift progresses. Each neuro check shows improvement in symptoms related to there no longer being a drift in either of the left extremity. Urinalysis collected due to blood tinged urine, culture pending. PT and OT evaluated patient and recommending rehab due to weakness.

## 2022-09-13 NOTE — THERAPY EVALUATION
Patient Name: Nehal Parker  : 2/15/1926    MRN: [id number]                              Today's Date: 2022       Admit Date: 2022    Visit Dx:     ICD-10-CM ICD-9-CM   1. Acute ischemic right MCA stroke (HCC)  I63.511 434.91   2. Elevated blood pressure reading with diagnosis of hypertension  I10 401.9   3. DNR (do not resuscitate)  Z66 V49.86     Patient Active Problem List   Diagnosis   • Acute left-sided weakness   • Acute CVA (cerebrovascular accident) (HCC)   • HLD (hyperlipidemia)   • Essential hypertension   • Hypothyroidism (acquired)   • Mild depression     Past Medical History:   Diagnosis Date   • Depression    • Disease of thyroid gland    • Hypertension      History reviewed. No pertinent surgical history.   General Information     Row Name 22 161          Physical Therapy Time and Intention    Document Type evaluation  -ES     Mode of Treatment physical therapy  -ES     Row Name 22 161          General Information    Patient Profile Reviewed yes  -ES     Prior Level of Function min assist:;all household mobility;gait;transfer;dependent:  [facility] resident  -ES     Existing Precautions/Restrictions fall;other (see comments)  baseline numbness at R 4th and 5th digits, [place] , recent fall hx  -ES     Barriers to Rehab medically complex;previous functional deficit;hearing deficit  -ES     Row Name 22 1612          Living Environment    People in Home facility resident  [facility]  -     Row Name 22 1612          Home Main Entrance    Number of Stairs, Main Entrance none  -ES     Row Name 22 1612          Stairs Within Home, Primary    Number of Stairs, Within Home, Primary none  -ES     Row Name 22 161          Cognition    Orientation Status (Cognition) oriented to;person;time;place;verbal cues/prompts needed for orientation  -ES     Row Name 22 161          Safety Issues, Functional Mobility    Safety Issues Affecting Function (Mobility)  ability to follow commands;awareness of need for assistance;insight into deficits/self-awareness;problem-solving;safety precaution awareness;safety precautions follow-through/compliance;sequencing abilities  -ES     Impairments Affecting Function (Mobility) balance;endurance/activity tolerance;postural/trunk control;sensation/sensory awareness;strength;cognition  -ES     Cognitive Impairments, Mobility Safety/Performance attention;insight into deficits/self-awareness;problem-solving/reasoning;safety precaution awareness;safety precaution follow-through  -ES     Comment, Safety Issues/Impairments (Mobility) Pt lethargic today. Required max VC to follow commands. Posterior lean while sitting EOB.  -ES           User Key  (r) = Recorded By, (t) = Taken By, (c) = Cosigned By    Initials Name Provider Type    ES Svetlana Hicks, PT Physical Therapist               Mobility     Row Name 09/13/22 1616          Bed Mobility    Bed Mobility supine-sit;scooting/bridging  -ES     Scooting/Bridging [place] (Bed Mobility) verbal cues;moderate assist (50% patient effort)  -ES     Supine-Sit [place] (Bed Mobility) moderate assist (50% patient effort);verbal cues  -ES     Assistive Device (Bed Mobility) bed rails;head of bed elevated;draw sheet  -ES     Comment, (Bed Mobility) max VC for sequencing. ModA to advance LEs over EOB.  -ES     Row Name 09/13/22 1616          Transfers    Comment, (Transfers) Not attempted this date due to level of fatigue and decreased ability to follow commands.  -ES     Row Name 09/13/22 1616          Bed-Chair Transfer    Bed-Chair [place] (Transfers) not tested  -ES     Row Name 09/13/22 1616          Sit-Stand Transfer    Sit-Stand [place] (Transfers) not tested  -ES     Row Name 09/13/22 1616          Gait/Stairs (Locomotion)    [place] Level (Gait) not tested  -ES     Comment, (Gait/Stairs) Not attempted due to pt's level of fatigue and impaired ability to follow  commands.  -ES           User Key  (r) = Recorded By, (t) = Taken By, (c) = Cosigned By    Initials Name Provider Type    ES Svetlana Hicks PT Physical Therapist               Obj/Interventions     Row Name 09/13/22 1617          Range of Motion Comprehensive    General Range of Motion lower extremity range of motion deficits identified  -ES     Comment, General Range of Motion RLE ROM WFL. LLE decreased DF.  -ES     Row Name 09/13/22 1617          Strength Comprehensive (MMT)    General Manual Muscle Testing (MMT) Assessment lower extremity strength deficits identified  -ES     Comment, General Manual Muscle Testing (MMT) Assessment RLE 4/5. LLE 3+/5  -ES     Row Name 09/13/22 1617          Motor Skills    Motor Skills functional endurance  -ES     Functional Endurance Pt fatigued after sitting EOB ~8 minutes  -ES     Therapeutic Exercise knee;ankle  -ES     Row Name 09/13/22 1617          Knee (Therapeutic Exercise)    Knee (Therapeutic Exercise) strengthening exercise  -ES     Knee Strengthening (Therapeutic Exercise) bilateral;LAQ (long arc quad);10 repetitions  -ES     Row Name 09/13/22 1617          Ankle (Therapeutic Exercise)    Ankle (Therapeutic Exercise) strengthening exercise  -ES     Ankle Strengthening (Therapeutic Exercise) bilateral;dorsiflexion;plantarflexion;20 repititions  -ES     Row Name 09/13/22 1617          Balance    Balance Assessment sitting static balance;sitting dynamic balance  -ES     Static Sitting Balance contact guard;verbal cues  -ES     Dynamic Sitting Balance minimal assist;verbal cues  -ES     Position, Sitting Balance supported;sitting edge of bed  -ES     Static Standing Balance not tested  -ES     Dynamic Standing Balance not tested  -ES     Balance Interventions sitting;dynamic;core stability exercise;dynamic reaching  -ES     Comment, Balance No LOB but required max VC for sequencing  -ES     Row Name 09/13/22 1617          Sensory Assessment (Somatosensory)    Sensory  Assessment (Somatosensory) bilateral LE;sensation intact  -ES     Bilateral LE Sensory Assessment general sensation;light touch awareness;light touch localization  -ES           User Key  (r) = Recorded By, (t) = Taken By, (c) = Cosigned By    Initials Name Provider Type    ES Svetlana Hicks, PT Physical Therapist               Goals/Plan     Row Name 09/13/22 1623          Bed Mobility Goal 1 (PT)    Activity/Assistive Device (Bed Mobility Goal 1, PT) bridging;sit to supine;supine to sit  -ES     [place] Level/Cues Needed (Bed Mobility Goal 1, PT) minimum assist (75% or more patient effort)  -ES     Time Frame (Bed Mobility Goal 1, PT) 2 weeks  -ES     Row Name 09/13/22 1623          Transfer Goal 1 (PT)    Activity/Assistive Device (Transfer Goal 1, PT) sit-to-stand/stand-to-sit  -ES     [place] Level/Cues Needed (Transfer Goal 1, PT) minimum assist (75% or more patient effort)  -ES     Time Frame (Transfer Goal 1, PT) 2 weeks  -ES     Row Name 09/13/22 1623          Gait Training Goal 1 (PT)    Activity/Assistive Device (Gait Training Goal 1, PT) gait (walking locomotion);walker, rolling  -ES     [place] Level (Gait Training Goal 1, PT) minimum assist (75% or more patient effort)  -ES     Time Frame (Gait Training Goal 1, PT) 2 weeks  -ES     Row Name 09/13/22 1623          Therapy Assessment/Plan (PT)    Planned Therapy Interventions (PT) balance training;bed mobility training;gait training;patient/family education;strengthening;transfer training  -ES           User Key  (r) = Recorded By, (t) = Taken By, (c) = Cosigned By    Initials Name Provider Type    Svetlana Hernandez, PT Physical Therapist               Clinical Impression     Row Name 09/13/22 1619          Pain    Pretreatment Pain Rating 0/10 - no pain  -ES     Posttreatment Pain Rating 0/10 - no pain  -ES     Pre/Posttreatment Pain Comment Denied pain throughout session  -ES     Pain Intervention(s) Repositioned;Ambulation/increased  activity  -ES     Row Name 09/13/22 1619          Plan of Care Review    Plan of Care Reviewed With patient  -ES     Progress no change  -ES     Outcome Evaluation PT eval completed. Due to pt's level of fatigue and impaired ability to follow commands, mobility limited to sitting EOB. Pt able to sit EOB with CGA and perform dynamic reaching activities with max VC. No dizziness/lightheaded with change in position. VSS throughout session. Pt reported fatigue after sitting EOB ~8 minutes. Due to impaired activity tolerance and decreased strength, pt will benefit from IPPT. PT rec IPF upon d/c at this time to return to PLOF.  -ES     Row Name 09/13/22 1619          Therapy Assessment/Plan (PT)    Rehab Potential (PT) good, to achieve stated therapy goals  -ES     Criteria for Skilled Interventions Met (PT) yes;meets criteria;skilled treatment is necessary  -ES     Therapy Frequency (PT) daily  -ES     Row Name 09/13/22 1619          Vital Signs    Pre Systolic BP Rehab 125  125  -ES     Pre Treatment Diastolic BP 62  -ES     Post Systolic BP Rehab 110  -ES     Post Treatment Diastolic BP 69  -ES     Pre SpO2 (%) 98  -ES     O2 Delivery Pre Treatment room air  -ES     Intra SpO2 (%) 96  -ES     O2 Delivery Intra Treatment room air  -ES     Post SpO2 (%) 97  -ES     O2 Delivery Post Treatment room air  -ES     Pre Patient Position Supine  -ES     Intra Patient Position Sitting  -ES     Post Patient Position Supine  -ES     Row Name 09/13/22 1619          Positioning and Restraints    Pre-Treatment Position in bed  -ES     Post Treatment Position bed  -ES     In Bed notified nsg;call light within reach;encouraged to call for assist;exit alarm on;side rails up x2;side rails up x3;heels elevated  -ES           User Key  (r) = Recorded By, (t) = Taken By, (c) = Cosigned By    Initials Name Provider Type    ES Svetlana Hicks, PT Physical Therapist               Outcome Measures     Row Name 09/13/22 1623 09/13/22 0800        How much help from another person do you currently need...    Turning from your back to your side while in flat bed without using bedrails? 3  -ES 3  -AL    Moving from lying on back to sitting on the side of a flat bed without bedrails? 2  -ES 3  -AL    Moving to and from a bed to a chair (including a wheelchair)? 2  -ES 3  -AL    Standing up from a chair using your arms (e.g., wheelchair, bedside chair)? 2  -ES 2  -AL    Climbing 3-5 steps with a railing? 2  -ES 2  -AL    To walk in hospital room? 2  -ES 2  -AL    AM-PAC 6 Clicks Score (PT) 13  -ES 15  -AL    Highest level of mobility 4 --> Transferred to chair/commode  -ES 4 --> Transferred to chair/commode  -AL    Row Name 09/13/22 1623          Functional Assessment    Outcome Measure Options AM-PAC 6 Clicks Basic Mobility (PT)  -ES           User Key  (r) = Recorded By, (t) = Taken By, (c) = Cosigned By    Initials Name Provider Type    AL Maite Roman, RN Registered Nurse    Svetlana Hernandez, PT Physical Therapist                             Physical Therapy Education                 Title: PT OT SLP Therapies (In Progress)     Topic: Physical Therapy (Done)     Point: Mobility training (Done)     Learning Progress Summary           Patient Acceptance, E,TB, VU by KISHA at 9/13/2022 1624                   Point: Home exercise program (Done)     Learning Progress Summary           Patient Acceptance, E,TB, VU by KISHA at 9/13/2022 1624                   Point: Body mechanics (Done)     Learning Progress Summary           Patient Acceptance, E,TB, VU by KISHA at 9/13/2022 1624                   Point: Precautions (Done)     Learning Progress Summary           Patient Acceptance, E,TB, VU by KISHA at 9/13/2022 1624                               User Key     Initials Effective Dates Name Provider Type Discipline     08/11/22 -  Svetlana Hicks, LAURIE Physical Therapist PT              PT Recommendation and Plan  Planned Therapy Interventions (PT): balance training, bed  mobility training, gait training, patient/family education, strengthening, transfer training  Plan of Care Reviewed With: patient  Progress: no change  Outcome Evaluation: PT eval completed. Due to pt's level of fatigue and impaired ability to follow commands, mobility limited to sitting EOB. Pt able to sit EOB with CGA and perform dynamic reaching activities with max VC. No dizziness/lightheaded with change in position. VSS throughout session. Pt reported fatigue after sitting EOB ~8 minutes. Due to impaired activity tolerance and decreased strength, pt will benefit from IPPT. PT rec IPF upon d/c at this time to return to PLOF.     Time Calculation:    PT Charges     Row Name 09/13/22 1625             Time Calculation    Start Time 1546  -ES      PT Received On 09/13/22  -ES      PT Goal Re-Cert Due Date 09/23/22  -ES              Untimed Charges    PT Eval/Re-eval Minutes 24  -ES              Total Minutes    Untimed Charges Total Minutes 24  -ES       Total Minutes 24  -ES            User Key  (r) = Recorded By, (t) = Taken By, (c) = Cosigned By    Initials Name Provider Type    ES Svetlana Hicks PT Physical Therapist              Therapy Charges for Today     Code Description Service Date Service Provider Modifiers Qty    [id number] HC PT EVAL MOD COMPLEXITY 2 9/13/2022 Svetlana Hicks PT GP 1          PT G-Codes  Outcome Measure Options: AM-PAC 6 Clicks Basic Mobility (PT)  AM-PAC 6 Clicks Score (PT): 13  AM-PAC 6 Clicks Score (OT): 15    Svetlana Hicks PT  9/13/2022

## 2022-09-13 NOTE — CASE MANAGEMENT/SOCIAL WORK
Continued Stay Note  [facility]     Patient Name: Nehal Parker  MRN: [id number]  Today's Date: 9/13/2022    Admit Date: 9/11/2022     Discharge Plan     Row Name 09/13/22 1420       Plan    Plan Comments I met with Ms. Parker at the bedside to discuss her discharge plan. OT recommends that she go to a skilled nursing facility after this hospitalization. Ms. Parker verbalizes understanding; however, she prefers to return to her assisted living facility. PT's evaluation is still pending. Once completed, case management will discuss discharge with the  at [facility] to see if they are able to accept her back. Case management did provide a map of skilled facilities in [facility] for her to review if [facility] recommends rehab as well.               Discharge Codes    No documentation.                     Brynn Huizar RN

## 2022-09-13 NOTE — PROGRESS NOTES
Stroke Progress Note       Chief Complaint:  Left sided weakness    Subjective    Subjective     Subjective:  No verse events reported overnight.  Patient is lethargic this morning and difficult to arouse.  She is able to respond to questions but drifts off during our conversation.  Requires encouragement to follow commands.  Continues to have left-sided weakness and dysarthria.    Review of Systems   Unable to perform ROS: Mental status change            Objective    Objective      Temp:  [97.7 °F (36.5 °C)-98.5 °F (36.9 °C)] 97.7 °F (36.5 °C)  Heart Rate:  [59-75] 63  Resp:  [18] 18  BP: (104-194)/() 117/65        Neurological Exam  Mental Status  Arousable to verbal stimuli. Oriented only to person, place and time. Oriented to person, place, and time. Moderate dysarthria present. Mixed aphasia present. Able to name objects.    Cranial Nerves  CN II: Visual fields full to confrontation.  CN III, IV, VI: Extraocular movements intact bilaterally. Normal lids and orbits bilaterally. Pupils equal round and reactive to light bilaterally.  CN V: Facial sensation is normal.  CN VII:  Right: There is no facial weakness.  Left: There is central facial weakness.  CN XI: Shoulder shrug strength is normal.  CN XII: Tongue midline without atrophy or fasciculations.    Motor  Normal muscle bulk throughout. No fasciculations present. Normal muscle tone. Strength is 5/5 in all four extremities except as noted.  Left upper extremity slight drift, strength is 4/5.  Left lower extremity 3/5.    Sensory  Light touch is normal in upper and lower extremities.     Reflexes                                            Right                      Left  Plantar                           Downgoing                Downgoing    Coordination    Not tested secondary to mentation.    Gait    Not tested.      Physical Exam  Vitals and nursing note reviewed.   Constitutional:       Appearance: Normal appearance.   HENT:      Head: Normocephalic  and atraumatic.   Eyes:      General: Lids are normal.      Extraocular Movements: Extraocular movements intact.      Pupils: Pupils are equal, round, and reactive to light.   Cardiovascular:      Rate and Rhythm: Normal rate and regular rhythm.   Pulmonary:      Effort: Pulmonary effort is normal. No respiratory distress.   Musculoskeletal:      Cervical back: Normal range of motion and neck supple.      Right lower leg: No edema.      Left lower leg: No edema.   Skin:     General: Skin is warm and dry.   Neurological:      Mental Status: She is oriented to person, place, and time. She is lethargic.      Cranial Nerves: Cranial nerve deficit and dysarthria present.      Motor: Weakness present.   Psychiatric:         Mood and Affect: Mood normal.         Behavior: Behavior normal.         Results Review:    I reviewed the patient's new clinical results.  WBC   Date Value Ref Range Status   09/13/2022 12.15 (H) 3.40 - 10.80 10*3/mm3 Final     RBC   Date Value Ref Range Status   09/13/2022 4.81 3.77 - 5.28 10*6/mm3 Final     Hemoglobin   Date Value Ref Range Status   09/13/2022 13.8 12.0 - 15.9 g/dL Final     Hematocrit   Date Value Ref Range Status   09/13/2022 42.5 34.0 - 46.6 % Final     MCV   Date Value Ref Range Status   09/13/2022 88.4 79.0 - 97.0 fL Final     MCH   Date Value Ref Range Status   09/13/2022 28.7 26.6 - 33.0 pg Final     MCHC   Date Value Ref Range Status   09/13/2022 32.5 31.5 - 35.7 g/dL Final     RDW   Date Value Ref Range Status   09/13/2022 13.9 12.3 - 15.4 % Final     RDW-SD   Date Value Ref Range Status   09/13/2022 45.3 37.0 - 54.0 fl Final     MPV   Date Value Ref Range Status   09/13/2022 10.3 6.0 - 12.0 fL Final     Platelets   Date Value Ref Range Status   09/13/2022 300 140 - 450 10*3/mm3 Final     Neutrophil %   Date Value Ref Range Status   09/13/2022 84.6 (H) 42.7 - 76.0 % Final     Lymphocyte %   Date Value Ref Range Status   09/13/2022 7.3 (L) 19.6 - 45.3 % Final     Monocyte %    Date Value Ref Range Status   09/13/2022 5.8 5.0 - 12.0 % Final     Eosinophil %   Date Value Ref Range Status   09/13/2022 1.6 0.3 - 6.2 % Final     Basophil %   Date Value Ref Range Status   09/13/2022 0.4 0.0 - 1.5 % Final     Immature Grans %   Date Value Ref Range Status   09/13/2022 0.3 0.0 - 0.5 % Final     Neutrophils, Absolute   Date Value Ref Range Status   09/13/2022 10.28 (H) 1.70 - 7.00 10*3/mm3 Final     Lymphocytes, Absolute   Date Value Ref Range Status   09/13/2022 0.89 0.70 - 3.10 10*3/mm3 Final     Monocytes, Absolute   Date Value Ref Range Status   09/13/2022 0.70 0.10 - 0.90 10*3/mm3 Final     Eosinophils, Absolute   Date Value Ref Range Status   09/13/2022 0.19 0.00 - 0.40 10*3/mm3 Final     Basophils, Absolute   Date Value Ref Range Status   09/13/2022 0.05 0.00 - 0.20 10*3/mm3 Final     Immature Grans, Absolute   Date Value Ref Range Status   09/13/2022 0.04 0.00 - 0.05 10*3/mm3 Final     nRBC   Date Value Ref Range Status   09/13/2022 0.0 0.0 - 0.2 /100 WBC Final     Lab Results   Component Value Date    GLUCOSE 124 (H) 09/13/2022    BUN 12 09/13/2022    CREATININE 0.57 09/13/2022    BCR 21.1 09/13/2022    K 4.1 09/13/2022    CO2 20.0 (L) 09/13/2022    CALCIUM 9.9 (H) 09/13/2022    ALBUMIN 4.20 09/12/2022    AST 28 09/12/2022    ALT 10 09/12/2022     Hgb A1c 5.9  Lipid panel:  , HDL 60, , triglycerides 103      CT Head Without Contrast    Result Date: 9/13/2022   1. Right frontal/anterior temporal lobe cortical/subcortical acute/subacute infarct, corresponding to yesterday's MRI brain. The distribution of infarct is not thought to be significantly changed, and there is no indication of hemorrhagic transformation. 2. Atrophy. 3. Mild scattered chronic microvascular disease.  This report was finalized on 9/13/2022 1:06 PM by Debbie Otto MD.      MRI Brain Without Contrast    Result Date: 9/12/2022  Acute infarct in the right frontotemporal region as above.  Findings discussed  with stroke navigator by Dr. Cedrick Haq via telephone at 3:10 PM 9/12/2022.  This report was finalized on 9/12/2022 3:11 PM by Cedrick Haq MD.        Results for orders placed during the hospital encounter of 09/11/22    Adult Transthoracic Echo Complete W/ Cont if Necessary Per Protocol (With Agitated Saline)    Interpretation Summary  · Left ventricular wall thickness is consistent with mild concentric hypertrophy.  · Left ventricular ejection fraction appears to be 56 - 60%.  · Left atrial volume is mildly increased.  · There is calcification of the aortic valve.  · Mild dilation of the aortic root is present. Mild dilation of the sinuses of Valsalva is present. Mild dilation of the ascending aorta is present.  · MAC and calcification of subvalvular structures but no significant MS  · Mild MR            Assessment/Plan     Assessment/Plan: 89-year-old, , right-handed female with known diagnosis of TIA, PE, HTN, HLD, depression, and hypothyroidism who presented with left facial droop, left-sided weakness and slurred speech.  Initial NIHSS 6.  CT head negative for acute abnormality.  CT perfusion revealed perfusion deficit in the right MCA territory (27 mL of ischemic penumbra with 12 mL of corresponding core infarct.  CTA head and neck revealed multiple areas of high-grade stenosis and occlusion in the distal M2 segment of the right MCA as well as multiple areas of high-grade narrowing in the bilateral PCAs.  Not a candidate for IV thrombolytics secondary to last known well > 4.5 hours.  Not a candidate for endovascular therapy due to multiple comorbidities with baseline modified Tucker score of 3.    PTA antiplatelet: None  PTA anticoagulant: None      1. Acute right MCA stroke secondary to distal M2 occlusion (superior division)  -MRI reveals acute infarct in the right frontotemporal region; there is also asymptomatic microhemorrhage noted on the SWI series.  -Etiology likely cardioembolic;  questionable history of A. fib.  -Patient was on Xarelto in the past which was discontinued several months ago due to falls  -Repeat CT head this morning due to lethargy demonstrates right frontal temporal acute to subacute infarct which corresponds to the MRI findings and is not significantly changed.  -Echo shows an EF of 60%, mild left atrial dilation no bubble study due to age protocol  -Recommend holding off on starting anticoagulation for at least the next 2 weeks given size of infarct and microhemorrhage noted on MRI.  -EEG is pending  -Continue aspirin 81 mg daily  -Continue PT/OT; PT recommending IRF at discharge (patient is a resident of [facility])  -CM following for discharge needs  -Stroke neurology will continue to follow    2.  HTN  -Normal blood pressure parameters    3.  HLD  Lipid panel:  , HDL 60, , triglycerides 103  -Goal LDL <70  -Continue atorvastatin 40 mg daily    4.  Depression  -Continue sertraline    Plan of care was discussed with patient, nursing, and Dr. Suarez.  I attempted to call patient's daughter this afternoon and left a message.  Stroke neurology will continue to follow.    ZEYNEP Mena  09/13/22  17:04 EDT

## 2022-09-13 NOTE — PROGRESS NOTES
[facility] Medicine Services  PROGRESS NOTE    Patient Name: Nehal Parker  : 2/15/1926  MRN: [id number]    Date of Admission: 2022  Primary Care Physician: Brenton Clay MD    Subjective     CC: f/u stroke     HPI:  Sitting up in bed. Daughter at bedside. She is drowsy this morning but has no specific complaints. Daughter feels like patient's strength and speech have improved since admission. Daughter reports patient generally stays up late and sleeps until late morning or early afternoon.     RN paged this afternoon due to concerns that urine appears darker today, possibly blood-tinged     ROS:  Gen- No fevers, chills  CV- No chest pain, palpitations  Resp- No cough, dyspnea  GI- No N/V/D, abd pain    Objective     Vital Signs:   Temp:  [97.7 °F (36.5 °C)-98.5 °F (36.9 °C)] 97.8 °F (36.6 °C)  Heart Rate:  [59-75] 60  Resp:  [16-18] 18  BP: (104-194)/() 164/79     Physical Exam:  Constitutional: No acute distress. Drowsy but woke and conversed   HENT: NCAT, mucous membranes moist  Respiratory: Clear to auscultation bilaterally, respiratory effort normal on rooma ir   Cardiovascular: RRR, no murmurs, rubs, or gallops  Gastrointestinal: Positive bowel sounds, soft, nontender, nondistended  Musculoskeletal: No bilateral ankle edema. Mild, non-pitting upper extremity edema   Psychiatric: Appropriate affect, cooperative  Neurologic: Oriented x 3, moves all extremities spontaneously without focal deficits, mild speech dysarthria    Results Reviewed:  LAB RESULTS:      Lab 22  0444 22  0756 22  1349 22  1344   WBC 12.15* 7.94 8.33  --    HEMOGLOBIN 13.8 13.0 13.2  --    HEMOGLOBIN, POC  --   --   --  14.3   HEMATOCRIT 42.5 39.5 40.4  --    HEMATOCRIT POC  --   --   --  42   PLATELETS 300 265 282  --    NEUTROS ABS 10.28*  --  6.46  --    IMMATURE GRANS (ABS) 0.04  --  0.02  --    LYMPHS ABS 0.89  --  1.20  --    MONOS ABS 0.70  --  0.44  --    EOS ABS  0.19  --  0.15  --    MCV 88.4 86.4 87.4  --    PROTIME  --   --   --  15.0   APTT  --   --  31.4  --          Lab 09/13/22  0444 09/12/22  0756 09/11/22  1344   SODIUM 135* 136  --    POTASSIUM 4.1 4.1  --    CHLORIDE 101 99  --    CO2 20.0* 25.0  --    ANION GAP 14.0 12.0  --    BUN 12 12  --    CREATININE 0.57 0.56* 0.60   EGFR 83.3 83.6 82.3   GLUCOSE 124* 95  --    CALCIUM 9.9* 10.0*  --    HEMOGLOBIN A1C  --  5.90*  --    TSH  --  1.570  --          Lab 09/12/22  0756 09/11/22  1349   TOTAL PROTEIN 7.0  --    ALBUMIN 4.20  --    GLOBULIN 2.8  --    ALT (SGPT) 10 11   AST (SGOT) 28 23   BILIRUBIN 0.5  --    ALK PHOS 97  --          Lab 09/11/22  1349 09/11/22  1344   TROPONIN T <0.010  --    PROTIME  --  15.0   INR  --  1.3*         Lab 09/12/22  0756   CHOLESTEROL 205*   LDL CHOL 127*   HDL CHOL 60   TRIGLYCERIDES 103     Brief Urine Lab Results  (Last result in the past 365 days)      Color   Clarity   Blood   Leuk Est   Nitrite   Protein   CREAT   Urine HCG        09/11/22 1418 Yellow   Clear   Negative   Trace   Negative   Negative               Microbiology Results Abnormal     None        Adult Transthoracic Echo Complete W/ Cont if Necessary Per Protocol (With Agitated Saline)    Result Date: 9/12/2022  · Left ventricular wall thickness is consistent with mild concentric hypertrophy. · Left ventricular ejection fraction appears to be 56 - 60%. · Left atrial volume is mildly increased. · There is calcification of the aortic valve. · Mild dilation of the aortic root is present. Mild dilation of the sinuses of Valsalva is present. Mild dilation of the ascending aorta is present. · MAC and calcification of subvalvular structures but no significant MS · Mild MR      CT Head Without Contrast    Result Date: 9/13/2022  CT HEAD WO CONTRAST-  Date of Exam: 9/13/2022 12:51 PM  Indication: Stroke, follow up; I63.511-Cerebral infarction due to unspecified occlusion or stenosis of right middle cerebral artery;  I10-Essential (primary) hypertension; Z66-Do not resuscitate.  Comparison: MRI brain without and with contrast 09/12/2022.  Technique:  Without contrast, contiguous axial CT images of the head were obtained from skull base to vertex.  Coronal and sagittal reconstructions were performed.  Automated exposure control and iterative reconstruction methods were used.  FINDINGS Hypodensity is seen within the cortical-subcortical right frontal lobe and anterior right temporal lobe consistent with the recent infarct documented on yesterday's MRI brain. There is no indication of hemorrhagic transformation. No additional infarct is seen.  There is mild generalized atrophy with compensatory prominence of the ventricles and extra-axial spaces. Mild scattered deep white matter hypodensities are favored to reflect changes of chronic microvascular disease. There is a chronic lacunar infarct within the right basal ganglia.  No acute calvarial abnormality. Minor ethmoid sinus mucosal thickening. Mastoid air cells are clear. Mild intracranial carotid artery calcifications.      Impression:  1. Right frontal/anterior temporal lobe cortical/subcortical acute/subacute infarct, corresponding to yesterday's MRI brain. The distribution of infarct is not thought to be significantly changed, and there is no indication of hemorrhagic transformation. 2. Atrophy. 3. Mild scattered chronic microvascular disease.  This report was finalized on 9/13/2022 1:06 PM by Debbie Otto MD.      MRI Brain Without Contrast    Result Date: 9/12/2022  DATE OF EXAM: 9/12/2022 1:00 PM  PROCEDURE: MRI BRAIN WO CONTRAST-  INDICATIONS: Stroke, follow up; I63.511-Cerebral infarction due to unspecified occlusion or stenosis of right middle cerebral artery; I10-Essential (primary) hypertension; Z66-Do not resuscitate  COMPARISON: Same-day CT cerebral perfusion, CTA head and neck, CT head  TECHNIQUE: Multiplanar multisequence images of the brain were performed without  contrast according to routine brain MRI protocol.  FINDINGS: There is an acute infarct in the right frontotemporal lobe with involvement of the insular cortex (series 3 image [id number]). There is corresponding T2/FLAIR hyperintense signal change within this region, with mild cerebral swelling. No acute intracranial hemorrhage/hemorrhagic transformation. There are a few punctate foci of GRE susceptibility within the region of infarct likely reflecting microhemorrhage/early cortical laminar necrosis. There is no midline shift or herniation. Elsewhere there are scattered subcortical and periventricular white matter FLAIR/T2 hyperintensities which are nonspecific and can be seen in the setting of chronic small vessel ischemic change. There is mild global cerebral volume loss. No extra-axial collections. Normal size and configuration of the ventricles. The cerebellopontine angles and midline structures are normal. The major intracranial vascular flow voids are preserved and appear grossly unremarkable. The paranasal sinuses and mastoid air cells are clear. No acute or suspicious bony findings. Normal appearance of the orbits.      Impression: Acute infarct in the right frontotemporal region as above.  Findings discussed with stroke navigator by Dr. Cedrick Haq via telephone at 3:10 PM 9/12/2022.  This report was finalized on 9/12/2022 3:11 PM by Cedrick Haq MD.      XR Chest 1 View    Result Date: 9/11/2022  Examination: XR CHEST 1 VW-  Date of Exam: 9/11/2022 3:28 PM  Indication: Acute Stroke Protocol (onset < 12 hrs); I63.511-Cerebral infarction due to unspecified occlusion or stenosis of right middle cerebral artery; I10-Essential (primary) hypertension; Z66-Do not resuscitate.  Comparison: Correlation with CTA neck performed earlier today.  Technique: Single radiographic view of the chest was obtained.  Findings: There is no pneumothorax, pleural effusion or focal airspace consolidation.. There is left basilar  opacity, likely atelectasis. There is diffuse interstitial prominence in the lungs. Cardiac silhouette appears enlarged. Pulmonary vasculature appears within normal limits. There is an old healed proximal left humerus fracture. There is DJD of both shoulders. There are moderate spinal degenerative changes.      Impression: Probable cardiomegaly with chronic changes in the lungs. No definite acute disease.  This report was finalized on 9/11/2022 3:59 PM by Rom Javed MD.      Results for orders placed during the hospital encounter of 09/11/22    Adult Transthoracic Echo Complete W/ Cont if Necessary Per Protocol (With Agitated Saline)    Interpretation Summary  · Left ventricular wall thickness is consistent with mild concentric hypertrophy.  · Left ventricular ejection fraction appears to be 56 - 60%.  · Left atrial volume is mildly increased.  · There is calcification of the aortic valve.  · Mild dilation of the aortic root is present. Mild dilation of the sinuses of Valsalva is present. Mild dilation of the ascending aorta is present.  · MAC and calcification of subvalvular structures but no significant MS  · Mild MR    I have reviewed the medications:  Scheduled Meds:aspirin, 81 mg, Oral, Daily  atorvastatin, 40 mg, Oral, Nightly  levothyroxine, 112 mcg, Oral, Q AM  [START ON 9/14/2022] losartan, 25 mg, Oral, Q24H  pantoprazole, 40 mg, Oral, QAM  sertraline, 25 mg, Oral, Daily  sodium chloride, 10 mL, Intravenous, Q12H  sodium chloride, 10 mL, Intravenous, Q12H      Continuous Infusions:   PRN Meds:.•  acetaminophen **OR** [DISCONTINUED] acetaminophen **OR** [DISCONTINUED] acetaminophen  •  hydrALAZINE  •  ondansetron **OR** ondansetron  •  sodium chloride  •  sodium chloride  •  sodium chloride    Assessment & Plan     Active Hospital Problems    Diagnosis  POA   • Acute left-sided weakness [R53.1]  Yes   • Acute CVA (cerebrovascular accident) (HCC) [I63.9]  Yes   • HLD (hyperlipidemia) [E78.5]  Yes   •  Essential hypertension [I10]  Yes   • Hypothyroidism (acquired) [E03.9]  Yes   • Mild depression [F32.A]  Yes      Resolved Hospital Problems   No resolved problems to display.        Brief Hospital Course to date:  Nehal Parker is a 89 y.o. female with past medical history of essential hypertension, dyslipidemia, hypothyroidism, depression, TIA, history of PE who was recently taken off Xarelto (that she was taking for recurrent PE) who presented to the hospital with left-sided weakness, left facial droop and slurred speech.  She was found to have acute stroke but was not candidate for tPA (outside the window) no revascularization because of her age     Acute right MCA ischemic stroke  - Appreciate stroke neurology assistance  - Not candidate for tPA (outside the window) / not candidate for intervention/revascularization (age)  - Symptoms (slurred speech, L facial droop and left-sided weakness) are improving  - CTA head/neck showed multiple areas of high-grade narrowing or occlusion within the distal M2 branches of the right MCA. Multiple focal high-grade stenoses in both posterior cerebral arteries  - CT cerebral perfusion: 27 mL brain risk in the right MCA distribution with 12 mL central core infarct  - MRI brain confirmed acute right frontotemporal region infarct  - Continue ASA / high intensity statin  - PT/ OT following. OT recommends rehab. Patient prefers to return to her assisted living facility. PT eval pending   - SLP following - on regular diet    Hypertensive emergency, resolved  - BP improved  - Will increase Losartan from 25mg to 50mg daily    Foul-appearing urine  - UA concerning for UTI with gross hematuria also noted  - Start IV Aztreonam (PCN allergic) and check CT stone protocol    Hypothyroidism  - Continue Levothyroxine     Dyslipidemia  -   - On Lipitor 40mg daily given her age     Depression  - Continue Sertraline      History of recurrent PE  - Recently taken off Xarelto (unclear  reason)  - Holding off on anticoagulation given the size of her acute stroke    Expected Discharge Location and Transportation: [facility] vs rehab  Expected Discharge Date: 9/15/22     DVT prophylaxis:  Mechanical DVT prophylaxis orders are present.     AM-PAC 6 Clicks Score (PT): 15 (09/13/22 0800)    CODE STATUS:   Code Status and Medical Interventions:   Ordered at: 09/11/22 1514     Level Of Support Discussed With:    Patient     Code Status (Patient has no pulse and is not breathing):    No CPR (Do Not Attempt to Resuscitate)     Medical Interventions (Patient has pulse or is breathing):    Full Support     Belkys Hanson PA-C  09/13/22

## 2022-09-14 LAB
ANION GAP SERPL CALCULATED.3IONS-SCNC: 10 MMOL/L (ref 5–15)
BASOPHILS # BLD AUTO: 0.05 10*3/MM3 (ref 0–0.2)
BASOPHILS NFR BLD AUTO: 0.5 % (ref 0–1.5)
BUN SERPL-MCNC: 15 MG/DL (ref 8–23)
BUN/CREAT SERPL: 23.8 (ref 7–25)
CALCIUM SPEC-SCNC: 9.4 MG/DL (ref 8.2–9.6)
CHLORIDE SERPL-SCNC: 103 MMOL/L (ref 98–107)
CO2 SERPL-SCNC: 23 MMOL/L (ref 22–29)
CREAT SERPL-MCNC: 0.63 MG/DL (ref 0.57–1)
D-LACTATE SERPL-SCNC: 1.7 MMOL/L (ref 0.5–2)
DEPRECATED RDW RBC AUTO: 46.5 FL (ref 37–54)
EGFRCR SERPLBLD CKD-EPI 2021: 81.3 ML/MIN/1.73
EOSINOPHIL # BLD AUTO: 0.3 10*3/MM3 (ref 0–0.4)
EOSINOPHIL NFR BLD AUTO: 2.8 % (ref 0.3–6.2)
ERYTHROCYTE [DISTWIDTH] IN BLOOD BY AUTOMATED COUNT: 14.2 % (ref 12.3–15.4)
GLUCOSE SERPL-MCNC: 99 MG/DL (ref 65–99)
HCT VFR BLD AUTO: 38.9 % (ref 34–46.6)
HGB BLD-MCNC: 12.4 G/DL (ref 12–15.9)
IMM GRANULOCYTES # BLD AUTO: 0.03 10*3/MM3 (ref 0–0.05)
IMM GRANULOCYTES NFR BLD AUTO: 0.3 % (ref 0–0.5)
LYMPHOCYTES # BLD AUTO: 0.99 10*3/MM3 (ref 0.7–3.1)
LYMPHOCYTES NFR BLD AUTO: 9.4 % (ref 19.6–45.3)
MCH RBC QN AUTO: 28.4 PG (ref 26.6–33)
MCHC RBC AUTO-ENTMCNC: 31.9 G/DL (ref 31.5–35.7)
MCV RBC AUTO: 89.2 FL (ref 79–97)
MONOCYTES # BLD AUTO: 0.78 10*3/MM3 (ref 0.1–0.9)
MONOCYTES NFR BLD AUTO: 7.4 % (ref 5–12)
NEUTROPHILS NFR BLD AUTO: 79.6 % (ref 42.7–76)
NEUTROPHILS NFR BLD AUTO: 8.43 10*3/MM3 (ref 1.7–7)
NRBC BLD AUTO-RTO: 0 /100 WBC (ref 0–0.2)
PLATELET # BLD AUTO: 239 10*3/MM3 (ref 140–450)
PMV BLD AUTO: 10.2 FL (ref 6–12)
POTASSIUM SERPL-SCNC: 4.4 MMOL/L (ref 3.5–5.2)
RBC # BLD AUTO: 4.36 10*6/MM3 (ref 3.77–5.28)
SODIUM SERPL-SCNC: 136 MMOL/L (ref 136–145)
WBC NRBC COR # BLD: 10.58 10*3/MM3 (ref 3.4–10.8)

## 2022-09-14 PROCEDURE — 99232 SBSQ HOSP IP/OBS MODERATE 35: CPT | Performed by: NURSE PRACTITIONER

## 2022-09-14 PROCEDURE — 97530 THERAPEUTIC ACTIVITIES: CPT

## 2022-09-14 PROCEDURE — 92507 TX SP LANG VOICE COMM INDIV: CPT

## 2022-09-14 PROCEDURE — 97535 SELF CARE MNGMENT TRAINING: CPT

## 2022-09-14 PROCEDURE — 85025 COMPLETE CBC W/AUTO DIFF WBC: CPT | Performed by: PHYSICIAN ASSISTANT

## 2022-09-14 PROCEDURE — 83605 ASSAY OF LACTIC ACID: CPT | Performed by: PHYSICIAN ASSISTANT

## 2022-09-14 PROCEDURE — 97110 THERAPEUTIC EXERCISES: CPT

## 2022-09-14 PROCEDURE — 80048 BASIC METABOLIC PNL TOTAL CA: CPT | Performed by: PHYSICIAN ASSISTANT

## 2022-09-14 PROCEDURE — 99232 SBSQ HOSP IP/OBS MODERATE 35: CPT | Performed by: PHYSICIAN ASSISTANT

## 2022-09-14 PROCEDURE — 97116 GAIT TRAINING THERAPY: CPT

## 2022-09-14 PROCEDURE — 92526 ORAL FUNCTION THERAPY: CPT

## 2022-09-14 RX ORDER — TERAZOSIN 1 MG/1
1 CAPSULE ORAL NIGHTLY
Status: DISCONTINUED | OUTPATIENT
Start: 2022-09-14 | End: 2022-09-16 | Stop reason: HOSPADM

## 2022-09-14 RX ADMIN — ASPIRIN 81 MG CHEWABLE TABLET 81 MG: 81 TABLET CHEWABLE at 09:29

## 2022-09-14 RX ADMIN — Medication 10 ML: at 20:19

## 2022-09-14 RX ADMIN — AZTREONAM 1 G: 1 INJECTION, POWDER, LYOPHILIZED, FOR SOLUTION INTRAMUSCULAR; INTRAVENOUS at 12:01

## 2022-09-14 RX ADMIN — LOSARTAN POTASSIUM 50 MG: 50 TABLET, FILM COATED ORAL at 09:30

## 2022-09-14 RX ADMIN — PANTOPRAZOLE SODIUM 40 MG: 40 TABLET, DELAYED RELEASE ORAL at 09:29

## 2022-09-14 RX ADMIN — ATORVASTATIN CALCIUM 40 MG: 40 TABLET, FILM COATED ORAL at 20:19

## 2022-09-14 RX ADMIN — LEVOTHYROXINE SODIUM 112 MCG: 0.11 TABLET ORAL at 05:53

## 2022-09-14 RX ADMIN — AZTREONAM 1 G: 1 INJECTION, POWDER, LYOPHILIZED, FOR SOLUTION INTRAMUSCULAR; INTRAVENOUS at 20:19

## 2022-09-14 RX ADMIN — AZTREONAM 1 G: 1 INJECTION, POWDER, LYOPHILIZED, FOR SOLUTION INTRAMUSCULAR; INTRAVENOUS at 03:40

## 2022-09-14 RX ADMIN — SERTRALINE HYDROCHLORIDE 25 MG: 25 TABLET ORAL at 09:30

## 2022-09-14 RX ADMIN — TERAZOSIN HYDROCHLORIDE 1 MG: 1 CAPSULE ORAL at 20:19

## 2022-09-14 NOTE — THERAPY TREATMENT NOTE
Acute Care - Speech Language Pathology   Swallow Treatment Note [facility]     Patient Name: Nehal Parker  : 2/15/1926  MRN: [id number]  Today's Date: 2022               Admit Date: 2022    Visit Dx:     ICD-10-CM ICD-9-CM   1. Acute ischemic right MCA stroke (HCC)  I63.511 434.91   2. Elevated blood pressure reading with diagnosis of hypertension  I10 401.9   3. DNR (do not resuscitate)  Z66 V49.86     Patient Active Problem List   Diagnosis   • Acute left-sided weakness   • Acute CVA (cerebrovascular accident) (HCC)   • HLD (hyperlipidemia)   • Essential hypertension   • Hypothyroidism (acquired)   • Mild depression     Past Medical History:   Diagnosis Date   • Depression    • Disease of thyroid gland    • Hypertension      History reviewed. No pertinent surgical history.    SLP Recommendation and Plan     SLP Diet Recommendation: regular textures, thin liquids (22 134)  Recommended Precautions and Strategies: upright posture during/after eating, small bites of food and sips of liquid, general aspiration precautions (22)  SLP Rec. for Method of Medication Administration: meds whole, with thin liquids, meds crushed, with pudding or applesauce, as tolerated (22 134)     Monitor for Signs of Aspiration: yes, notify SLP if any concerns (22)  Recommended Diagnostics: No further SLP services recommended (22)     Anticipated Discharge Disposition (SLP): unknown, anticipate therapy at next level of care (22 134)     Therapy Frequency (Swallow): evaluation only (22)        Daily Summary of Progress (SLP): progress toward functional goals as expected (22 134)               Treatment Assessment (SLP): Pt tolerated thin liquid and regular solid trials w/o overt s/s of aspiration. Pt reports no difficulty w/ lunch tray, tray at bedisde w/ majority eaten. SLP will cont to f/u for speech tx and sign off for dysphagia. Please reconsult if  futher concerns arise (09/14/22 1340)  Plan for Continued Treatment (SLP): treatment no longer indicated as all goals met (09/14/22 1340)                SWALLOW EVALUATION (last 72 hours)     SLP Adult Swallow Evaluation     Row Name 09/14/22 1340 09/13/22 1345 09/12/22 0935             Rehab Evaluation    Document Type therapy note (daily note)  - therapy note (daily note)  - --      Subjective Information no complaints  - no complaints  - --      Patient Observations alert;cooperative  - alert;cooperative;agree to therapy  - --      Patient/Family/Caregiver Comments/Observations No family present  - none  - --      Patient Effort good  - good  - --      Symptoms Noted During/After Treatment none  [facility] -- --              General Information    Patient Profile Reviewed -- yes  - --      Pertinent History Of Current Problem -- See initial eval  - --      Current Method of Nutrition -- regular textures;thin liquids  - NPO  -      Prior Level of Function-Communication -- -- WFL  -      Prior Level of Function-Swallowing -- -- no diet consistency restrictions  -      Patient's Goals for Discharge -- -- patient did not state  -              Pain    Additional Documentation Pain Scale: FACES Pre/Post-Treatment (Group)  - Pain Scale: FACES Pre/Post-Treatment (Group)  - --              Pain Scale: FACES Pre/Post-Treatment    Pain: FACES Scale, Pretreatment 0-->no hurt  - 0-->no hurt  - --      Posttreatment Pain Rating 0-->no hurt  - 0-->no hurt  -KL --              Oral Motor Structure and Function    Secretion Management -- -- WNL/WFL  -      Mucosal Quality -- -- moist, healthy  -              General Eating/Swallowing Observations    Respiratory Support Currently in Use -- -- room air  -      Eating/Swallowing Skills -- -- self-fed;fed by SLP;appropriate self-feeding skills observed  -      Positioning During Eating -- -- upright 90 degree;upright in bed  -       Utensils Used -- -- spoon;cup;straw  -      Consistencies Trialed -- -- thin liquids;pureed;regular textures  -              Clinical Swallow Eval    Pharyngeal Phase -- -- no overt signs/symptoms of pharyngeal impairment  -      Clinical Swallow Evaluation Summary -- -- No s/sxs aspiration/distress observed, even when pushed w/ multiple large/consecutive sips of thin liquid. Given location of possible stroke, will f/u for assessment of diet tolerance prior to signing off.  -              SLP Evaluation Clinical Impression    SLP Swallowing Diagnosis -- R/O pharyngeal dysphagia  - R/O pharyngeal dysphagia  -      Functional Impact -- risk of aspiration/pneumonia  - risk of aspiration/pneumonia  -      Rehab Potential/Prognosis, Swallowing -- good, to achieve stated therapy goals  -KL good, to achieve stated therapy goals  -      Swallow Criteria for Skilled Therapeutic Interventions Met -- demonstrates skilled criteria  -KL demonstrates skilled criteria  -              SLP Treatment Clinical Impressions    Treatment Assessment (SLP) Pt tolerated thin liquid and regular solid trials w/o overt s/s of aspiration. Pt reports no difficulty w/ lunch tray, tray at bedisde w/ majority eaten. SLP will cont to f/u for speech tx and sign off for dysphagia. Please reconsult if futher concerns arise  - Pt accepted trials of thin liquid via straw only. Pt declined puree and solid trials. Pt and RN report no swallowing difficulty during meals today. Pt with no overt clinical s/sx of aspiration with thin liquid. ST will f/u to monitor tolerance with solids.  -KL --      Daily Summary of Progress (SLP) progress toward functional goals as expected  - progress toward functional goals as expected  - --      Plan for Continued Treatment (SLP) treatment no longer indicated as all goals met  - continue treatment per plan of care  - --      Care Plan Review evaluation/treatment results reviewed  -  evaluation/treatment results reviewed;care plan/treatment goals reviewed;risks/benefits reviewed;current/potential barriers reviewed;patient/other agree to care plan  - --              Recommendations    Therapy Frequency (Swallow) evaluation only  - PRN  - PRN  -      Predicted Duration Therapy Intervention (Days) -- until discharge  - --      SLP Diet Recommendation regular textures;thin liquids  - regular textures;thin liquids  - regular textures;thin liquids  -      Recommended Diagnostics No further SLP services recommended  - -- --      Recommended Precautions and Strategies upright posture during/after eating;small bites of food and sips of liquid;general aspiration precautions  - upright posture during/after eating;small bites of food and sips of liquid;general aspiration precautions  - upright posture during/after eating;small bites of food and sips of liquid;general aspiration precautions  -      Oral Care Recommendations Oral Care BID/PRN  - Oral Care BID/PRN  -KL Oral Care BID/PRN  -MP      SLP Rec. for Method of Medication Administration meds whole;with thin liquids;meds crushed;with pudding or applesauce;as tolerated  - as tolerated  - as tolerated  -      Monitor for Signs of Aspiration yes;notify SLP if any concerns  - yes;notify SLP if any concerns  - yes;notify SLP if any concerns  -      Anticipated Discharge Disposition (SLP) unknown;anticipate therapy at next level of care  - unknown;anticipate therapy at next level of care  - --            User Key  (r) = Recorded By, (t) = Taken By, (c) = Cosigned By    Initials Name Effective Dates     Estrada Lindsey MS CCC-SLP 12/28/21 -     Delia Ramirez MS CCC-SLP 06/15/21 -     Denise Shannon MS, CF-SLP 06/22/22 -                 EDUCATION  The patient has been educated in the following areas:   Communication Impairment Dysphagia (Swallowing Impairment).        SLP GOALS     Row Name 09/14/22  1340 09/13/22 1345 09/12/22 0935       (LTG) Patient will demonstrate functional swallow for    Diet Texture (Demonstrate functional swallow) regular textures  - regular textures  -KL regular textures  -MP    Liquid viscosity (Demonstrate functional swallow) thin liquids  - thin liquids  -KL thin liquids  -MP    [place] (Demonstrate functional swallow) independently (over 90% accuracy)  -MH independently (over 90% accuracy)  -KL independently (over 90% accuracy)  -MP    Time Frame (Demonstrate functional swallow) by discharge  - by discharge  -KL by discharge  -MP    Progress/Outcomes (Demonstrate functional swallow) goal met  - good progress toward goal  -KL --       (STG) Patient will tolerate trials of    Consistencies Trialed (Tolerate trials) regular textures;thin liquids  - regular textures;thin liquids  -KL regular textures;thin liquids  -MP    Desired Outcome (Tolerate trials) without signs/symptoms of aspiration;without signs of distress  - without signs/symptoms of aspiration;without signs of distress  -KL without signs/symptoms of aspiration;without signs of distress  -MP    [place] (Tolerate trials) independently (over 90% accuracy)  - independently (over 90% accuracy)  -KL independently (over 90% accuracy)  -MP    Time Frame (Tolerate trials) by discharge  - by discharge  -KL by discharge  -MP    Progress/Outcomes (Tolerate trials) goal met  - good progress toward goal  -KL --    Comment (Tolerate trials) No overt s/s w/ thin liquids/ regular solid trials. Pt reports no difficulty w/ lunch tray, majority of tray eaten prior to session.  - Pt w/ no overt clinical s/sx of aspiration with thin liquids. Pt declined solid.  -KL --       Articulation Goal 1 (SLP)    Improve Articulation Goal 1 (SLP) by over-articulating in connected speech;80%;with minimal cues (75-90%)  -MH by over-articulating in connected speech;80%;with minimal cues (75-90%)  -KL by over-articulating in  connected speech;80%;with minimal cues (75-90%)  -MP    Time Frame (Articulation Goal 1, SLP) short term goal (STG)  -MH short term goal (STG)  -KL short term goal (STG)  -MP    Progress (Articulation Goal 1, SLP) 80%;with minimal cues (75-90%)  - -- --    Progress/Outcomes (Articulation Goal 1, SLP) continuing progress toward goal  -MH goal ongoing  -KL --    Comment (Articulation Goal 1, SLP) Pt able to overarticulate initally during connected speech, min cues required for pt to cont throughout conversation  - -- --       Additional Goal 1 (SLP)    Additional Goal 1, SLP LTG: Pt will improve communication in order to fully participate in care while in hospital setting w/ 100% acc and no cues  -MH LTG: Pt will improve communication in order to fully participate in care while in hospital setting w/ 100% acc and no cues  -KL LTG: Pt will improve communication in order to fully participate in care while in hospital setting w/ 100% acc and no cues  -MP    Time Frame (Additional Goal 1, SLP) by discharge  - by discharge  - by discharge  -MP    Progress/Outcomes (Additional Goal 1, SLP) continuing progress toward goal  - -- --          User Key  (r) = Recorded By, (t) = Taken By, (c) = Cosigned By    Initials Name Provider Type    Estrada Bassett, MS CCC-SLP Speech and Language Pathologist    Delia Ramirez MS CCC-SLP Speech and Language Pathologist    Denise Shannon, MS, CF-SLP Speech and Language Pathologist                   Time Calculation:    Time Calculation- SLP     Row Name 09/14/22 1522             Time Calculation- SLP    SLP Start Time 1340  -MH      SLP Received On 09/14/22  -              Untimed Charges    59652-TX Treatment/ST Modification Prosth Aug Alter  35  -MH      12424-DV Treatment Swallow Minutes 20  -MH              Total Minutes    Untimed Charges Total Minutes 55  -MH       Total Minutes 55  -MH            User Key  (r) = Recorded By, (t) = Taken By, (c) = Cosigned  By    Initials Name Provider Type     Denise Leonardo MS, CFY-SLP Speech and Language Pathologist                Therapy Charges for Today     Code Description Service Date Service Provider Modifiers Qty    45973650299 HC ST TREATMENT SWALLOW 1 9/14/2022 Denise Leonardo MS, CFOTONIEL-SLP GN 1    39336003142 HC ST TREATMENT SPEECH 2 9/14/2022 Denise Leonardo MS, CFY-SLP GN 1            Patient was not wearing a face mask and did not exhibit coughing during this therapy encounter.  Procedure performed was not aerosolizing, did not involve close contact (within 6 feet for at least 15 minutes or longer), and did not involve contact with infectious secretions or specimens.  Therapist used appropriate personal protective equipment including gloves, standard procedure mask and eye protection.  Appropriate PPE was worn during the entire therapy session.  Hand hygiene was completed before and after therapy session.       Densie Leonardo MS, WANDER-SLP  9/14/2022

## 2022-09-14 NOTE — PLAN OF CARE
Goal Outcome Evaluation:      SLP treatment completed. Will continue to address communication. Please see note for further details and recommendations.

## 2022-09-14 NOTE — PLAN OF CARE
Goal Outcome Evaluation:  Plan of Care Reviewed With: patient           Outcome Evaluation: PT IS ALERT AND MOTIVATED TO WORK WITH THERAPY. FOLLOWS ALL COMMANDS BUT NEEDS MAX CUES FOR SAFETY WITH MOBILITY AND MANUAL ASSIST TO GUIDE R WALKER DURING GAIT DUE TO [place] AND IMPAIRED BALANCE. PT AMBULATED 80 FEET WITH R WALKER AND MIN/MOD ASSIST. RECOMMEND IRF AT D/C FOR BEST OUTCOME.

## 2022-09-14 NOTE — DISCHARGE PLACEMENT REQUEST
"Nehal Parker (89 y.o. Female)             Date of Birth   02/15/1926    Social Security Number       Address   [address] [place] [id number]    Home Phone   [phone]    MRN   [id number]       [employer]    Marital Status                               Admission Date   9/11/22    Admission Type   Emergency    Admitting Provider   Jes Johnson MD    Attending Provider   Jes Johnson MD    Department, Room/Bed   [facility] 3E, S337/1       Discharge Date       Discharge Disposition       Discharge Destination                               Attending Provider: Jes Johnson MD    Allergies: Levaquin [Levofloxacin], Penicillins, Sulfa Antibiotics    Isolation: None   Infection: None   Code Status: No CPR   Advance Care Planning Activity    Ht: 160 cm (62.99\")   Wt: 60.2 kg (132 lb 11.5 oz)    Admission Cmt: None   Principal Problem: None                Active Insurance as of 9/11/2022     Primary Coverage     Payor Plan Insurance Group Employer/Plan Group    MEDICARE MEDICARE A & B      Payor Plan Address Payor Plan Phone Number Payor Plan Fax Number Effective Dates    [address] [phone]  2/1/1991 - None Entered    [facility] [address]       Subscriber Name Subscriber Birth Date Member ID       NEHAL PARKER 2/15/1926 [id number]           Secondary Coverage     Payor Plan Insurance Group Employer/Plan Group    [employer] C[employer] OPTIONS      Payor Plan Address Payor Plan Phone Number Payor Plan Fax Number Effective Dates    [facility] [phone]  1/1/2019 - None Entered    [address]       [facility] [address]       Subscriber Name Subscriber Birth Date Member ID       NEHAL PARKER 2/15/1926 [id number]                 Emergency Contacts      (Rel.) Home Phone Work Phone Mobile Phone    GRANT CARTER (Daughter) [phone] -- --    Dea Parker (Daughter) -- -- [phone]    "         {Documentation Categories:[id number]}

## 2022-09-14 NOTE — PROGRESS NOTES
[facility] Medicine Services  PROGRESS NOTE    Patient Name: Nehal Parker  : 2/15/1926  MRN: [id number]    Date of Admission: 2022  Primary Care Physician: Brenotn Clay MD    Subjective     CC: f/u stroke     HPI:  Sitting up in bed, eating breakfast. More alert today. Says she thinks she feels better today. Hematuria has improved. She doesn't want to go to rehab. It appears her assisted living facility wants her to go to rehab, however, and she has agreed to a referral to [facility].     ROS:  Gen- No fevers, chills  CV- No chest pain, palpitations  Resp- No cough, dyspnea  GI- No N/V/D, abd pain    Objective     Vital Signs:   Temp:  [97.9 °F (36.6 °C)-98.6 °F (37 °C)] 98.3 °F (36.8 °C)  Heart Rate:  [65-71] 68  Resp:  [16-20] 18  BP: (135-190)/() 135/74  Flow (L/min):  [2] 2     Physical Exam:  Constitutional: No acute distress. Drowsy but woke and conversed   HENT: NCAT, mucous membranes moist  Respiratory: Clear to auscultation bilaterally, respiratory effort normal on rooma ir   Cardiovascular: RRR, no murmurs, rubs, or gallops  Gastrointestinal: Positive bowel sounds, soft, nontender, nondistended  Musculoskeletal: No bilateral ankle edema. Mild, non-pitting upper extremity edema   Psychiatric: Appropriate affect, cooperative  Neurologic: Oriented x 3, moves all extremities spontaneously without focal deficits, mild speech dysarthria    Results Reviewed:  LAB RESULTS:      Lab 22  0509 22  0033 22  2143 22  0444 22  0756 22  1349 22  1344   WBC 10.58  --   --  12.15* 7.94 8.33  --    HEMOGLOBIN 12.4  --   --  13.8 13.0 13.2  --    HEMOGLOBIN, POC  --   --   --   --   --   --  14.3   HEMATOCRIT 38.9  --   --  42.5 39.5 40.4  --    HEMATOCRIT POC  --   --   --   --   --   --  42   PLATELETS 239  --   --  300 265 282  --    NEUTROS ABS 8.43*  --   --  10.28*  --  6.46  --    IMMATURE GRANS (ABS) 0.03  --   --  0.04  --  0.02   --    LYMPHS ABS 0.99  --   --  0.89  --  1.20  --    MONOS ABS 0.78  --   --  0.70  --  0.44  --    EOS ABS 0.30  --   --  0.19  --  0.15  --    MCV 89.2  --   --  88.4 86.4 87.4  --    LACTATE  --  1.7 2.1*  --   --   --   --    PROTIME  --   --   --   --   --   --  15.0   APTT  --   --   --   --   --  31.4  --          Lab 09/14/22  0509 09/13/22  0444 09/12/22  0756 09/11/22  1344   SODIUM 136 135* 136  --    POTASSIUM 4.4 4.1 4.1  --    CHLORIDE 103 101 99  --    CO2 23.0 20.0* 25.0  --    ANION GAP 10.0 14.0 12.0  --    BUN 15 12 12  --    CREATININE 0.63 0.57 0.56* 0.60   EGFR 81.3 83.3 83.6 82.3   GLUCOSE 99 124* 95  --    CALCIUM 9.4 9.9* 10.0*  --    HEMOGLOBIN A1C  --   --  5.90*  --    TSH  --   --  1.570  --          Lab 09/12/22  0756 09/11/22  1349   TOTAL PROTEIN 7.0  --    ALBUMIN 4.20  --    GLOBULIN 2.8  --    ALT (SGPT) 10 11   AST (SGOT) 28 23   BILIRUBIN 0.5  --    ALK PHOS 97  --          Lab 09/11/22  1349 09/11/22  1344   TROPONIN T <0.010  --    PROTIME  --  15.0   INR  --  1.3*         Lab 09/12/22  0756   CHOLESTEROL 205*   LDL CHOL 127*   HDL CHOL 60   TRIGLYCERIDES 103     Brief Urine Lab Results  (Last result in the past 365 days)      Color   Clarity   Blood   Leuk Est   Nitrite   Protein   CREAT   Urine HCG        09/13/22 1424 Red   Cloudy   Large (3+)   Large (3+)   Negative   100 mg/dL (2+)               Microbiology Results Abnormal     None        EEG    Result Date: 9/13/2022  Reason for referral: 89 y.o.female with altered mental status, decreased level of consciousness Technical Summary:  A 19 channel digital EEG was performed using the international 10-20 placement system, including eye leads and EKG leads. Duration: 20 minutes Findings: When the patient is awake, medium amplitude theta is seen over the left hemisphere, rest by contrast slower medium amplitude 2 to 4 Hz delta is seen with underlying theta present.  As a study proceeds, intermittent rhythmic delta activity is  seen over the bilateral frontal regions, with greater prominence on the right.  Sleep is seen with slowing of the background; independent right hemispheric slowing persists.  No epileptiform activity is seen.  Photic stimulation does not change the background.  Hyperventilation is not performed. Video: On Technical quality: Superior EK to 70 bpm SUMMARY: Mild generalized slow with independent rhythmic frontal dominant delta present Independent right hemispheric slow, moderate No epileptiform activity seen     Impression: Focal cerebral dysfunction primarily impacting the right hemisphere Underlying mild diffuse cerebral dysfunction No evidence for epilepsy is seen This report is transcribed using the Dragon dictation system.      CT Head Without Contrast    Result Date: 2022  CT HEAD WO CONTRAST-  Date of Exam: 2022 12:51 PM  Indication: Stroke, follow up; I63.511-Cerebral infarction due to unspecified occlusion or stenosis of right middle cerebral artery; I10-Essential (primary) hypertension; Z66-Do not resuscitate.  Comparison: MRI brain without and with contrast 2022.  Technique:  Without contrast, contiguous axial CT images of the head were obtained from skull base to vertex.  Coronal and sagittal reconstructions were performed.  Automated exposure control and iterative reconstruction methods were used.  FINDINGS Hypodensity is seen within the cortical-subcortical right frontal lobe and anterior right temporal lobe consistent with the recent infarct documented on yesterday's MRI brain. There is no indication of hemorrhagic transformation. No additional infarct is seen.  There is mild generalized atrophy with compensatory prominence of the ventricles and extra-axial spaces. Mild scattered deep white matter hypodensities are favored to reflect changes of chronic microvascular disease. There is a chronic lacunar infarct within the right basal ganglia.  No acute calvarial abnormality. Minor  ethmoid sinus mucosal thickening. Mastoid air cells are clear. Mild intracranial carotid artery calcifications.      Impression:  1. Right frontal/anterior temporal lobe cortical/subcortical acute/subacute infarct, corresponding to yesterday's MRI brain. The distribution of infarct is not thought to be significantly changed, and there is no indication of hemorrhagic transformation. 2. Atrophy. 3. Mild scattered chronic microvascular disease.  This report was finalized on 9/13/2022 1:06 PM by Debbie Otto MD.      CT Abdomen Pelvis Stone Protocol    Result Date: 9/14/2022  DATE OF EXAM: 9/13/2022 9:22 PM  PROCEDURE: CT ABDOMEN PELVIS STONE PROTOCOL-  INDICATIONS: hematuria, UTI. stone suspected; I63.511-Cerebral infarction due to unspecified occlusion or stenosis of right middle cerebral artery; I10-Essential (primary) hypertension; Z66-Do not resuscitate  COMPARISON: No comparisons available.  TECHNIQUE: Routine transaxial slices were obtained through the abdomen and pelvis without the administration of intravenous contrast. Reconstructed coronal and sagittal images were also obtained. Automated exposure control and iterative reconstruction methods were used.  The radiation dose reduction device was turned on for each scan per the ALARA (As Low as Reasonably Achievable) protocol.  FINDINGS: The exam is somewhat limited owing to respiratory motion artifact. Evaluation of the lower pelvis is somewhat limited owing to extensive streak artifact and beam hardening owing to bilateral hip hardware.  There are chronic interstitial changes in the partially imaged lung bases without focal consolidation. Mitral annular calcifications and ectatic ascending thoracic aorta are noted. A few circumscribed small hepatic hypodensities are noted, likely hepatic cyst. Otherwise unremarkable appearance of the liver. Unremarkable appearance of the gallbladder and bile ducts. Normal appearance of the spleen. There is fatty infiltration  of the pancreas without pancreatic ductal dilatation. Normal appearance of the adrenal glands. Unremarkable noncontrast appearance of the kidneys. No evidence of hydronephrosis. No definite nephrolithiasis. No perinephric fat stranding or fluid. Normal appearance of the ureters without evidence of ureteral stone, noting that the distal-most ureters at the ureterovesical junctions are not well imaged owing to extensive streak artifact. Partially imaged portions of the urinary bladder appear grossly unremarkable. The uterus surgically absent. There is a 3.1 cm thin-walled cystic structure in the left pelvis adjacent to the left iliac vessels, possibly ovarian cyst. There is inspissated stool in the rectum. There is sigmoid colonic diverticulosis without evidence of diverticulitis. There is a large hiatal hernia, incompletely visualized. No evidence of bowel obstruction or definite inflammatory change of the GI tract. Moderate colonic stool burden. No abdominopelvic free fluid or conspicuous fat stranding. No pneumoperitoneum. There is atherosclerosis and mild ectasia of the abdominal aorta without aneurysm. No lymphadenopathy. The body wall soft tissues appear unremarkable. The bones are severely demineralized. No definite acute osseous findings. There are degenerative changes throughout the imaged spine. There is partial visualization of bilateral total hip arthroplasties.      Impression: No findings to explain hematuria. Specifically, no evidence of nephrolithiasis.  Large hiatal hernia.  Moderate colonic stool burden with inspissated stool in the rectum compatible constipation.  Colonic diverticulosis without diverticulitis.  This report was finalized on 9/14/2022 12:05 AM by Cedrick Haq MD.      Results for orders placed during the hospital encounter of 09/11/22    Adult Transthoracic Echo Complete W/ Cont if Necessary Per Protocol (With Agitated Saline)    Interpretation Summary  · Left ventricular wall  thickness is consistent with mild concentric hypertrophy.  · Left ventricular ejection fraction appears to be 56 - 60%.  · Left atrial volume is mildly increased.  · There is calcification of the aortic valve.  · Mild dilation of the aortic root is present. Mild dilation of the sinuses of Valsalva is present. Mild dilation of the ascending aorta is present.  · MAC and calcification of subvalvular structures but no significant MS  · Mild MR    I have reviewed the medications:  Scheduled Meds:aspirin, 81 mg, Oral, Daily  atorvastatin, 40 mg, Oral, Nightly  aztreonam, 1 g, Intravenous, Q8H  levothyroxine, 112 mcg, Oral, Q AM  losartan, 50 mg, Oral, Q24H  pantoprazole, 40 mg, Oral, QAM  sertraline, 25 mg, Oral, Daily  sodium chloride, 10 mL, Intravenous, Q12H  sodium chloride, 10 mL, Intravenous, Q12H  terazosin, 1 mg, Oral, Nightly      Continuous Infusions:   PRN Meds:.•  acetaminophen **OR** [DISCONTINUED] acetaminophen **OR** [DISCONTINUED] acetaminophen  •  hydrALAZINE  •  ondansetron **OR** ondansetron  •  sodium chloride  •  sodium chloride  •  sodium chloride    Assessment & Plan     Active Hospital Problems    Diagnosis  POA   • Acute left-sided weakness [R53.1]  Yes   • Acute CVA (cerebrovascular accident) (HCC) [I63.9]  Yes   • HLD (hyperlipidemia) [E78.5]  Yes   • Essential hypertension [I10]  Yes   • Hypothyroidism (acquired) [E03.9]  Yes   • Mild depression [F32.A]  Yes      Resolved Hospital Problems   No resolved problems to display.     Brief Hospital Course to date:  Nehal Parker is a 89 y.o. female with past medical history of essential hypertension, dyslipidemia, hypothyroidism, depression, TIA, history of PE who was recently taken off Xarelto (that she was taking for recurrent PE) who presented to the hospital with left-sided weakness, left facial droop and slurred speech.  She was found to have acute stroke but was not candidate for tPA (outside the window) no revascularization because of her  age     Acute right MCA ischemic stroke  - Appreciate stroke neurology assistance  - Not candidate for tPA (outside the window) / not candidate for intervention/revascularization (age)  - Symptoms (slurred speech, L facial droop and left-sided weakness) are improving  - CTA head/neck showed multiple areas of high-grade narrowing or occlusion within the distal M2 branches of the right MCA. Multiple focal high-grade stenoses in both posterior cerebral arteries  - CT cerebral perfusion: 27 mL brain risk in the right MCA distribution with 12 mL central core infarct  - MRI brain confirmed acute right frontotemporal region infarct  - Continue ASA / high intensity statin  - PT/ OT following. OT recommends rehab. Patient prefers to return to her assisted living facility but [facility] wants her to go to rehab and she has agreed to referral to [facility]  - SLP following - on regular diet    Hypertensive emergency, resolved  - BP improved  - Continue Losartan 50mg daily, add Terazosin 1mg QHS    Acute UTI  Hematuria, improved  - UA concerning for UTI. Follow culture  - Due to hematuria, CT stone protocol obtained and was negative for nephrolithiasis   - PCN allergic. Per records from [facility] (2019) she has anaphylaxis to PCN. I attempted to discuss with patient but she does not know what her reaction to PCN is.   - Continue Aztreonam. Transition to PO abx when able    Hypothyroidism  - Continue Levothyroxine     Dyslipidemia  -   - On Lipitor 40mg daily given her age     Depression  - Continue Sertraline      History of recurrent PE  - Recently taken off Xarelto (unclear reason)  - Holding off on anticoagulation given the size of her acute stroke    Expected Discharge Location and Transportation: rehab via EMS or medical transport van   Expected Discharge Date: 9/17/22     DVT prophylaxis:  Mechanical DVT prophylaxis orders are present.     AM-PAC 6 Clicks Score (PT): 13 (09/13/22 8804)    CODE STATUS:   Code Status and Medical  Interventions:   Ordered at: 09/11/22 1514     Level Of Support Discussed With:    Patient     Code Status (Patient has no pulse and is not breathing):    No CPR (Do Not Attempt to Resuscitate)     Medical Interventions (Patient has pulse or is breathing):    Full Support     Belkys Hanson PA-C  09/14/22

## 2022-09-14 NOTE — PROGRESS NOTES
Stroke Progress Note       Chief Complaint:  Left sided weakness    Subjective    Subjective     Subjective:  Remains drowsy this AM.  She does arouse to verbal stimuli.  Follows simple commands.  Repeat CT of her head yesterday was stable.  Currently being treated for UTI.    Review of Systems   Unable to perform ROS: Mental status change            Objective    Objective      Temp:  [97.7 °F (36.5 °C)-98.3 °F (36.8 °C)] 97.9 °F (36.6 °C)  Heart Rate:  [60-71] 65  Resp:  [16-18] 16  BP: (104-190)/() 159/86        Neurological Exam  Mental Status  Arousable to verbal stimuli. Oriented only to person, place and time. Oriented to person, place, and time. Moderate dysarthria present. Mixed aphasia present. Able to name objects.    Cranial Nerves  CN II: Visual fields full to confrontation.  CN III, IV, VI: Extraocular movements intact bilaterally. Normal lids and orbits bilaterally. Pupils equal round and reactive to light bilaterally.  CN V: Facial sensation is normal.  CN VII:  Right: There is no facial weakness.  Left: There is central facial weakness.  CN XI: Shoulder shrug strength is normal.  CN XII: Tongue midline without atrophy or fasciculations.    Motor  Normal muscle bulk throughout. No fasciculations present. Normal muscle tone. Strength is 5/5 in all four extremities except as noted.  Left upper extremity slight drift, strength is 4/5.  Left lower extremity 3/5.    Sensory  Light touch is normal in upper and lower extremities.     Reflexes                                            Right                      Left  Plantar                           Downgoing                Downgoing    Coordination    Not tested secondary to mentation.    Gait    Not tested.      Physical Exam  Vitals and nursing note reviewed.   Constitutional:       Appearance: Normal appearance.   HENT:      Head: Normocephalic and atraumatic.   Eyes:      General: Lids are normal.      Extraocular Movements: Extraocular movements  intact.      Pupils: Pupils are equal, round, and reactive to light.   Cardiovascular:      Rate and Rhythm: Normal rate and regular rhythm.   Pulmonary:      Effort: Pulmonary effort is normal. No respiratory distress.   Musculoskeletal:      Cervical back: Normal range of motion and neck supple.      Right lower leg: No edema.      Left lower leg: No edema.   Skin:     General: Skin is warm and dry.   Neurological:      Mental Status: She is oriented to person, place, and time. She is lethargic.      Cranial Nerves: Cranial nerve deficit and dysarthria present.      Motor: Weakness present.   Psychiatric:         Mood and Affect: Mood normal.         Behavior: Behavior normal.         Results Review:    I reviewed the patient's new clinical results.  WBC   Date Value Ref Range Status   09/14/2022 10.58 3.40 - 10.80 10*3/mm3 Final     RBC   Date Value Ref Range Status   09/14/2022 4.36 3.77 - 5.28 10*6/mm3 Final     Hemoglobin   Date Value Ref Range Status   09/14/2022 12.4 12.0 - 15.9 g/dL Final     Hematocrit   Date Value Ref Range Status   09/14/2022 38.9 34.0 - 46.6 % Final     MCV   Date Value Ref Range Status   09/14/2022 89.2 79.0 - 97.0 fL Final     MCH   Date Value Ref Range Status   09/14/2022 28.4 26.6 - 33.0 pg Final     MCHC   Date Value Ref Range Status   09/14/2022 31.9 31.5 - 35.7 g/dL Final     RDW   Date Value Ref Range Status   09/14/2022 14.2 12.3 - 15.4 % Final     RDW-SD   Date Value Ref Range Status   09/14/2022 46.5 37.0 - 54.0 fl Final     MPV   Date Value Ref Range Status   09/14/2022 10.2 6.0 - 12.0 fL Final     Platelets   Date Value Ref Range Status   09/14/2022 239 140 - 450 10*3/mm3 Final     Neutrophil %   Date Value Ref Range Status   09/14/2022 79.6 (H) 42.7 - 76.0 % Final     Lymphocyte %   Date Value Ref Range Status   09/14/2022 9.4 (L) 19.6 - 45.3 % Final     Monocyte %   Date Value Ref Range Status   09/14/2022 7.4 5.0 - 12.0 % Final     Eosinophil %   Date Value Ref Range  Status   09/14/2022 2.8 0.3 - 6.2 % Final     Basophil %   Date Value Ref Range Status   09/14/2022 0.5 0.0 - 1.5 % Final     Immature Grans %   Date Value Ref Range Status   09/14/2022 0.3 0.0 - 0.5 % Final     Neutrophils, Absolute   Date Value Ref Range Status   09/14/2022 8.43 (H) 1.70 - 7.00 10*3/mm3 Final     Lymphocytes, Absolute   Date Value Ref Range Status   09/14/2022 0.99 0.70 - 3.10 10*3/mm3 Final     Monocytes, Absolute   Date Value Ref Range Status   09/14/2022 0.78 0.10 - 0.90 10*3/mm3 Final     Eosinophils, Absolute   Date Value Ref Range Status   09/14/2022 0.30 0.00 - 0.40 10*3/mm3 Final     Basophils, Absolute   Date Value Ref Range Status   09/14/2022 0.05 0.00 - 0.20 10*3/mm3 Final     Immature Grans, Absolute   Date Value Ref Range Status   09/14/2022 0.03 0.00 - 0.05 10*3/mm3 Final     nRBC   Date Value Ref Range Status   09/14/2022 0.0 0.0 - 0.2 /100 WBC Final     Lab Results   Component Value Date    GLUCOSE 99 09/14/2022    BUN 15 09/14/2022    CREATININE 0.63 09/14/2022    BCR 23.8 09/14/2022    K 4.4 09/14/2022    CO2 23.0 09/14/2022    CALCIUM 9.4 09/14/2022    ALBUMIN 4.20 09/12/2022    AST 28 09/12/2022    ALT 10 09/12/2022     Hgb A1c 5.9  Lipid panel:  , HDL 60, , triglycerides 103      EEG    Result Date: 9/13/2022  Focal cerebral dysfunction primarily impacting the right hemisphere Underlying mild diffuse cerebral dysfunction No evidence for epilepsy is seen This report is transcribed using the Dragon dictation system.      CT Head Without Contrast    Result Date: 9/13/2022   1. Right frontal/anterior temporal lobe cortical/subcortical acute/subacute infarct, corresponding to yesterday's MRI brain. The distribution of infarct is not thought to be significantly changed, and there is no indication of hemorrhagic transformation. 2. Atrophy. 3. Mild scattered chronic microvascular disease.  This report was finalized on 9/13/2022 1:06 PM by Debbie Otto MD.      MRI  Brain Without Contrast    Result Date: 9/12/2022  Acute infarct in the right frontotemporal region as above.  Findings discussed with stroke navigator by Dr. Cedrick Haq via telephone at 3:10 PM 9/12/2022.  This report was finalized on 9/12/2022 3:11 PM by Cedrick Haq MD.      CT Abdomen Pelvis Stone Protocol    Result Date: 9/14/2022  No findings to explain hematuria. Specifically, no evidence of nephrolithiasis.  Large hiatal hernia.  Moderate colonic stool burden with inspissated stool in the rectum compatible constipation.  Colonic diverticulosis without diverticulitis.  This report was finalized on 9/14/2022 12:05 AM by Cedrick Haq MD.        Results for orders placed during the hospital encounter of 09/11/22    Adult Transthoracic Echo Complete W/ Cont if Necessary Per Protocol (With Agitated Saline)    Interpretation Summary  · Left ventricular wall thickness is consistent with mild concentric hypertrophy.  · Left ventricular ejection fraction appears to be 56 - 60%.  · Left atrial volume is mildly increased.  · There is calcification of the aortic valve.  · Mild dilation of the aortic root is present. Mild dilation of the sinuses of Valsalva is present. Mild dilation of the ascending aorta is present.  · MAC and calcification of subvalvular structures but no significant MS  · Mild MR            Assessment/Plan     Assessment/Plan: 89-year-old female with a PMH significant for TIA, PE, HTN, HLD, depression, and hypothyroidism who presents to the [facility] ED with complaints of left facial droop, left-sided weakness and slurred speech.  Initial NIH 6.Patient is not a candidate for TNKase due to unknown certain last known well. Imaging discussed with Dr. Pendleton, Dr. Madrid, the patient, and her daughters.  Patient's baseline MRS Is 3.  Risk of intervention is greater than the benefit.      PTA antiplatelet: None  PTA anticoagulant: None      AIS in the RMCA territory d/t occlusion of the distal M2  branches of the RMCA superior division  -MRI with and AIS in the right frontotemporal region as well as asymptomatic microhemorrhage   -Etiology suspicious for cardioembolic; questionable history of atrial fibrillation  -Patient had previously been on Xarelto for Pes.it was discontinued several months ago due to increasing falls.    -CT head performed yesterday D/T increased lethargy revealed a right frontotemporal acute to subacute infarct with no significant changes noted.  -Echo shows an EF of 60%, mild LAE, no bubble study due to age protocol  -Recommend holding off on starting anticoagulation for at least the next 2 weeks given size of infarct and microhemorrhage noted on MRI.  Will discuss plans for possible OAC in the future with the patient's daughters.  -EEG with focal cerebral dysfunction in the right hemisphere, mild diffuse cerebral dysfunction, and no epileptiform activity seen.  -Continue aspirin 81 mg daily  -Continue PT/OT; PT recommending IRF at discharge (patient is a resident of [facility] and prefers to return there)  -CM following for discharge needs  -Stroke neurology will continue to follow  -Patient will follow-up in the stroke clinic in 4 to 6 weeks after discharge.    2.  HTN  -Normal blood pressure parameters  -Goal less than 130/80  -Management per primary team    3.  HLD  Lipid panel:  , HDL 60, , triglycerides 103  -Goal LDL <70 secondary stroke prevention  -Continue atorvastatin 40 mg daily    4.  Depression  -Continue sertraline    Plan discussed with the patient, her daughter, and nursing staff.  Stroke neurology will continue to follow.  Please call with any questions or concerns.    ZEYNEP Garces, AGACNP-BC  09/14/22  08:32 EDT

## 2022-09-14 NOTE — CASE MANAGEMENT/SOCIAL WORK
Continued Stay Note  [facility]     Patient Name: Nehal Parker  MRN: [id number]  Today's Date: 9/14/2022    Admit Date: 9/11/2022     Discharge Plan     Row Name 09/14/22 1427       Plan    Plan rehab    Patient/Family in Agreement with Plan yes    Plan Comments I met with Ms. Parker at the bedside to discuss her discharge plan. I spoke with Bhavana, , at [facility] re: Ms. Parker discharge plan. She also thinks that MS. Parker needs to go to inpatient rehab rather than returning home. Ms. Parker verbalizes agreement and requests to go to [facility]. I have call a referral to [facility].    Final Discharge Disposition Code 62 - inpatient rehab facility               Discharge Codes    No documentation.                 Brynn Huizar RN

## 2022-09-14 NOTE — THERAPY TREATMENT NOTE
Patient Name: Nehal Parker  : 2/15/1926    MRN: [id number]                              Today's Date: 2022       Admit Date: 2022    Visit Dx:     ICD-10-CM ICD-9-CM   1. Acute ischemic right MCA stroke (HCC)  I63.511 434.91   2. Elevated blood pressure reading with diagnosis of hypertension  I10 401.9   3. DNR (do not resuscitate)  Z66 V49.86     Patient Active Problem List   Diagnosis   • Acute left-sided weakness   • Acute CVA (cerebrovascular accident) (HCC)   • HLD (hyperlipidemia)   • Essential hypertension   • Hypothyroidism (acquired)   • Mild depression     Past Medical History:   Diagnosis Date   • Depression    • Disease of thyroid gland    • Hypertension      History reviewed. No pertinent surgical history.   General Information     Row Name 22 1521          OT Time and Intention    Document Type therapy note (daily note)  -JY     Mode of Treatment occupational therapy;individual therapy  -JY     Row Name 22 1521          General Information    Patient Profile Reviewed yes  -JY     Existing Precautions/Restrictions fall;other (see comments)  baseline numbness at R 4th and 5th digits, [place], recent fall hx  -JY     Barriers to Rehab medically complex;previous functional deficit;hearing deficit  -JY     Row Name 22 1521          Cognition    Orientation Status (Cognition) oriented x 4  -JY     Row Name 22 1521          Safety Issues, Functional Mobility    Safety Issues Affecting Function (Mobility) ability to follow commands;awareness of need for assistance;insight into deficits/self-awareness;safety precaution awareness;safety precautions follow-through/compliance;sequencing abilities;problem-solving  -JY     Impairments Affecting Function (Mobility) balance;endurance/activity tolerance;postural/trunk control;sensation/sensory awareness;strength;cognition;motor control  -JY     Cognitive Impairments, Mobility Safety/Performance attention;insight into  deficits/self-awareness;problem-solving/reasoning;safety precaution awareness;safety precaution follow-through;sequencing abilities  -JY     Comment, Safety Issues/Impairments (Mobility) pt with need for safety cues throughout mobility, increased time to processs commands as pt fatigued when standing and attempting posterior stepping in SPT  -JY           User Key  (r) = Recorded By, (t) = Taken By, (c) = Cosigned By    Initials Name Provider Type    Jessica Higgins OT Occupational Therapist                 Mobility/ADL's     Row Name 09/14/22 1524          Bed Mobility    Bed Mobility sit-supine;scooting/bridging  -JY     Scooting/Bridging Hall (Bed Mobility) moderate assist (50% patient effort);verbal cues  -JY     Sit-Supine Hall (Bed Mobility) moderate assist (50% patient effort);verbal cues  -JY     Bed Mobility, Safety Issues decreased use of arms for pushing/pulling;decreased use of legs for bridging/pushing;impaired trunk control for bed mobility  -JY     Assistive Device (Bed Mobility) bed rails;head of bed elevated  -JY     Comment, (Bed Mobility) seq and hand placement cues throughout bed mobility with increased time to scoot posteriorly on bed to better align self for returning to sidelying then supine  -JY     Row Name 09/14/22 1524          Transfers    Transfers sit-stand transfer;stand-sit transfer;bed-chair transfer  -JY     Comment, (Transfers) frequent cues for seq to advance LEs and complete pivot and in initially standing to stand more erect for stability; increased time to return demo optimal seq; incontinent of urine upon standing  -JY     Sit-Stand Hall (Transfers) moderate assist (50% patient effort);verbal cues  -JY     Stand-Sit Hall (Transfers) moderate assist (50% patient effort);verbal cues  -JY     Row Name 09/14/22 1524          Sit-Stand Transfer    Assistive Device (Sit-Stand Transfers) walker, front-wheeled  -EYAD     Row Name 09/14/22 1525           Stand-Sit Transfer    Assistive Device (Stand-Sit Transfers) walker, front-wheeled  -EYAD     [facility] Name 09/14/22 1524          Functional Mobility    Functional Mobility- Ind. Level moderate assist (50% patient effort);verbal cues required  -JY     Functional Mobility- Device walker, front-wheeled  -JY     Functional Mobility- Comment defer to PT for specifics  -JY     Row Name 09/14/22 1524          Activities of Daily Living    BADL Assessment/Intervention lower body dressing;toileting;grooming  -JY     Row Name 09/14/22 1524          Lower Body Dressing Assessment/Training    [place] Level (Lower Body Dressing) doff;don;socks;dependent (less than 25% patient effort)  -     Position (Lower Body Dressing) unsupported sitting;edge of bed sitting  -JY     Row Name 09/14/22 1524          Grooming Assessment/Training    [place] Level (Grooming) wash face, hands;set up  -     Position (Grooming) sitting up in bed  -JY     Row Name 09/14/22 1524          Toileting Assessment/Training    [place] Level (Toileting) perform perineal hygiene;change pad/brief;dependent (less than 25% patient effort);verbal cues  -     Assistive Devices (Toileting) other (see comments)  using pure wic, incontinent episode upon standing that req'd dep care for hygiene and adjustment of external catheter  -     Position (Toileting) sitting up in bed  -           User Key  (r) = Recorded By, (t) = Taken By, (c) = Cosigned By    Initials Name Provider Type    Jessica Higgins OT Occupational Therapist               Obj/Interventions     Row Name 09/14/22 1528          Motor Skills    Motor Skills functional endurance  -J     Functional Endurance fatigued after fxl transfer from recliner to bed, attempted to sit prior to safety readiness  -[facility] Name 09/14/22 1528          Balance    Balance Assessment sitting static balance;sitting dynamic balance;standing static balance;standing dynamic balance  -     Static Sitting  Balance contact guard;verbal cues  -JY     Dynamic Sitting Balance contact guard;verbal cues  -JY     Position, Sitting Balance supported;unsupported;sitting edge of bed  -JY     Static Standing Balance moderate assist;verbal cues  -JY     Dynamic Standing Balance moderate assist;verbal cues  -JY     Position/Device Used, Standing Balance supported;walker, front-wheeled  -JY     Balance Interventions sitting;standing;static;dynamic;sit to stand;supported;occupation based/functional task  -JY     Comment, Balance no overt LOB yet need for postural cues for upright sitting and standing for stability, used FWW for support in standing  -JY           User Key  (r) = Recorded By, (t) = Taken By, (c) = Cosigned By    Initials Name Provider Type    Jessica Higgins, BHUPENDRA Occupational Therapist               Goals/Plan    No documentation.                Clinical Impression     Row Name 09/14/22 1530          Pain Assessment    Pretreatment Pain Rating 0/10 - no pain  -JY     Posttreatment Pain Rating 0/10 - no pain  -JY     Pre/Posttreatment Pain Comment denies any pain and tolerated all OT interventions  -JY     Pain Intervention(s) Repositioned;Ambulation/increased activity  -JY     Row Name 09/14/22 1530          Plan of Care Review    Plan of Care Reviewed With patient  -JY     Progress no change  -JY     Outcome Evaluation Pt alert and participatory in OT interventions. Required increased time to process commands/prompts/cues provided for improved mobility i.e. STS and SPT recliner > bed. Pt grossly req'd mod A for STS and SPT with FWW and for sitting > supine and scooting up in bed. Pt fatigued with more dynamic tasks and attempted to sit prior to readiness for safety at EOB. Pt presented with incontinent urine episode upon standing resulting in increased safety cues and dependent care for d/d socks and toileting hygiene. SUA for hand and face hygiene while sitting up in bed. Will progress pt in OT interventions as  able while hospitalized. Continue to recommend IRF at d/c.  -JOTONIEL     Row Name 09/14/22 1530          Therapy Assessment/Plan (OT)    Rehab Potential (OT) good, to achieve stated therapy goals  -J     Criteria for Skilled Therapeutic Interventions Met (OT) yes;skilled treatment is necessary  -JY     Therapy Frequency (OT) daily  -JY     Row Name 09/14/22 1530          Therapy Plan Review/Discharge Plan (OT)    Anticipated Discharge Disposition (OT) inpatient rehabilitation facility  -     Row Name 09/14/22 1530          Vital Signs    Pre Systolic BP Rehab 135  -JY     Pre Treatment Diastolic BP 74  -JY     Pretreatment Heart Rate (beats/min) 62  -JY     Posttreatment Heart Rate (beats/min) 60  -JY     Pre SpO2 (%) 97  -JY     O2 Delivery Pre Treatment room air  -JY     O2 Delivery Intra Treatment room air  -JY     Post SpO2 (%) 95  -JY     O2 Delivery Post Treatment room air  -JY     Pre Patient Position Sitting  -JY     Intra Patient Position Standing  -JY     Post Patient Position Supine  -JY     Row Name 09/14/22 1530          Positioning and Restraints    Pre-Treatment Position sitting in chair/recliner  -JY     Post Treatment Position bed  -JY     In Bed notified nsg;fowlers;call light within reach;encouraged to call for assist;exit alarm on;side rails up x2;legs elevated;heels elevated  -JY           User Key  (r) = Recorded By, (t) = Taken By, (c) = Cosigned By    Initials Name Provider Type    Jessica Higgins, BHUPENDRA Occupational Therapist               Outcome Measures     Row Name 09/14/22 1536          How much help from another is currently needed...    Putting on and taking off regular lower body clothing? 1  -JY     Bathing (including washing, rinsing, and drying) 2  -JY     Toileting (which includes using toilet bed pan or urinal) 2  -JY     Putting on and taking off regular upper body clothing 3  -JY     Taking care of personal grooming (such as brushing teeth) 3  -JY     Eating meals 3  -JY      [facility] 6 Clicks Score (OT) 14  -JY     Row Name 09/14/22 1536          Functional Assessment    Outcome Measure Options -PAC 6 Clicks Daily Activity (OT)  -JY           User Key  (r) = Recorded By, (t) = Taken By, (c) = Cosigned By    Initials Name Provider Type    Jessica Higgins OT Occupational Therapist                Occupational Therapy Education                 Title: PT OT SLP Therapies (In Progress)     Topic: Occupational Therapy (In Progress)     Point: ADL training (In Progress)     Description:   Instruct learner(s) on proper safety adaptation and remediation techniques during self care or transfers.   Instruct in proper use of assistive devices.              Learning Progress Summary           Patient Acceptance, E,D, NR by EYAD at 9/14/2022 1431    Acceptance, E,D, NR by EYAD at 9/12/2022 1131                   Point: Home exercise program (Not Started)     Description:   Instruct learner(s) on appropriate technique for monitoring, assisting and/or progressing therapeutic exercises/activities.              Learner Progress:  Not documented in this visit.          Point: Precautions (In Progress)     Description:   Instruct learner(s) on prescribed precautions during self-care and functional transfers.              Learning Progress Summary           Patient Acceptance, E,D, NR by EYAD at 9/14/2022 1431    Acceptance, E,D, NR by EYAD at 9/12/2022 1131                   Point: Body mechanics (In Progress)     Description:   Instruct learner(s) on proper positioning and spine alignment during self-care, functional mobility activities and/or exercises.              Learning Progress Summary           Patient Acceptance, E,D, NR by EYAD at 9/14/2022 1431    Acceptance, E,D, NR by EYAD at 9/12/2022 1131                               User Key     Initials Effective Dates Name Provider Type Discipline    EYAD 06/16/21 -  Jessica Rm OT Occupational Therapist OT              OT Recommendation and Plan  Planned  Therapy Interventions (OT): activity tolerance training, adaptive equipment training, BADL retraining, functional balance retraining, occupation/activity based interventions, patient/caregiver education/training, ROM/therapeutic exercise, strengthening exercise, transfer/mobility retraining  Therapy Frequency (OT): daily  Plan of Care Review  Plan of Care Reviewed With: patient  Progress: no change  Outcome Evaluation: Pt alert and participatory in OT interventions. Required increased time to process commands/prompts/cues provided for improved mobility i.e. STS and SPT recliner > bed. Pt grossly req'd mod A for STS and SPT with FWW and for sitting > supine and scooting up in bed. Pt fatigued with more dynamic tasks and attempted to sit prior to readiness for safety at EOB. Pt presented with incontinent urine episode upon standing resulting in increased safety cues and dependent care for d/d socks and toileting hygiene. SUA for hand and face hygiene while sitting up in bed. Will progress pt in OT interventions as able while hospitalized. Continue to recommend IRF at d/c.     Time Calculation:    Time Calculation- OT     Row Name 09/14/22 1538             Time Calculation- OT    OT Start Time 1431  -JY      OT Received On 09/14/22  -JY      OT Goal Re-Cert Due Date 09/22/22  -JY              Timed Charges    01732 - OT Therapeutic Activity Minutes 15  -JY      83065 - OT Self Care/Mgmt Minutes 10  -JY              Total Minutes    Timed Charges Total Minutes 25  -JY       Total Minutes 25  -JY            User Key  (r) = Recorded By, (t) = Taken By, (c) = Cosigned By    Initials Name Provider Type    Jessica Higgins OT Occupational Therapist              Therapy Charges for Today     Code Description Service Date Service Provider Modifiers Qty    [id number]  OT THERAPEUTIC ACT EA 15 MIN 9/14/2022 Jessica Rm OT GO 1    [id number] HC OT SELF CARE/MGMT/TRAIN EA 15 MIN 9/14/2022 Jessica Rm OT GO 1                Jessica Rm, OT  9/14/2022

## 2022-09-14 NOTE — THERAPY TREATMENT NOTE
Patient Name: Nehal Parker  : 2/15/1926    MRN: [id number]                              Today's Date: 2022       Admit Date: 2022    Visit Dx:     ICD-10-CM ICD-9-CM   1. Acute ischemic right MCA stroke (HCC)  I63.511 434.91   2. Elevated blood pressure reading with diagnosis of hypertension  I10 401.9   3. DNR (do not resuscitate)  Z66 V49.86     Patient Active Problem List   Diagnosis   • Acute left-sided weakness   • Acute CVA (cerebrovascular accident) (HCC)   • HLD (hyperlipidemia)   • Essential hypertension   • Hypothyroidism (acquired)   • Mild depression     Past Medical History:   Diagnosis Date   • Depression    • Disease of thyroid gland    • Hypertension      History reviewed. No pertinent surgical history.   General Information     Row Name 22 17022       Physical Therapy Time and Intention    Document Type therapy note (daily note)  -CD therapy note (daily note)  -CD    Mode of Treatment physical therapy  -CD physical therapy  -CD    Row Name 22          General Information    Patient Profile Reviewed yes  -CD     Existing Precautions/Restrictions fall  baseline numbness at R 4th and 5th digits, [place], recent fall hx  -CD     Barriers to Rehab medically complex;previous functional deficit  -CD     Row Name 22          Living Environment    People in Home facility resident  [facility]  -     Row Name 22          Home Main Entrance    Number of Stairs, Main Entrance none  -CD     Row Name 22          Stairs Within Home, Primary    Number of Stairs, Within Home, Primary none  -CD     Row Name 22          Cognition    Orientation Status (Cognition) oriented x 4  -CD     Row Name 22          Safety Issues, Functional Mobility    Safety Issues Affecting Function (Mobility) safety precaution awareness;safety precautions follow-through/compliance;sequencing abilities;positioning of assistive device;insight into  deficits/self-awareness  -CD     Impairments Affecting Function (Mobility) balance;endurance/activity tolerance;postural/trunk control;strength;motor control  -CD     Cognitive Impairments, Mobility Safety/Performance insight into deficits/self-awareness;safety precaution awareness;safety precaution follow-through;sequencing abilities;awareness, need for assistance  -CD     Comment, Safety Issues/Impairments (Mobility) REQUIRES CUES FOR SAFETY WITH MOBILITY. FATIGUES QUICKLY AS RELIES HEAVILY ON R WALKER FOR SUPPPORT.  -CD           User Key  (r) = Recorded By, (t) = Taken By, (c) = Cosigned By    Initials Name Provider Type    CD Nica Trujillo, PT Physical Therapist               Mobility     Row Name 09/14/22 1706          Bed Mobility    Supine-Sit [place] (Bed Mobility) moderate assist (50% patient effort);verbal cues  -CD     Assistive Device (Bed Mobility) bed rails;head of bed elevated  -CD     Comment, (Bed Mobility) CUES TO UTILIZE BED RAIL TO ASSIST. MANUAL ASSIST TO SCOOT HIPS OUT TO EOB FOR FEET ON FLOOR.  -CD     Row Name 09/14/22 1706          Transfers    Comment, (Transfers) CUES FOR HAND PLACEMENT AND SEQUENCING USING R WALKER. STAND STEP PIVOT X 2 AND STS FROM EOB. PT DEPENDENT WITH HYGIENE AFTER TOILETING.  -CD     Row Name 09/14/22 1706          Bed-Chair Transfer    Bed-Chair [place] (Transfers) moderate assist (50% patient effort);verbal cues  -CD     Assistive Device (Bed-Chair Transfers) walker, front-wheeled  -CD     Row Name 09/14/22 1706          Sit-Stand Transfer    Sit-Stand [place] (Transfers) moderate assist (50% patient effort);2 person assist;verbal cues  -CD     Assistive Device (Sit-Stand Transfers) walker, front-wheeled  -CD     Row Name 09/14/22 1706          Gait/Stairs (Locomotion)    [place] Level (Gait) verbal cues;moderate assist (50% patient effort)  -CD     Assistive Device (Gait) walker, front-wheeled  -CD     Distance in Feet (Gait) 80 FEET  -CD      Deviations/Abnormal Patterns (Gait) stride length decreased;weight shifting decreased;gait speed decreased;base of support, narrow;scissoring  -CD     Bilateral Gait Deviations forward flexed posture;heel strike decreased  -CD     Comment, (Gait/Stairs) INTERMITTENT SCISSORING ON THE L WITH NARROW [place] THROUGHOUT. MANUAL ASSIST TO GUIDE R WALKER ESPECIALLY WHEN NEGOTIATING TURNS.  -CD           User Key  (r) = Recorded By, (t) = Taken By, (c) = Cosigned By    Initials Name Provider Type    CD Nica Trujillo PT Physical Therapist               Obj/Interventions     Row Name 09/14/22 1709          Motor Skills    Therapeutic Exercise --  COMPLETED SEATED B LE THER EX: LAQ, HIP FLEX, AP'S - CONTINUES WITH MILD L SIDE WEAKNESS.,  -CD     Row Name 09/14/22 1709          Balance    Static Sitting Balance supervision  -CD     Dynamic Sitting Balance contact guard  -CD     Position, Sitting Balance supported;sitting edge of bed  -CD     Static Standing Balance moderate assist;minimal assist  -CD     Dynamic Standing Balance moderate assist;minimal assist  -CD     Position/Device Used, Standing Balance supported;walker, front-wheeled  -CD     Comment, Balance SEE GAIT NOTE. HAS POSTERIOR LEAN INITIALLY UPON STANDING AND VERY [place] /SCISSORING WITH GAIT,.  -CD           User Key  (r) = Recorded By, (t) = Taken By, (c) = Cosigned By    Initials Name Provider Type     Nica Trujillo, PT Physical Therapist               Goals/Plan    No documentation.                Clinical Impression     Row Name 09/14/22 1711          Pain    Pretreatment Pain Rating 0/10 - no pain  -CD     Posttreatment Pain Rating 0/10 - no pain  -CD     Row Name 09/14/22 1711          Plan of Care Review    Plan of Care Reviewed With patient  -CD     Outcome Evaluation PT IS ALERT AND MOTIVATED TO WORK WITH THERAPY. FOLLOWS ALL COMMANDS BUT NEEDS MAX CUES FOR SAFETY WITH MOBILITY AND MANUAL ASSIST TO GUIDE R WALKER DURING GAIT DUE TO NARROW [place] AND  IMPAIRED BALANCE. PT AMBULATED 80 FEET WITH R WALKER AND MIN/MOD ASSIST. RECOMMEND IRF AT D/C FOR BEST OUTCOME.  -CD           User Key  (r) = Recorded By, (t) = Taken By, (c) = Cosigned By    Initials Name Provider Type    Nica Lira, PT Physical Therapist               Outcome Measures     Row Name 09/14/22 1713          How much help from another person do you currently need...    Turning from your back to your side while in flat bed without using bedrails? 2  -CD     Moving from lying on back to sitting on the side of a flat bed without bedrails? 2  -CD     Moving to and from a bed to a chair (including a wheelchair)? 2  -CD     Standing up from a chair using your arms (e.g., wheelchair, bedside chair)? 2  -CD     Climbing 3-5 steps with a railing? 1  -CD     To walk in hospital room? 2  -CD     AM-PAC 6 Clicks Score (PT) 11  -CD     Highest level of mobility 4 --> Transferred to chair/commode  -CD     Row Name 09/14/22 1713          Modified Moyers Scale    Modified Moyers Scale 4 - Moderately severe disability.  Unable to walk without assistance, and unable to attend to own bodily needs without assistance.  -CD     Row Name 09/14/22 1713 09/14/22 1536       Functional Assessment    Outcome Measure Options AM-PAC 6 Clicks Basic Mobility (PT);Modified Tucker  -CD AM-PAC 6 Clicks Daily Activity (OT)  -EYAD          User Key  (r) = Recorded By, (t) = Taken By, (c) = Cosigned By    Initials Name Provider Type    Nica Lira, PT Physical Therapist    Jessica Higgins OT Occupational Therapist                             Physical Therapy Education                 Title: PT OT SLP Therapies (In Progress)     Topic: Physical Therapy (Done)     Point: Mobility training (Done)     Learning Progress Summary           Patient Acceptance, E, VU by CD at 9/14/2022 1714    Comment: SEE FLOWSHEET    Acceptance, E,TB, VU by ES at 9/13/2022 1624                   Point: Home exercise program (Done)     Learning  Progress Summary           Patient Acceptance, E, VU by CD at 9/14/2022 1714    Comment: SEE FLOWSHEET    Acceptance, E,TB, VU by ES at 9/13/2022 1624                   Point: Body mechanics (Done)     Learning Progress Summary           Patient Acceptance, E, VU by CD at 9/14/2022 1714    Comment: SEE FLOWSHEET    Acceptance, E,TB, VU by ES at 9/13/2022 1624                   Point: Precautions (Done)     Learning Progress Summary           Patient Acceptance, E, VU by CD at 9/14/2022 1714    Comment: SEE FLOWSHEET    Acceptance, E,TB, VU by ES at 9/13/2022 1624                               User Key     Initials Effective Dates Name Provider Type Discipline    CD 06/16/21 -  Nica Trujillo PT Physical Therapist PT    ES 08/11/22 -  Svetlana Hicks PT Physical Therapist PT              PT Recommendation and Plan     Plan of Care Reviewed With: patient  Outcome Evaluation: PT IS ALERT AND MOTIVATED TO WORK WITH THERAPY. FOLLOWS ALL COMMANDS BUT NEEDS MAX CUES FOR SAFETY WITH MOBILITY AND MANUAL ASSIST TO GUIDE R WALKER DURING GAIT DUE TO [place] AND IMPAIRED BALANCE. PT AMBULATED 80 FEET WITH R WALKER AND MIN/MOD ASSIST. RECOMMEND IRF AT D/C FOR BEST OUTCOME.     Time Calculation:    PT Charges     Row Name 09/14/22 1714             Time Calculation    Start Time 1454  -CD      PT Received On 09/14/22  -CD      PT Goal Re-Cert Due Date 09/23/22  -CD              Time Calculation- PT    Total Timed Code Minutes- PT 41 minute(s)  -CD              Timed Charges    24373 - PT Therapeutic Exercise Minutes 11  -CD      30787 - Gait Training Minutes  15  -CD      31334 - PT Therapeutic Activity Minutes 15  -CD              Total Minutes    Timed Charges Total Minutes 41  -CD       Total Minutes 41  -CD            User Key  (r) = Recorded By, (t) = Taken By, (c) = Cosigned By    Initials Name Provider Type    CD Nica Trujillo PT Physical Therapist              Therapy Charges for Today     Code Description Service  Date Service Provider Modifiers Qty    [id number]  PT THER PROC EA 15 MIN 9/14/2022 Nica Trujillo, PT GP 1    [id number] HC GAIT TRAINING EA 15 MIN 9/14/2022 Nica Trujillo, PT GP 1    [id number]  PT THERAPEUTIC ACT EA 15 MIN 9/14/2022 Nica Trujillo, PT GP 1    [id number]  PT THER SUPP EA 15 MIN 9/14/2022 Nica Trujillo, PT GP 3          PT G-Codes  Outcome Measure Options: AM-PAC 6 Clicks Basic Mobility (PT), Modified Tucker  AM-PAC 6 Clicks Score (PT): 11  AM-PAC 6 Clicks Score (OT): 14  Modified Dodson Scale: 4 - Moderately severe disability.  Unable to walk without assistance, and unable to attend to own bodily needs without assistance.    Nica Trujillo, PT  9/14/2022

## 2022-09-14 NOTE — PLAN OF CARE
Goal Outcome Evaluation:  Plan of Care Reviewed With: patient        Progress: no change  Outcome Evaluation: Pt alert and participatory in OT interventions. Required increased time to process commands/prompts/cues provided for improved mobility i.e. STS and SPT recliner > bed. Pt grossly req'd mod A for STS and SPT with FWW and for sitting > supine and scooting up in bed. Pt fatigued with more dynamic tasks and attempted to sit prior to readiness for safety at EOB. Pt presented with incontinent urine episode upon standing resulting in increased safety cues and dependent care for d/d socks and toileting hygiene. SUA for hand and face hygiene while sitting up in bed. Will progress pt in OT interventions as able while hospitalized. Continue to recommend IRF at d/c.

## 2022-09-15 LAB
BACTERIA SPEC AEROBE CULT: ABNORMAL
DEPRECATED RDW RBC AUTO: 45.2 FL (ref 37–54)
ERYTHROCYTE [DISTWIDTH] IN BLOOD BY AUTOMATED COUNT: 14 % (ref 12.3–15.4)
HCT VFR BLD AUTO: 37 % (ref 34–46.6)
HGB BLD-MCNC: 12 G/DL (ref 12–15.9)
MCH RBC QN AUTO: 29.1 PG (ref 26.6–33)
MCHC RBC AUTO-ENTMCNC: 32.4 G/DL (ref 31.5–35.7)
MCV RBC AUTO: 89.6 FL (ref 79–97)
PLATELET # BLD AUTO: 211 10*3/MM3 (ref 140–450)
PMV BLD AUTO: 10.1 FL (ref 6–12)
RBC # BLD AUTO: 4.13 10*6/MM3 (ref 3.77–5.28)
WBC NRBC COR # BLD: 9.03 10*3/MM3 (ref 3.4–10.8)

## 2022-09-15 PROCEDURE — 99233 SBSQ HOSP IP/OBS HIGH 50: CPT | Performed by: NURSE PRACTITIONER

## 2022-09-15 PROCEDURE — 99232 SBSQ HOSP IP/OBS MODERATE 35: CPT | Performed by: PEDIATRICS

## 2022-09-15 PROCEDURE — 25010000002 VANCOMYCIN PER 500 MG

## 2022-09-15 PROCEDURE — 85027 COMPLETE CBC AUTOMATED: CPT | Performed by: PHYSICIAN ASSISTANT

## 2022-09-15 RX ORDER — VANCOMYCIN HYDROCHLORIDE 1 G/200ML
1000 INJECTION, SOLUTION INTRAVENOUS ONCE
Status: COMPLETED | OUTPATIENT
Start: 2022-09-15 | End: 2022-09-15

## 2022-09-15 RX ORDER — VANCOMYCIN 1 G/200ML
1000 INJECTION, SOLUTION INTRAVENOUS ONCE
Status: DISCONTINUED | OUTPATIENT
Start: 2022-09-15 | End: 2022-09-15

## 2022-09-15 RX ORDER — DOXYCYCLINE 100 MG/1
100 CAPSULE ORAL EVERY 12 HOURS SCHEDULED
Status: DISCONTINUED | OUTPATIENT
Start: 2022-09-16 | End: 2022-09-16 | Stop reason: HOSPADM

## 2022-09-15 RX ADMIN — LOSARTAN POTASSIUM 50 MG: 50 TABLET, FILM COATED ORAL at 09:01

## 2022-09-15 RX ADMIN — Medication 10 ML: at 22:01

## 2022-09-15 RX ADMIN — LEVOTHYROXINE SODIUM 112 MCG: 0.11 TABLET ORAL at 06:16

## 2022-09-15 RX ADMIN — ATORVASTATIN CALCIUM 40 MG: 40 TABLET, FILM COATED ORAL at 22:00

## 2022-09-15 RX ADMIN — VANCOMYCIN HYDROCHLORIDE 1000 MG: 1 INJECTION, SOLUTION INTRAVENOUS at 12:56

## 2022-09-15 RX ADMIN — AZTREONAM 1 G: 1 INJECTION, POWDER, LYOPHILIZED, FOR SOLUTION INTRAMUSCULAR; INTRAVENOUS at 06:15

## 2022-09-15 RX ADMIN — TERAZOSIN HYDROCHLORIDE 1 MG: 1 CAPSULE ORAL at 22:00

## 2022-09-15 RX ADMIN — ASPIRIN 81 MG CHEWABLE TABLET 81 MG: 81 TABLET CHEWABLE at 09:01

## 2022-09-15 RX ADMIN — SERTRALINE HYDROCHLORIDE 25 MG: 25 TABLET ORAL at 09:01

## 2022-09-15 RX ADMIN — PANTOPRAZOLE SODIUM 40 MG: 40 TABLET, DELAYED RELEASE ORAL at 06:16

## 2022-09-15 NOTE — PROGRESS NOTES
[facility] Medicine Services  PROGRESS NOTE    Patient Name: Nehal Parker  : 2/15/1926  MRN: [id number]    Date of Admission: 2022  Primary Care Physician: Brenton Clay MD    Subjective     CC: f/u stroke     HPI:  Sitting up in bed, eating breakfast.   Feeling well. No events overnight.     ROS:  Gen- No fevers, chills  CV- No chest pain, palpitations  Resp- No cough, dyspnea  GI- No N/V/D, abd pain    Objective     Vital Signs:   Temp:  [97.6 °F (36.4 °C)-98.3 °F (36.8 °C)] 97.6 °F (36.4 °C)  Heart Rate:  [56-70] 58  Resp:  [16-20] 16  BP: (119-139)/(63-79) 135/71     Physical Exam:  Constitutional: No acute distress.awake, interactive.  HENT: NCAT, mucous membranes moist  Respiratory: Clear to auscultation bilaterally, respiratory effort normal on rooma ir   Cardiovascular: RRR, no murmurs, rubs, or gallops  Gastrointestinal: Positive bowel sounds, soft, nontender, nondistended  Musculoskeletal: No bilateral ankle edema.    Psychiatric: Appropriate affect, cooperative  Neurologic: Oriented x 3, moves all extremities spontaneously without focal deficits, mild speech dysarthria    Results Reviewed:  LAB RESULTS:      Lab 09/15/22  0848 22  0509 22  0033 22  2143 22  0444 22  0756 22  1349 22  1344   WBC 9.03 10.58  --   --  12.15* 7.94 8.33  --    HEMOGLOBIN 12.0 12.4  --   --  13.8 13.0 13.2  --    HEMOGLOBIN, POC  --   --   --   --   --   --   --  14.3   HEMATOCRIT 37.0 38.9  --   --  42.5 39.5 40.4  --    HEMATOCRIT POC  --   --   --   --   --   --   --  42   PLATELETS 211 239  --   --  300 265 282  --    NEUTROS ABS  --  8.43*  --   --  10.28*  --  6.46  --    IMMATURE GRANS (ABS)  --  0.03  --   --  0.04  --  0.02  --    LYMPHS ABS  --  0.99  --   --  0.89  --  1.20  --    MONOS ABS  --  0.78  --   --  0.70  --  0.44  --    EOS ABS  --  0.30  --   --  0.19  --  0.15  --    MCV 89.6 89.2  --   --  88.4 86.4 87.4  --    LACTATE   --   --  1.7 2.1*  --   --   --   --    PROTIME  --   --   --   --   --   --   --  15.0   APTT  --   --   --   --   --   --  31.4  --          Lab 09/14/22  0509 09/13/22  0444 09/12/22  0756 09/11/22  1344   SODIUM 136 135* 136  --    POTASSIUM 4.4 4.1 4.1  --    CHLORIDE 103 101 99  --    CO2 23.0 20.0* 25.0  --    ANION GAP 10.0 14.0 12.0  --    BUN 15 12 12  --    CREATININE 0.63 0.57 0.56* 0.60   EGFR 81.3 83.3 83.6 82.3   GLUCOSE 99 124* 95  --    CALCIUM 9.4 9.9* 10.0*  --    HEMOGLOBIN A1C  --   --  5.90*  --    TSH  --   --  1.570  --          Lab 09/12/22  0756 09/11/22  1349   TOTAL PROTEIN 7.0  --    ALBUMIN 4.20  --    GLOBULIN 2.8  --    ALT (SGPT) 10 11   AST (SGOT) 28 23   BILIRUBIN 0.5  --    ALK PHOS 97  --          Lab 09/11/22  1349 09/11/22  1344   TROPONIN T <0.010  --    PROTIME  --  15.0   INR  --  1.3*         Lab 09/12/22  0756   CHOLESTEROL 205*   LDL CHOL 127*   HDL CHOL 60   TRIGLYCERIDES 103     Brief Urine Lab Results  (Last result in the past 365 days)      Color   Clarity   Blood   Leuk Est   Nitrite   Protein   CREAT   Urine HCG        09/13/22 1424 Red   Cloudy   Large (3+)   Large (3+)   Negative   100 mg/dL (2+)               Microbiology Results Abnormal     Procedure Component Value - Date/Time    Blood Culture - Blood, Arm, Right [[id number]]  (Normal) Collected: 09/13/22 2145    Lab Status: Preliminary result Specimen: Blood from Arm, Right Updated: 09/14/22 2217     Blood Culture No growth at 24 hours    Blood Culture - Blood, Hand, Right [[id number]]  (Normal) Collected: 09/13/22 2150    Lab Status: Preliminary result Specimen: Blood from Hand, Right Updated: 09/14/22 2217     Blood Culture No growth at 24 hours        EEG    Result Date: 9/13/2022  Reason for referral: 89 y.o.female with altered mental status, decreased level of consciousness Technical Summary:  A 19 channel digital EEG was performed using the international 10-20 placement system, including eye leads and  EKG leads. Duration: 20 minutes Findings: When the patient is awake, medium amplitude theta is seen over the left hemisphere, rest by contrast slower medium amplitude 2 to 4 Hz delta is seen with underlying theta present.  As a study proceeds, intermittent rhythmic delta activity is seen over the bilateral frontal regions, with greater prominence on the right.  Sleep is seen with slowing of the background; independent right hemispheric slowing persists.  No epileptiform activity is seen.  Photic stimulation does not change the background.  Hyperventilation is not performed. Video: On Technical quality: Superior EK to 70 bpm SUMMARY: Mild generalized slow with independent rhythmic frontal dominant delta present Independent right hemispheric slow, moderate No epileptiform activity seen     Impression: Focal cerebral dysfunction primarily impacting the right hemisphere Underlying mild diffuse cerebral dysfunction No evidence for epilepsy is seen This report is transcribed using the Dragon dictation system.      CT Head Without Contrast    Result Date: 2022  CT HEAD WO CONTRAST-  Date of Exam: 2022 12:51 PM  Indication: Stroke, follow up; I63.511-Cerebral infarction due to unspecified occlusion or stenosis of right middle cerebral artery; I10-Essential (primary) hypertension; Z66-Do not resuscitate.  Comparison: MRI brain without and with contrast 2022.  Technique:  Without contrast, contiguous axial CT images of the head were obtained from skull base to vertex.  Coronal and sagittal reconstructions were performed.  Automated exposure control and iterative reconstruction methods were used.  FINDINGS Hypodensity is seen within the cortical-subcortical right frontal lobe and anterior right temporal lobe consistent with the recent infarct documented on yesterday's MRI brain. There is no indication of hemorrhagic transformation. No additional infarct is seen.  There is mild generalized atrophy with  compensatory prominence of the ventricles and extra-axial spaces. Mild scattered deep white matter hypodensities are favored to reflect changes of chronic microvascular disease. There is a chronic lacunar infarct within the right basal ganglia.  No acute calvarial abnormality. Minor ethmoid sinus mucosal thickening. Mastoid air cells are clear. Mild intracranial carotid artery calcifications.      Impression:  1. Right frontal/anterior temporal lobe cortical/subcortical acute/subacute infarct, corresponding to yesterday's MRI brain. The distribution of infarct is not thought to be significantly changed, and there is no indication of hemorrhagic transformation. 2. Atrophy. 3. Mild scattered chronic microvascular disease.  This report was finalized on 9/13/2022 1:06 PM by Debbie Otto MD.      CT Abdomen Pelvis Stone Protocol    Result Date: 9/14/2022  DATE OF EXAM: 9/13/2022 9:22 PM  PROCEDURE: CT ABDOMEN PELVIS STONE PROTOCOL-  INDICATIONS: hematuria, UTI. stone suspected; I63.511-Cerebral infarction due to unspecified occlusion or stenosis of right middle cerebral artery; I10-Essential (primary) hypertension; Z66-Do not resuscitate  COMPARISON: No comparisons available.  TECHNIQUE: Routine transaxial slices were obtained through the abdomen and pelvis without the administration of intravenous contrast. Reconstructed coronal and sagittal images were also obtained. Automated exposure control and iterative reconstruction methods were used.  The radiation dose reduction device was turned on for each scan per the ALARA (As Low as Reasonably Achievable) protocol.  FINDINGS: The exam is somewhat limited owing to respiratory motion artifact. Evaluation of the lower pelvis is somewhat limited owing to extensive streak artifact and beam hardening owing to bilateral hip hardware.  There are chronic interstitial changes in the partially imaged lung bases without focal consolidation. Mitral annular calcifications and ectatic  ascending thoracic aorta are noted. A few circumscribed small hepatic hypodensities are noted, likely hepatic cyst. Otherwise unremarkable appearance of the liver. Unremarkable appearance of the gallbladder and bile ducts. Normal appearance of the spleen. There is fatty infiltration of the pancreas without pancreatic ductal dilatation. Normal appearance of the adrenal glands. Unremarkable noncontrast appearance of the kidneys. No evidence of hydronephrosis. No definite nephrolithiasis. No perinephric fat stranding or fluid. Normal appearance of the ureters without evidence of ureteral stone, noting that the distal-most ureters at the ureterovesical junctions are not well imaged owing to extensive streak artifact. Partially imaged portions of the urinary bladder appear grossly unremarkable. The uterus surgically absent. There is a 3.1 cm thin-walled cystic structure in the left pelvis adjacent to the left iliac vessels, possibly ovarian cyst. There is inspissated stool in the rectum. There is sigmoid colonic diverticulosis without evidence of diverticulitis. There is a large hiatal hernia, incompletely visualized. No evidence of bowel obstruction or definite inflammatory change of the GI tract. Moderate colonic stool burden. No abdominopelvic free fluid or conspicuous fat stranding. No pneumoperitoneum. There is atherosclerosis and mild ectasia of the abdominal aorta without aneurysm. No lymphadenopathy. The body wall soft tissues appear unremarkable. The bones are severely demineralized. No definite acute osseous findings. There are degenerative changes throughout the imaged spine. There is partial visualization of bilateral total hip arthroplasties.      Impression: No findings to explain hematuria. Specifically, no evidence of nephrolithiasis.  Large hiatal hernia.  Moderate colonic stool burden with inspissated stool in the rectum compatible constipation.  Colonic diverticulosis without diverticulitis.  This  report was finalized on 9/14/2022 12:05 AM by Cedrcik Haq MD.      Results for orders placed during the hospital encounter of 09/11/22    Adult Transthoracic Echo Complete W/ Cont if Necessary Per Protocol (With Agitated Saline)    Interpretation Summary  · Left ventricular wall thickness is consistent with mild concentric hypertrophy.  · Left ventricular ejection fraction appears to be 56 - 60%.  · Left atrial volume is mildly increased.  · There is calcification of the aortic valve.  · Mild dilation of the aortic root is present. Mild dilation of the sinuses of Valsalva is present. Mild dilation of the ascending aorta is present.  · MAC and calcification of subvalvular structures but no significant MS  · Mild MR    I have reviewed the medications:  Scheduled Meds:aspirin, 81 mg, Oral, Daily  atorvastatin, 40 mg, Oral, Nightly  levothyroxine, 112 mcg, Oral, Q AM  losartan, 50 mg, Oral, Q24H  pantoprazole, 40 mg, Oral, QAM  sertraline, 25 mg, Oral, Daily  sodium chloride, 10 mL, Intravenous, Q12H  sodium chloride, 10 mL, Intravenous, Q12H  terazosin, 1 mg, Oral, Nightly  [START ON 9/16/2022] vancomycin (dosing per levels), , Does not apply, Daily  vancomycin, 1,000 mg, Intravenous, Once      Continuous Infusions:Pharmacy to dose vancomycin,       PRN Meds:.•  acetaminophen **OR** [DISCONTINUED] acetaminophen **OR** [DISCONTINUED] acetaminophen  •  hydrALAZINE  •  ondansetron **OR** ondansetron  •  Pharmacy to dose vancomycin  •  sodium chloride  •  sodium chloride  •  sodium chloride    Assessment & Plan     Active Hospital Problems    Diagnosis  POA   • Acute left-sided weakness [R53.1]  Yes   • Acute CVA (cerebrovascular accident) (HCC) [I63.9]  Yes   • HLD (hyperlipidemia) [E78.5]  Yes   • Essential hypertension [I10]  Yes   • Hypothyroidism (acquired) [E03.9]  Yes   • Mild depression [F32.A]  Yes      Resolved Hospital Problems   No resolved problems to display.     Brief Hospital Course to date:  Nehal  Elena is a 89 y.o. female with past medical history of essential hypertension, dyslipidemia, hypothyroidism, depression, TIA, history of PE who was recently taken off Xarelto (that she was taking for recurrent PE) who presented to the hospital with left-sided weakness, left facial droop and slurred speech.  She was found to have acute stroke but was not candidate for tPA (outside the window) no revascularization because of her age.       Acute right MCA ischemic stroke  - Appreciate stroke neurology assistance  - Not candidate for tPA (outside the window) / not candidate for intervention/revascularization (age)  - Symptoms (slurred speech, L facial droop and left-sided weakness) are improving  - CTA head/neck showed multiple areas of high-grade narrowing or occlusion within the distal M2 branches of the right MCA. Multiple focal high-grade stenoses in both posterior cerebral arteries  - CT cerebral perfusion: 27 mL brain risk in the right MCA distribution with 12 mL central core infarct  - MRI brain confirmed acute right frontotemporal region infarct  - Continue ASA / high intensity statin  - PT/ OT following. OT recommends rehab. Patient prefers to return to her assisted living facility but [facility] wants her to go to rehab and she has agreed to referral to [facility]  - SLP following - on regular diet    MRSA UTI- cystitis.  Unable to determine if present on admission  -Blood cx neg and no other clear source of infeciton  -sensitive to doxycycline, without any signs of pyelo on CT Abd/Pelvis.  Treat for cystitis    Hypertensive emergency, resolved  - BP improved  - Continue Losartan 50mg daily, add Terazosin 1mg QHS      Hematuria, improved  - UA concerning for UTI. Follow culture  - Due to hematuria, CT stone protocol obtained and was negative for nephrolithiasis   - PCN allergic. Per records from [facility] (2019) she has anaphylaxis to PCN. I attempted to discuss with patient but she does not know what her reaction to PCN  is.   - d/c Aztreonam. Transition to PO abx when able    Hypothyroidism  - Continue Levothyroxine     Dyslipidemia  -   - On Lipitor 40mg daily given her age     Depression  - Continue Sertraline      History of recurrent PE  - Recently taken off Xarelto (unclear reason)  - Holding off on anticoagulation given the size of her acute stroke    Expected Discharge Location and Transportation: rehab via EMS or medical transport van   Expected Discharge Date: 9/17/22     DVT prophylaxis:  Mechanical DVT prophylaxis orders are present.     AM-PAC 6 Clicks Score (PT): 11 (09/14/22 2000)    CODE STATUS:   Code Status and Medical Interventions:   Ordered at: 09/11/22 [id number]     Level Of Support Discussed With:    Patient     Code Status (Patient has no pulse and is not breathing):    No CPR (Do Not Attempt to Resuscitate)     Medical Interventions (Patient has pulse or is breathing):    Full Support     Anahy Lu MD  09/15/22

## 2022-09-15 NOTE — PROGRESS NOTES
Stroke Progress Note       Chief Complaint:  Left sided weakness    Subjective    Subjective     Subjective:  Patient is much more alert and interactive today; her eyes are open she is alert and oriented x3.  Following commands.  States she feels much better but is a little confused as to why she is in the hospital    Review of Systems   Unable to perform ROS: Mental status change   Constitutional: Negative for chills and fever.   HENT: Negative for voice change.    Eyes: Negative for visual disturbance.   Respiratory: Positive for cough. Negative for stridor.    Cardiovascular: Negative for chest pain.   Gastrointestinal: Negative for nausea.   Musculoskeletal: Positive for gait problem.   Neurological: Positive for facial asymmetry, speech difficulty, weakness and numbness.   Psychiatric/Behavioral: Positive for confusion.            Objective    Objective      Temp:  [97.6 °F (36.4 °C)-98.3 °F (36.8 °C)] 97.6 °F (36.4 °C)  Heart Rate:  [56-70] 58  Resp:  [16-20] 16  BP: (119-139)/(63-79) 135/71        Neurological Exam  Mental Status  Awake, alert and oriented to person, place and time.Alert. Oriented to person, place, and time. Mild dysarthria present. Mixed aphasia present. Improving. Able to name objects.    Cranial Nerves  CN II: Visual fields full to confrontation.  CN III, IV, VI: Extraocular movements intact bilaterally. Normal lids and orbits bilaterally. Pupils equal round and reactive to light bilaterally.  CN V: Facial sensation is normal.  CN VII:  Right: There is no facial weakness.  Left: There is central facial weakness.  CN XI: Shoulder shrug strength is normal.  CN XII: Tongue midline without atrophy or fasciculations.    Motor  Normal muscle bulk throughout. No fasciculations present. Normal muscle tone. Strength is 5/5 in all four extremities except as noted.  LLE 4/5.    Sensory  Light touch abnormality:   Decreased sensation to light touch in the left hand as compared to right.    Reflexes                                             Right                      Left  Plantar                           Downgoing                Downgoing    Coordination  Right: Finger-to-nose normal.Left: Finger-to-nose normal.    Gait    Not tested.      Physical Exam  Vitals and nursing note reviewed.   Constitutional:       Appearance: Normal appearance.   HENT:      Head: Normocephalic and atraumatic.   Eyes:      General: Lids are normal.      Extraocular Movements: Extraocular movements intact.      Pupils: Pupils are equal, round, and reactive to light.   Cardiovascular:      Rate and Rhythm: Normal rate.   Pulmonary:      Effort: Pulmonary effort is normal. No respiratory distress.   Musculoskeletal:      Cervical back: Normal range of motion and neck supple.      Right lower leg: No edema.      Left lower leg: No edema.   Skin:     General: Skin is warm and dry.   Neurological:      Mental Status: She is alert and oriented to person, place, and time.      Cranial Nerves: Cranial nerve deficit and dysarthria present.      Sensory: Sensory deficit present.      Motor: Weakness present.   Psychiatric:         Mood and Affect: Mood normal.         Behavior: Behavior normal.         Results Review:    I reviewed the patient's new clinical results.  WBC   Date Value Ref Range Status   09/15/2022 9.03 3.40 - 10.80 10*3/mm3 Final     RBC   Date Value Ref Range Status   09/15/2022 4.13 3.77 - 5.28 10*6/mm3 Final     Hemoglobin   Date Value Ref Range Status   09/15/2022 12.0 12.0 - 15.9 g/dL Final     Hematocrit   Date Value Ref Range Status   09/15/2022 37.0 34.0 - 46.6 % Final     MCV   Date Value Ref Range Status   09/15/2022 89.6 79.0 - 97.0 fL Final     MCH   Date Value Ref Range Status   09/15/2022 29.1 26.6 - 33.0 pg Final     MCHC   Date Value Ref Range Status   09/15/2022 32.4 31.5 - 35.7 g/dL Final     RDW   Date Value Ref Range Status   09/15/2022 14.0 12.3 - 15.4 % Final     RDW-SD   Date Value Ref Range Status    09/15/2022 45.2 37.0 - 54.0 fl Final     MPV   Date Value Ref Range Status   09/15/2022 10.1 6.0 - 12.0 fL Final     Platelets   Date Value Ref Range Status   09/15/2022 211 140 - 450 10*3/mm3 Final     Neutrophil %   Date Value Ref Range Status   09/14/2022 79.6 (H) 42.7 - 76.0 % Final     Lymphocyte %   Date Value Ref Range Status   09/14/2022 9.4 (L) 19.6 - 45.3 % Final     Monocyte %   Date Value Ref Range Status   09/14/2022 7.4 5.0 - 12.0 % Final     Eosinophil %   Date Value Ref Range Status   09/14/2022 2.8 0.3 - 6.2 % Final     Basophil %   Date Value Ref Range Status   09/14/2022 0.5 0.0 - 1.5 % Final     Immature Grans %   Date Value Ref Range Status   09/14/2022 0.3 0.0 - 0.5 % Final     Neutrophils, Absolute   Date Value Ref Range Status   09/14/2022 8.43 (H) 1.70 - 7.00 10*3/mm3 Final     Lymphocytes, Absolute   Date Value Ref Range Status   09/14/2022 0.99 0.70 - 3.10 10*3/mm3 Final     Monocytes, Absolute   Date Value Ref Range Status   09/14/2022 0.78 0.10 - 0.90 10*3/mm3 Final     Eosinophils, Absolute   Date Value Ref Range Status   09/14/2022 0.30 0.00 - 0.40 10*3/mm3 Final     Basophils, Absolute   Date Value Ref Range Status   09/14/2022 0.05 0.00 - 0.20 10*3/mm3 Final     Immature Grans, Absolute   Date Value Ref Range Status   09/14/2022 0.03 0.00 - 0.05 10*3/mm3 Final     nRBC   Date Value Ref Range Status   09/14/2022 0.0 0.0 - 0.2 /100 WBC Final     Lab Results   Component Value Date    GLUCOSE 99 09/14/2022    BUN 15 09/14/2022    CREATININE 0.63 09/14/2022    BCR 23.8 09/14/2022    K 4.4 09/14/2022    CO2 23.0 09/14/2022    CALCIUM 9.4 09/14/2022    ALBUMIN 4.20 09/12/2022    AST 28 09/12/2022    ALT 10 09/12/2022     Hgb A1c 5.9  Lipid panel:  , HDL 60, , triglycerides 103      EEG    Result Date: 9/13/2022  Focal cerebral dysfunction primarily impacting the right hemisphere Underlying mild diffuse cerebral dysfunction No evidence for epilepsy is seen This report is  transcribed using the Dragon dictation system.      CT Head Without Contrast    Result Date: 9/13/2022   1. Right frontal/anterior temporal lobe cortical/subcortical acute/subacute infarct, corresponding to yesterday's MRI brain. The distribution of infarct is not thought to be significantly changed, and there is no indication of hemorrhagic transformation. 2. Atrophy. 3. Mild scattered chronic microvascular disease.  This report was finalized on 9/13/2022 1:06 PM by Debbie Otto MD.      CT Abdomen Pelvis Stone Protocol    Result Date: 9/14/2022  No findings to explain hematuria. Specifically, no evidence of nephrolithiasis.  Large hiatal hernia.  Moderate colonic stool burden with inspissated stool in the rectum compatible constipation.  Colonic diverticulosis without diverticulitis.  This report was finalized on 9/14/2022 12:05 AM by Cedrick Haq MD.        Results for orders placed during the hospital encounter of 09/11/22    Adult Transthoracic Echo Complete W/ Cont if Necessary Per Protocol (With Agitated Saline)    Interpretation Summary  · Left ventricular wall thickness is consistent with mild concentric hypertrophy.  · Left ventricular ejection fraction appears to be 56 - 60%.  · Left atrial volume is mildly increased.  · There is calcification of the aortic valve.  · Mild dilation of the aortic root is present. Mild dilation of the sinuses of Valsalva is present. Mild dilation of the ascending aorta is present.  · MAC and calcification of subvalvular structures but no significant MS  · Mild MR            Assessment/Plan     Assessment/Plan: 89-year-old female with a PMH significant for TIA, PE, HTN, HLD, depression, and hypothyroidism who presents to the [facility] ED with complaints of left facial droop, left-sided weakness and slurred speech.  Initial NIH 6.  CT head negative for acute abnormality.  CT perfusion revealed perfusion deficit in the right MCA territory (27 mL of ischemic penumbra with 12 mL  of corresponding core infarct.  CTA head and neck revealed multiple areas of high-grade stenosis and occlusion in the distal M2 segment of the right MCA as well as multiple areas of high-grade narrowing in the bilateral PCAs.  Not a candidate for IV thrombolytics secondary to last known well > 4.5 hours.  Not a candidate for endovascular therapy due to multiple comorbidities with baseline modified Tucker score of 3.    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    -Echo shows an EF of 60%, mild LAE, no bubble study due to age protocol  -Recommend holding off on starting anticoagulation for at least the next 2 weeks given size of infarct and microhemorrhage noted on MRI.  Will discuss plans for possible OAC in the future with the patient's daughters.  -cardiac monitor at discharge  -Had an episode of hematuria, improving.  Is being treated for UTI  -H&H is stable at 12/37  -EEG with focal cerebral dysfunction in the right hemisphere, mild diffuse cerebral dysfunction, and no epileptiform activity seen.  -Continue aspirin 81 mg daily  -Continue PT/OT; PT recommending IRF at discharge (patient is a resident of [facility] and prefers to return there), [facility]?  -CM following for discharge needs  -Patient will follow-up in the stroke clinic in 2 weeks    2.  HTN  -Normal blood pressure parameters  -Goal less than 130/80  -Management per primary team    3.  HLD  Lipid panel:  , HDL 60, ,  triglycerides 103  -Goal LDL <70 secondary stroke prevention  -Continue atorvastatin 40 mg daily    4.  Depression  -Continue sertraline    *Discussed plan of care with patient's daughter Lashae.  We discussed the likelihood that the patient stroke is cardioembolic and the treatment would ultimately be to start her on anticoagulation in a couple of weeks once her stroke has had a little more time to heal.  She told me that the patient lives in assisted living and is essentially on her own, she falls about once a week.  Given her age and high risk of falls I do not believe that anticoagulation would be in her best interest.  We agreed to get a cardiac monitor and reassess in a couple of weeks in clinic.  This will be a continued risk-benefit discussion for which I anticipate the risk of patient being on anticoagulation will outweigh the benefit.  Okay to discharge to see [facility] with follow-up in 2 weeks.    ZEYNEP Mena, AGACNP-BC  09/15/22  11:09 EDT

## 2022-09-15 NOTE — CASE MANAGEMENT/SOCIAL WORK
Case Management Discharge Note      Final Note: Per [place] in Admissions, they can offer Ms. Parker an acute bed at [facility] tomorrow. I met with Ms. Parker at the bedside and notified her and she is agreeable to this plan. She will be transferring to the CVA unit. Nurse to call report to . She will be transported by [facility] van at 1130. They will pick her up at the [address].         Selected Continued Care - Admitted Since 9/11/2022     Destination Coordination complete.    Service Provider Selected Services Address Phone Fax Patient Preferred    [facility]  Inpatient Rehabilitation [address] [id number] [phone] --          Durable Medical Equipment    No services have been selected for the patient.              Dialysis/Infusion    No services have been selected for the patient.              Home Medical Care    No services have been selected for the patient.              Therapy    No services have been selected for the patient.              Community Resources    No services have been selected for the patient.              Community & DME    No services have been selected for the patient.                  Transportation Services  W/C Van:  ([facility])    Final Discharge Disposition Code: 62 - inpatient rehab facility

## 2022-09-15 NOTE — PROGRESS NOTES
"Pharmacy Consult-Vancomycin Dosing  Nehal Parker is a  89 y.o. female receiving vancomycin therapy.     Indication: UTI  Consulting Provider: Hospitalist  ID Consult: No    Goal AUC: 400 - 600 mg/L*hr    Current Antimicrobial Therapy  Anti-Infectives (From admission, onward)      Ordered     Dose/Rate Route Frequency Start Stop    09/15/22 0831  vancomycin (dosing per levels)        Ordering Provider: Anahy Lu MD     Does not apply Daily 09/16/22 0900 09/21/22 0859    09/15/22 0831  vancomycin 1000 mg/200 mL 0.9% NS IVPB        Ordering Provider: Anahy Lu MD    1,000 mg  over 60 Minutes Intravenous Once 09/15/22 0930      09/15/22 0831  Pharmacy to dose vancomycin        Ordering Provider: Anahy Lu MD     Does not apply Continuous PRN 09/15/22 0821 09/20/22 0820    09/13/22 1939  aztreonam (AZACTAM) 2 g/100 mL 0.9% NS (mbp)        Ordering Provider: Belkys Hanson PA-C    2 g  over 30 Minutes Intravenous Once 09/13/22 2030 09/13/22 2234            Allergies  Allergies as of 09/11/2022 - Reviewed 09/11/2022   Allergen Reaction Noted    Levaquin [levofloxacin] Other (See Comments) 01/02/2019    Penicillins Other (See Comments) 01/02/2019    Sulfa antibiotics Other (See Comments) 01/02/2019       Labs    Results from last 7 days   Lab Units 09/14/22  0509 09/13/22  0444 09/12/22  0756   BUN mg/dL 15 12 12   CREATININE mg/dL 0.63 0.57 0.56*       Results from last 7 days   Lab Units 09/14/22  0509 09/13/22  0444 09/12/22  0756   WBC 10*3/mm3 10.58 12.15* 7.94       Evaluation of Dosing     Last Dose Received in the ED/Outside Facility: None  Is Patient on Dialysis or Renal Replacement: No    Ht - 160 cm (62.99\")  Wt - 60.2 kg (132 lb 11.5 oz)    Estimated Creatinine Clearance: 49.6 mL/min (by C-G formula based on SCr of 0.63 mg/dL).    Intake & Output (last 3 days)         09/12 0701  09/13 0700 09/13 0701  09/14 0700 09/14 0701  09/15 0700 09/15 0701 09/16 0700 "    P.O. 240       I.V. (mL/kg)  681 (11.3)      IV Piggyback  700 100     Total Intake(mL/kg) 240 (4) 1381 (22.9) 100 (1.7)     Urine (mL/kg/hr) 1500 (1)  300 (0.2) 600 (6.5)    Stool   0     Total Output 1500  300 600    Net -1260 +1381 -200 -600            Urine Unmeasured Occurrence 1 x 1 x 1 x     Stool Unmeasured Occurrence   1 x             Microbiology and Radiology  Microbiology Results (last 10 days)       Procedure Component Value - Date/Time    Blood Culture - Blood, Hand, Right [[id number]]  (Normal) Collected: 09/13/22 2150    Lab Status: Preliminary result Specimen: Blood from Hand, Right Updated: 09/14/22 2217     Blood Culture No growth at 24 hours    Blood Culture - Blood, Arm, Right [[id number]]  (Normal) Collected: 09/13/22 2145    Lab Status: Preliminary result Specimen: Blood from Arm, Right Updated: 09/14/22 2217     Blood Culture No growth at 24 hours    Urine Culture - Urine, Urine, Clean Catch [[id number]]  (Abnormal) Collected: 09/13/22 1424    Lab Status: Preliminary result Specimen: Urine, Clean Catch Updated: 09/15/22 0559     Urine Culture >100,000 CFU/mL Gram Positive Cocci    Narrative:      Colonization of the urinary tract without infection is common. Treatment is discouraged unless the patient is symptomatic, pregnant, or undergoing an invasive urologic procedure.            Reported Vancomycin Levels                         Insight[employer] AUC Calculation:    Current AUC: --- mg/L*hr    Predicted Steady State AUC on Current Dose: --- mg/L*hr  _________________________________    Predicted Steady State AUC on New Dose: 409 mg/L*hr    Assessment/Plan:  Pharmacy to dose vancomycin for UTI.  Goal AUC: 400-600 mg/L*hr  Patient to receive dose of vancomycin 1000 mg (~16.5 mg/kg) IV on 9/15 at 0930.   Due to age, renal function, and indication, will initiate vancomycin dosing per levels.  Assess clearance by obtaining vancomycin random level on 9/16 at 0600.  Monitor cultures and sensitivities,  renal function and clinical status.  Pharmacy will continue to follow and adjust regimen as necessary.    Thank you,  Alex Sandoval, [facility]   Pharmacy Resident   9/15/2022  08:35 EDT

## 2022-09-16 VITALS
TEMPERATURE: 98 F | DIASTOLIC BLOOD PRESSURE: 70 MMHG | SYSTOLIC BLOOD PRESSURE: 112 MMHG | OXYGEN SATURATION: 98 % | WEIGHT: 132.72 LBS | RESPIRATION RATE: 16 BRPM | HEIGHT: 63 IN | HEART RATE: 57 BPM | BODY MASS INDEX: 23.52 KG/M2

## 2022-09-16 PROBLEM — N30.90 CYSTITIS: Status: ACTIVE | Noted: 2022-09-16

## 2022-09-16 PROCEDURE — 99239 HOSP IP/OBS DSCHRG MGMT >30: CPT | Performed by: PEDIATRICS

## 2022-09-16 PROCEDURE — 92507 TX SP LANG VOICE COMM INDIV: CPT

## 2022-09-16 RX ORDER — TERAZOSIN 1 MG/1
1 CAPSULE ORAL NIGHTLY
Qty: 90 CAPSULE | Refills: 0 | Status: SHIPPED | OUTPATIENT
Start: 2022-09-16

## 2022-09-16 RX ORDER — ASPIRIN 81 MG/1
81 TABLET, CHEWABLE ORAL DAILY
Qty: 30 TABLET | Refills: 0 | Status: SHIPPED | OUTPATIENT
Start: 2022-09-16

## 2022-09-16 RX ORDER — LOSARTAN POTASSIUM 50 MG/1
50 TABLET ORAL
Qty: 90 TABLET | Refills: 0 | Status: SHIPPED | OUTPATIENT
Start: 2022-09-16

## 2022-09-16 RX ORDER — ATORVASTATIN CALCIUM 40 MG/1
40 TABLET, FILM COATED ORAL NIGHTLY
Qty: 90 TABLET | Refills: 0 | Status: SHIPPED | OUTPATIENT
Start: 2022-09-16

## 2022-09-16 RX ORDER — DOXYCYCLINE 100 MG/1
100 CAPSULE ORAL EVERY 12 HOURS SCHEDULED
Qty: 8 CAPSULE | Refills: 0 | Status: SHIPPED | OUTPATIENT
Start: 2022-09-16 | End: 2022-09-20

## 2022-09-16 RX ADMIN — LOSARTAN POTASSIUM 50 MG: 50 TABLET, FILM COATED ORAL at 09:08

## 2022-09-16 RX ADMIN — PANTOPRAZOLE SODIUM 40 MG: 40 TABLET, DELAYED RELEASE ORAL at 09:08

## 2022-09-16 RX ADMIN — SERTRALINE HYDROCHLORIDE 25 MG: 25 TABLET ORAL at 09:08

## 2022-09-16 RX ADMIN — ASPIRIN 81 MG CHEWABLE TABLET 81 MG: 81 TABLET CHEWABLE at 09:08

## 2022-09-16 RX ADMIN — LEVOTHYROXINE SODIUM 112 MCG: 0.11 TABLET ORAL at 09:08

## 2022-09-16 RX ADMIN — DOXYCYCLINE 100 MG: 100 CAPSULE ORAL at 09:08

## 2022-09-16 NOTE — DISCHARGE SUMMARY
[facility] Medicine Services  DISCHARGE SUMMARY    Patient Name: Nehal Parker  : 2/15/1926  MRN: [id number]    Date of Admission: 2022  1:24 PM  Date of Discharge:  2022  Primary Care Physician: Brenton Clay MD    Consults     Date and Time Order Name Status Description    2022  1:25 PM Inpatient Neurology Consult Stroke Completed           Hospital Course     Presenting Problem:   Acute left-sided weakness [R53.1]  Acute CVA (cerebrovascular accident) (HCC) [I63.9]    Active Hospital Problems    Diagnosis  POA   • Cystitis [N30.90]  Yes   • Acute left-sided weakness [R53.1]  Yes   • Acute CVA (cerebrovascular accident) (HCC) [I63.9]  Yes   • HLD (hyperlipidemia) [E78.5]  Yes   • Essential hypertension [I10]  Yes   • Hypothyroidism (acquired) [E03.9]  Yes   • Mild depression [F32.A]  Yes      Resolved Hospital Problems   No resolved problems to display.          Hospital Course:  Nehal Parker is a 89 y.o. 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  She was found to have acute stroke but was not candidate for tPA (outside the window) no revascularization because of her age.    CT head and neck showed multiple areas of high-grade narrowing in the right MCA as well as multiple focal high-grade stenosis in both cerebral arteries.  MRI confirmed an acute right frontotemporal infarct.  She will require aspirin and high intensity statin upon discharge.  Has been working with therapy during her hospital stay.  Blood pressure was controlled with losartan and terazosin.  Patient did develop some hematuria for which had a CT stone protocol which was negative for nephrolithiasis.  Culture grew MRSA, without any evidence of  bacteremia or other source of infection.  She will complete a 5-day course of doxycycline.  The hematuria has improved.  She will have follow-up with neurology clinic in 2 weeks.      Discharge Follow Up Recommendations for outpatient labs/diagnostics:       Day of Discharge     HPI:   Patient is doing well this morning.  She has no complaints.  Overnight there was concern due to a low pulse ox while sleeping and was placed on 2 L of oxygen.  Patient denies any chest pain, shortness of breath or cough.  Would like to wean her oxygen down this morning to ensure that she does okay.  She may need some oxygen at night during sleep.    Review of Systems  Gen- No fevers, chills  CV- No chest pain, palpitations  Resp- No cough, dyspnea  GI- No N/V/D, abd pain        Vital Signs:   Temp:  [97.7 °F (36.5 °C)-98.2 °F (36.8 °C)] 98 °F (36.7 °C)  Heart Rate:  [57-68] 57  Resp:  [16] 16  BP: ()/(59-94) 112/70  Flow (L/min):  [2] 2      Physical Exam:  Constitutional: No acute distress.awake, interactive.  HENT: NCAT, mucous membranes moist  Respiratory: Clear to auscultation bilaterally, respiratory effort normal on rooma ir   Cardiovascular: RRR, no murmurs, rubs, or gallops  Gastrointestinal: Positive bowel sounds, soft, nontender, nondistended  Musculoskeletal: No bilateral ankle edema.    Psychiatric: Appropriate affect, cooperative  Neurologic: Oriented x 3, moves all extremities spontaneously without focal deficits, mild speech dysarthria    Pertinent  and/or Most Recent Results     LAB RESULTS:      Lab 09/15/22  0848 09/14/22  0509 09/14/22  0033 09/13/22  2143 09/13/22  0444 09/12/22  0756 09/11/22  1349 09/11/22  1344   WBC 9.03 10.58  --   --  12.15* 7.94 8.33  --    HEMOGLOBIN 12.0 12.4  --   --  13.8 13.0 13.2  --    HEMOGLOBIN, POC  --   --   --   --   --   --   --  14.3   HEMATOCRIT 37.0 38.9  --   --  42.5 39.5 40.4  --    HEMATOCRIT POC  --   --   --   --   --   --   --  42   PLATELETS 211 239  --   --   300 265 282  --    NEUTROS ABS  --  8.43*  --   --  10.28*  --  6.46  --    IMMATURE GRANS (ABS)  --  0.03  --   --  0.04  --  0.02  --    LYMPHS ABS  --  0.99  --   --  0.89  --  1.20  --    MONOS ABS  --  0.78  --   --  0.70  --  0.44  --    EOS ABS  --  0.30  --   --  0.19  --  0.15  --    MCV 89.6 89.2  --   --  88.4 86.4 87.4  --    LACTATE  --   --  1.7 2.1*  --   --   --   --    PROTIME  --   --   --   --   --   --   --  15.0   APTT  --   --   --   --   --   --  31.4  --          Lab 09/14/22  0509 09/13/22  0444 09/12/22  0756 09/11/22  1344   SODIUM 136 135* 136  --    POTASSIUM 4.4 4.1 4.1  --    CHLORIDE 103 101 99  --    CO2 23.0 20.0* 25.0  --    ANION GAP 10.0 14.0 12.0  --    BUN 15 12 12  --    CREATININE 0.63 0.57 0.56* 0.60   EGFR 81.3 83.3 83.6 82.3   GLUCOSE 99 124* 95  --    CALCIUM 9.4 9.9* 10.0*  --    HEMOGLOBIN A1C  --   --  5.90*  --    TSH  --   --  1.570  --          Lab 09/12/22  0756 09/11/22  1349   TOTAL PROTEIN 7.0  --    ALBUMIN 4.20  --    GLOBULIN 2.8  --    ALT (SGPT) 10 11   AST (SGOT) 28 23   BILIRUBIN 0.5  --    ALK PHOS 97  --          Lab 09/11/22  1349 09/11/22  1344   TROPONIN T <0.010  --    PROTIME  --  15.0   INR  --  1.3*         Lab 09/12/22  0756   CHOLESTEROL 205*   LDL CHOL 127*   HDL CHOL 60   TRIGLYCERIDES 103             Brief Urine Lab Results  (Last result in the past 365 days)      Color   Clarity   Blood   Leuk Est   Nitrite   Protein   CREAT   Urine HCG        09/13/22 1424 Red   Cloudy   Large (3+)   Large (3+)   Negative   100 mg/dL (2+)               Microbiology Results (last 10 days)     Procedure Component Value - Date/Time    Blood Culture - Blood, Hand, Right [[id number]]  (Normal) Collected: 09/13/22 2150    Lab Status: Preliminary result Specimen: Blood from Hand, Right Updated: 09/15/22 2215     Blood Culture No growth at 2 days    Blood Culture - Blood, Arm, Right [[id number]]  (Normal) Collected: 09/13/22 2145    Lab Status: Preliminary result  Specimen: Blood from Arm, Right Updated: 09/15/22 2215     Blood Culture No growth at 2 days    Urine Culture - Urine, Urine, Clean Catch [[id number]]  (Abnormal)  (Susceptibility) Collected: 09/13/22 1424    Lab Status: Final result Specimen: Urine, Clean Catch Updated: 09/15/22 0944     Urine Culture >100,000 CFU/mL Staphylococcus aureus, MRSA     Comment: Staphylococcus aureus is not typically regarded as a uropathogen, except in cases with genitourinary instrumentation present.  It's presence suggests an alternate source (e.g. bloodstream, abscess, SSTI, etc.).  Please evaluate accordingly.  Methicillin resistant Staph aureus, patient may be an isolation risk.  Corrected result. Previously Reported Organism Changed. Previous result was Gram Positive Cocci on 9/15/2022 at 0558 EDT.       Narrative:      Colonization of the urinary tract without infection is common. Treatment is discouraged unless the patient is symptomatic, pregnant, or undergoing an invasive urologic procedure.    Susceptibility      Staphylococcus aureus, MRSA      VIRGEN      Nitrofurantoin Susceptible      Oxacillin Resistant     Tetracycline Susceptible      Trimethoprim + Sulfamethoxazole Susceptible      Vancomycin Susceptible                       Susceptibility Comments     Staphylococcus aureus, MRSA    This isolate does not demonstrate inducible clindamycin resistance in vitro.                     Adult Transthoracic Echo Complete W/ Cont if Necessary Per Protocol (With Agitated Saline)    Result Date: 9/12/2022  · Left ventricular wall thickness is consistent with mild concentric hypertrophy. · Left ventricular ejection fraction appears to be 56 - 60%. · Left atrial volume is mildly increased. · There is calcification of the aortic valve. · Mild dilation of the aortic root is present. Mild dilation of the sinuses of Valsalva is present. Mild dilation of the ascending aorta is present. · MAC and calcification of subvalvular structures but  no significant MS · Mild MR      EEG    Result Date: 2022  Reason for referral: 89 y.o.female with altered mental status, decreased level of consciousness Technical Summary:  A 19 channel digital EEG was performed using the international 10-20 placement system, including eye leads and EKG leads. Duration: 20 minutes Findings: When the patient is awake, medium amplitude theta is seen over the left hemisphere, rest by contrast slower medium amplitude 2 to 4 Hz delta is seen with underlying theta present.  As a study proceeds, intermittent rhythmic delta activity is seen over the bilateral frontal regions, with greater prominence on the right.  Sleep is seen with slowing of the background; independent right hemispheric slowing persists.  No epileptiform activity is seen.  Photic stimulation does not change the background.  Hyperventilation is not performed. Video: On Technical quality: Superior EK to 70 bpm SUMMARY: Mild generalized slow with independent rhythmic frontal dominant delta present Independent right hemispheric slow, moderate No epileptiform activity seen     Focal cerebral dysfunction primarily impacting the right hemisphere Underlying mild diffuse cerebral dysfunction No evidence for epilepsy is seen This report is transcribed using the Dragon dictation system.      CT Head Without Contrast    Result Date: 2022  CT HEAD WO CONTRAST-  Date of Exam: 2022 12:51 PM  Indication: Stroke, follow up; I63.511-Cerebral infarction due to unspecified occlusion or stenosis of right middle cerebral artery; I10-Essential (primary) hypertension; Z66-Do not resuscitate.  Comparison: MRI brain without and with contrast 2022.  Technique:  Without contrast, contiguous axial CT images of the head were obtained from skull base to vertex.  Coronal and sagittal reconstructions were performed.  Automated exposure control and iterative reconstruction methods were used.  FINDINGS Hypodensity is seen within  the cortical-subcortical right frontal lobe and anterior right temporal lobe consistent with the recent infarct documented on yesterday's MRI brain. There is no indication of hemorrhagic transformation. No additional infarct is seen.  There is mild generalized atrophy with compensatory prominence of the ventricles and extra-axial spaces. Mild scattered deep white matter hypodensities are favored to reflect changes of chronic microvascular disease. There is a chronic lacunar infarct within the right basal ganglia.  No acute calvarial abnormality. Minor ethmoid sinus mucosal thickening. Mastoid air cells are clear. Mild intracranial carotid artery calcifications.       1. Right frontal/anterior temporal lobe cortical/subcortical acute/subacute infarct, corresponding to yesterday's MRI brain. The distribution of infarct is not thought to be significantly changed, and there is no indication of hemorrhagic transformation. 2. Atrophy. 3. Mild scattered chronic microvascular disease.  This report was finalized on 9/13/2022 1:06 PM by Debbie Otto MD.      CT Angiogram Neck    Result Date: 9/11/2022  Examination: CT ANGIOGRAM NECK-, CT ANGIOGRAM HEAD W AI ANALYSIS OF LVO-  Date of Exam: 9/11/2022 1:29 PM  Indication: Stroke, follow up.  Comparison: None available.  Technique: Axial volumetric contrast-enhanced CT angiographic images of the head and neck were acquired utilizing 100 mL [id number]-370  IV contrast. 3-D reconstructions were created for interpretation. Automated exposure control and iterative reconstruction methods were used. AI analysis of LVO was utilized for the CTA Head imaging portion of the study.   Findings: Aortic arch: There is aneurysmal dilatation of the proximal descending thoracic aorta up to 33 mm. There is 3 vessel arch anatomy. The right brachiocephalic and bilateral subclavian arteries appear widely patent with minimal nonstenosing disease.  Right carotid: The CCA follows a normal course and  appears normal caliber. There is minimal nonstenosing calcific plaque at the carotid bifurcation extending into the proximal ICA. ECA and distal branches appear patent. Cervical ICA is widely patent. There is mild nonstenosing plaque in the intracranial ICA segments. There is a large patent posterior communicating artery. The origin of the ophthalmic artery is normal. There is no clear definition of an anterior communicating artery. The M1 and A1 segments appear normal. Distal VAZQUEZ branches are patent. There is high-grade narrowing and occlusion of the distal right M2 anterior division. This occurs at the anterior superior aspect of the insula. The right MCA inferior and posterior divisions distal branches appear normal.  Left carotid: The left CCA follows a normal course and appears normal caliber. The left carotid bifurcation contains mild nonstenosing plaque. The ECA and distal branches are patent. Cervical ICA and intracranial ICA segments appear widely patent. There is a patent ophthalmic artery. No definite left P-comm. The left M1 and A1 segments and distal VAZQUEZ and MCA branches appear patent.  Posterior circulation: Vertebral arteries are codominant. The left vertebral artery appears tortuous. The vertebral arteries appear patent throughout the neck and into the head. The V4 segments, basilar artery, superior cerebellar arteries and P1 segments appear patent. There is focal high-grade stenosis in the right P2 segment with similar finding at the right. 3/T4 junction. There is adequate distal reconstitution. There is moderate focal stenosis in the left P2 segment and a high-grade stenosis or occlusion in the left P3 segment with weak segmental distal reconstitution.. There is no evidence of acute intracranial hemorrhage. There is thinning of the orbital lenses bilaterally. There is no evidence of a neck mass or adenopathy. There is mild scarring in the lung apices. There are moderate cervical degenerative  changes.       1. No evidence of carotid artery or vertebral artery stenosis. 2. There are multiple areas of high-grade narrowing or occlusion within the distal M2 branches of the right MCA anterior division at the upper margin of the insula. 3. Multiple focal high-grade stenoses in both posterior cerebral arteries. There is weak segmental reconstitution of the distal left PCA branches.  This report was finalized on 9/11/2022 2:34 PM by Rom Javed MD.      MRI Brain Without Contrast    Result Date: 9/12/2022  DATE OF EXAM: 9/12/2022 1:00 PM  PROCEDURE: MRI BRAIN WO CONTRAST-  INDICATIONS: Stroke, follow up; I63.511-Cerebral infarction due to unspecified occlusion or stenosis of right middle cerebral artery; I10-Essential (primary) hypertension; Z66-Do not resuscitate  COMPARISON: Same-day CT cerebral perfusion, CTA head and neck, CT head  TECHNIQUE: Multiplanar multisequence images of the brain were performed without contrast according to routine brain MRI protocol.  FINDINGS: There is an acute infarct in the right frontotemporal lobe with involvement of the insular cortex (series 3 image [id number]). There is corresponding T2/FLAIR hyperintense signal change within this region, with mild cerebral swelling. No acute intracranial hemorrhage/hemorrhagic transformation. There are a few punctate foci of GRE susceptibility within the region of infarct likely reflecting microhemorrhage/early cortical laminar necrosis. There is no midline shift or herniation. Elsewhere there are scattered subcortical and periventricular white matter FLAIR/T2 hyperintensities which are nonspecific and can be seen in the setting of chronic small vessel ischemic change. There is mild global cerebral volume loss. No extra-axial collections. Normal size and configuration of the ventricles. The cerebellopontine angles and midline structures are normal. The major intracranial vascular flow voids are preserved and appear grossly unremarkable. The  paranasal sinuses and mastoid air cells are clear. No acute or suspicious bony findings. Normal appearance of the orbits.      Acute infarct in the right frontotemporal region as above.  Findings discussed with stroke navigator by Dr. Cedrick Haq via telephone at 3:10 PM 9/12/2022.  This report was finalized on 9/12/2022 3:11 PM by Cedrick Haq MD.      XR Chest 1 View    Result Date: 9/11/2022  Examination: XR CHEST 1 VW-  Date of Exam: 9/11/2022 3:28 PM  Indication: Acute Stroke Protocol (onset < 12 hrs); I63.511-Cerebral infarction due to unspecified occlusion or stenosis of right middle cerebral artery; I10-Essential (primary) hypertension; Z66-Do not resuscitate.  Comparison: Correlation with CTA neck performed earlier today.  Technique: Single radiographic view of the chest was obtained.  Findings: There is no pneumothorax, pleural effusion or focal airspace consolidation.. There is left basilar opacity, likely atelectasis. There is diffuse interstitial prominence in the lungs. Cardiac silhouette appears enlarged. Pulmonary vasculature appears within normal limits. There is an old healed proximal left humerus fracture. There is DJD of both shoulders. There are moderate spinal degenerative changes.      Probable cardiomegaly with chronic changes in the lungs. No definite acute disease.  This report was finalized on 9/11/2022 3:59 PM by Rom Javed MD.      CT Abdomen Pelvis Stone Protocol    Result Date: 9/14/2022  DATE OF EXAM: 9/13/2022 9:22 PM  PROCEDURE: CT ABDOMEN PELVIS STONE PROTOCOL-  INDICATIONS: hematuria, UTI. stone suspected; I63.511-Cerebral infarction due to unspecified occlusion or stenosis of right middle cerebral artery; I10-Essential (primary) hypertension; Z66-Do not resuscitate  COMPARISON: No comparisons available.  TECHNIQUE: Routine transaxial slices were obtained through the abdomen and pelvis without the administration of intravenous contrast. Reconstructed coronal and sagittal  images were also obtained. Automated exposure control and iterative reconstruction methods were used.  The radiation dose reduction device was turned on for each scan per the ALARA (As Low as Reasonably Achievable) protocol.  FINDINGS: The exam is somewhat limited owing to respiratory motion artifact. Evaluation of the lower pelvis is somewhat limited owing to extensive streak artifact and beam hardening owing to bilateral hip hardware.  There are chronic interstitial changes in the partially imaged lung bases without focal consolidation. Mitral annular calcifications and ectatic ascending thoracic aorta are noted. A few circumscribed small hepatic hypodensities are noted, likely hepatic cyst. Otherwise unremarkable appearance of the liver. Unremarkable appearance of the gallbladder and bile ducts. Normal appearance of the spleen. There is fatty infiltration of the pancreas without pancreatic ductal dilatation. Normal appearance of the adrenal glands. Unremarkable noncontrast appearance of the kidneys. No evidence of hydronephrosis. No definite nephrolithiasis. No perinephric fat stranding or fluid. Normal appearance of the ureters without evidence of ureteral stone, noting that the distal-most ureters at the ureterovesical junctions are not well imaged owing to extensive streak artifact. Partially imaged portions of the urinary bladder appear grossly unremarkable. The uterus surgically absent. There is a 3.1 cm thin-walled cystic structure in the left pelvis adjacent to the left iliac vessels, possibly ovarian cyst. There is inspissated stool in the rectum. There is sigmoid colonic diverticulosis without evidence of diverticulitis. There is a large hiatal hernia, incompletely visualized. No evidence of bowel obstruction or definite inflammatory change of the GI tract. Moderate colonic stool burden. No abdominopelvic free fluid or conspicuous fat stranding. No pneumoperitoneum. There is atherosclerosis and mild  ectasia of the abdominal aorta without aneurysm. No lymphadenopathy. The body wall soft tissues appear unremarkable. The bones are severely demineralized. No definite acute osseous findings. There are degenerative changes throughout the imaged spine. There is partial visualization of bilateral total hip arthroplasties.      No findings to explain hematuria. Specifically, no evidence of nephrolithiasis.  Large hiatal hernia.  Moderate colonic stool burden with inspissated stool in the rectum compatible constipation.  Colonic diverticulosis without diverticulitis.  This report was finalized on 9/14/2022 12:05 AM by Cedrick Haq MD.      CT Head Without Contrast Stroke Protocol    Result Date: 9/11/2022  Examination: CT HEAD WO CONTRAST STROKE PROTOCOL-  Date of Exam: 9/11/2022 1:22 PM  Indication: Neuro deficit, acute, stroke suspected.  Comparison: 01/02/2019.  Technique: Axial noncontrast CT imaging of the head was performed. Automated exposure control and iterative reconstruction methods were used.  Findings: Superficial soft tissues appear within normal limits. The calvarium is intact.  Paranasal sinuses and mastoid air cells appear well aerated. There is thinning of the orbital lenses bilaterally, unchanged and likely corresponding to cataract surgery. There is no acute intracranial hemorrhage.  No mass effect or midline shift.  No abnormal extra-axial collections.  Gray-white differentiation is within normal limits.  There is mild patchy periventricular white matter hypoattenuation. There is a new small triangular hypodensity in the right lentiform nucleus, which may represent a chronic lacunar infarct or prominent perivascular space. There is intracranial vascular calcification. Ventricular size and configuration is normal for age.        1. No acute intracranial abnormality. 2. Chronic small vessel ischemic change. 3. New small triangular hypodensity in the right lentiform nucleus, likely chronic lacunar  infarct versus prominent perivascular space.  This report was finalized on 9/11/2022 1:35 PM by Rom Javed MD.      CT Angiogram Head w AI Analysis of LVO    Result Date: 9/11/2022  Examination: CT ANGIOGRAM NECK-, CT ANGIOGRAM HEAD W AI ANALYSIS OF LVO-  Date of Exam: 9/11/2022 1:29 PM  Indication: Stroke, follow up.  Comparison: None available.  Technique: Axial volumetric contrast-enhanced CT angiographic images of the head and neck were acquired utilizing 100 mL [id number]-370  IV contrast. 3-D reconstructions were created for interpretation. Automated exposure control and iterative reconstruction methods were used. AI analysis of LVO was utilized for the CTA Head imaging portion of the study.   Findings: Aortic arch: There is aneurysmal dilatation of the proximal descending thoracic aorta up to 33 mm. There is 3 vessel arch anatomy. The right brachiocephalic and bilateral subclavian arteries appear widely patent with minimal nonstenosing disease.  Right carotid: The CCA follows a normal course and appears normal caliber. There is minimal nonstenosing calcific plaque at the carotid bifurcation extending into the proximal ICA. ECA and distal branches appear patent. Cervical ICA is widely patent. There is mild nonstenosing plaque in the intracranial ICA segments. There is a large patent posterior communicating artery. The origin of the ophthalmic artery is normal. There is no clear definition of an anterior communicating artery. The M1 and A1 segments appear normal. Distal VAZQUEZ branches are patent. There is high-grade narrowing and occlusion of the distal right M2 anterior division. This occurs at the anterior superior aspect of the insula. The right MCA inferior and posterior divisions distal branches appear normal.  Left carotid: The left CCA follows a normal course and appears normal caliber. The left carotid bifurcation contains mild nonstenosing plaque. The ECA and distal branches are patent. Cervical ICA and  intracranial ICA segments appear widely patent. There is a patent ophthalmic artery. No definite left P-comm. The left M1 and A1 segments and distal VAZQUEZ and MCA branches appear patent.  Posterior circulation: Vertebral arteries are codominant. The left vertebral artery appears tortuous. The vertebral arteries appear patent throughout the neck and into the head. The V4 segments, basilar artery, superior cerebellar arteries and P1 segments appear patent. There is focal high-grade stenosis in the right P2 segment with similar finding at the right. 3/T4 junction. There is adequate distal reconstitution. There is moderate focal stenosis in the left P2 segment and a high-grade stenosis or occlusion in the left P3 segment with weak segmental distal reconstitution.. There is no evidence of acute intracranial hemorrhage. There is thinning of the orbital lenses bilaterally. There is no evidence of a neck mass or adenopathy. There is mild scarring in the lung apices. There are moderate cervical degenerative changes.       1. No evidence of carotid artery or vertebral artery stenosis. 2. There are multiple areas of high-grade narrowing or occlusion within the distal M2 branches of the right MCA anterior division at the upper margin of the insula. 3. Multiple focal high-grade stenoses in both posterior cerebral arteries. There is weak segmental reconstitution of the distal left PCA branches.  This report was finalized on 9/11/2022 2:34 PM by Rom Javed MD.      CT CEREBRAL PERFUSION WITH & WITHOUT CONTRAST    Result Date: 9/11/2022  DATE OF EXAM: 9/11/2022 1:29 PM  PROCEDURE: CT CEREBRAL PERFUSION W WO CONTRAST-  INDICATIONS: Neuro deficit, acute, stroke suspected  COMPARISON: No comparisons available.  TECHNIQUE: Routine transaxial cuts were obtained through the head without administration of contrast. Routine transaxial cuts were then obtained through the head following the intravenous administration of 100 mL of Isovue  370. Core blood volume, core blood flow, mean transit time, and Tmax images were obtained utilizing the Rapid software protocol. A limited CT angiogram of the head was also performed to measure the blood vessel density.  The radiation dose reduction device was turned on for each scan per the ALARA (As Low as Reasonably Achievable) protocol.  FINDINGS: There is a small region of cerebral blood flow less than 30% in the right central MCA distribution with a volume of 12 mL. There is a larger region of Tmax greater than 6 seconds measuring 39 mm also in the right central MCA distribution. Mismatch volume is 27 mL with a mismatch ratio is 3.3. These areas demonstrate decreased blood volume, decreased blood flow, increased mean transit time and increased Tmax.      There is evidence for 27 mL brain risk in the right MCA distribution with 12 mL central core infarct.  This report was finalized on 9/11/2022 2:18 PM by Rom Javed MD.                Results for orders placed during the hospital encounter of 09/11/22    Adult Transthoracic Echo Complete W/ Cont if Necessary Per Protocol (With Agitated Saline)    Interpretation Summary  · Left ventricular wall thickness is consistent with mild concentric hypertrophy.  · Left ventricular ejection fraction appears to be 56 - 60%.  · Left atrial volume is mildly increased.  · There is calcification of the aortic valve.  · Mild dilation of the aortic root is present. Mild dilation of the sinuses of Valsalva is present. Mild dilation of the ascending aorta is present.  · MAC and calcification of subvalvular structures but no significant MS  · Mild MR      Plan for Follow-up of Pending Labs/Results: will follow up, so far NGTD  Pending Labs     Order Current Status    Blood Culture - Blood, Arm, Right Preliminary result    Blood Culture - Blood, Hand, Right Preliminary result        Discharge Details        Discharge Medications      New Medications      Instructions Start Date    aspirin 81 MG chewable tablet   81 mg, Oral, Daily      atorvastatin 40 MG tablet  Commonly known as: LIPITOR   40 mg, Oral, Nightly      doxycycline 100 MG capsule  Commonly known as: MONODOX   100 mg, Oral, Every 12 Hours Scheduled      losartan 50 MG tablet  Commonly known as: COZAAR   50 mg, Oral, Every 24 Hours Scheduled      terazosin 1 MG capsule  Commonly known as: HYTRIN   1 mg, Oral, Nightly         Continue These Medications      Instructions Start Date   LEVOTHYROXINE SODIUM PO   112 mcg, Oral      sertraline 25 MG tablet  Commonly known as: ZOLOFT   25 mg, Oral, Daily             Allergies   Allergen Reactions   • Levaquin [Levofloxacin] Other (See Comments)     Other     • Penicillins Other (See Comments)     Other     • Sulfa Antibiotics Other (See Comments)     Other           Discharge Disposition:  Rehab Facility or Unit (DC - External)    Diet:  Hospital:  Diet Order   Procedures   • Diet Regular; Thin; Cardiac       Activity:      Restrictions or Other Recommendations:  As tolerated       CODE STATUS:    Code Status and Medical Interventions:   Ordered at: 09/11/22 1514     Level Of Support Discussed With:    Patient     Code Status (Patient has no pulse and is not breathing):    No CPR (Do Not Attempt to Resuscitate)     Medical Interventions (Patient has pulse or is breathing):    Full Support       No future appointments.              Anahy Lu MD  09/16/22      Time Spent on Discharge:  I spent  31  minutes on this discharge activity which included: face-to-face encounter with the patient, reviewing the data in the system, coordination of the care with the nursing staff as well as consultants, documentation, and entering orders.

## 2022-09-16 NOTE — NURSING NOTE
Pt slept well last night. Room air during the night. Oxygen dropped to 79% around 0500 and came back to 91% within 1 minute without intervention. A few minutes later oxygen dropped to 66% and I woke the pt and she came up to 91% and placed her on 2L.

## 2022-09-16 NOTE — PLAN OF CARE
Goal Outcome Evaluation:  Plan of Care Reviewed With: patient        Progress: improving   SLP treatment completed. Will sign-off for speech at this time. Please see note for further details and recommendations.

## 2022-09-16 NOTE — THERAPY DISCHARGE NOTE
Acute Care - Speech Language Pathology Treatment Note/Discharge  [facility]     Patient Name: Nehal Parker  : 2/15/1926  MRN: [id number]  Today's Date: 2022               Admit Date: 2022     Visit Dx:    ICD-10-CM ICD-9-CM   1. Acute ischemic right MCA stroke (HCC)  I63.511 434.91   2. Elevated blood pressure reading with diagnosis of hypertension  I10 401.9   3. DNR (do not resuscitate)  Z66 V49.86     Patient Active Problem List   Diagnosis   • Acute left-sided weakness   • Acute CVA (cerebrovascular accident) (HCC)   • HLD (hyperlipidemia)   • Essential hypertension   • Hypothyroidism (acquired)   • Mild depression   • Cystitis     Past Medical History:   Diagnosis Date   • Depression    • Disease of thyroid gland    • Hypertension      History reviewed. No pertinent surgical history.    SLP Recommendation and Plan                                      Treatment Assessment (SLP): Pt functional for speech at this time. SLP will sign off. Please re-consult if status changes. (22)  Plan for Continued Treatment (SLP): treatment no longer indicated as all goals met (22)    Plan of Care Reviewed With: patient (22)  Progress: improving (22)    SLP EVALUATION (last 72 hours)     SLP SLC Evaluation     Row Name 22                   Communication Assessment/Intervention    Document Type therapy note (daily note)  -HG        Subjective Information no complaints  -HG        Patient Observations cooperative;alert;agree to therapy  -HG        Patient/Family/Caregiver Comments/Observations No family present  -HG        Patient Effort good  -HG        Symptoms Noted During/After Treatment none  -HG                  Pain Scale: Numbers Pre/Post-Treatment    Pretreatment Pain Rating 0/10 - no pain  -HG                  SLP Treatment Clinical Impressions    Treatment Assessment (SLP) Pt functional for speech at this time. SLP will sign off. Please re-consult  if status changes.  -        Plan for Continued Treatment (SLP) treatment no longer indicated as all goals met  -        Care Plan Review care plan/treatment goals reviewed  -              User Key  (r) = Recorded By, (t) = Taken By, (c) = Cosigned By    Initials Name Effective Dates     Rosemary Edmonds, MS CCC-SLP 06/16/21 -                    EDUCATION  The patient has been educated in the following areas:   Communication Impairment.           SLP GOALS     Row Name 09/16/22 0815 09/14/22 1340 09/13/22 1345       (LTG) Patient will demonstrate functional swallow for    Diet Texture (Demonstrate functional swallow) -- regular textures  - regular textures  -    Liquid viscosity (Demonstrate functional swallow) -- thin liquids  - thin liquids  -    [place]ettinger (Demonstrate functional swallow) -- independently (over 90% accuracy)  - independently (over 90% accuracy)  -    Time Frame (Demonstrate functional swallow) -- by discharge  - by discharge  -    Progress/Outcomes (Demonstrate functional swallow) -- goal met  - good progress toward goal  -       ([facility]) Patient will tolerate trials of    Consistencies Trialed (Tolerate trials) -- regular textures;thin liquids  - regular textures;thin liquids  -    Desired Outcome (Tolerate trials) -- without signs/symptoms of aspiration;without signs of distress  - without signs/symptoms of aspiration;without signs of distress  -    [place]ettinger (Tolerate trials) -- independently (over 90% accuracy)  - independently (over 90% accuracy)  -    Time Frame (Tolerate trials) -- by discharge  - by discharge  -    Progress/Outcomes (Tolerate trials) -- goal met  - good progress toward goal  -    Comment (Tolerate trials) -- No overt s/s w/ thin liquids/ regular solid trials. Pt reports no difficulty w/ lunch tray, majority of tray eaten prior to session.  - Pt w/ no overt clinical s/sx of aspiration with thin liquids. Pt declined  solid.  -KL       Articulation Goal 1 (SLP)    Improve Articulation Goal 1 (SLP) by over-articulating in connected speech;80%;with minimal cues (75-90%)  -HG by over-articulating in connected speech;80%;with minimal cues (75-90%)  -MH by over-articulating in connected speech;80%;with minimal cues (75-90%)  -KL    Time Frame (Articulation Goal 1, SLP) short term goal (STG)  -HG short term goal (STG)  -MH short term goal (STG)  -KL    Progress (Articulation Goal 1, SLP) 90%;independently (over 90% accuracy)  -HG 80%;with minimal cues (75-90%)  -MH --    Progress/Outcomes (Articulation Goal 1, SLP) goal met  -HG continuing progress toward goal  -MH goal ongoing  -KL    Comment (Articulation Goal 1, SLP) Pt reports that her speech is at baseline. Even at conversation level, pt is functional.  -HG Pt able to overarticulate initally during connected speech, min cues required for pt to cont throughout conversation  -MH --       Additional Goal 1 (SLP)    Additional Goal 1, SLP LTG: Pt will improve communication in order to fully participate in care while in hospital setting w/ 100% acc and no cues  -HG LTG: Pt will improve communication in order to fully participate in care while in hospital setting w/ 100% acc and no cues  -MH LTG: Pt will improve communication in order to fully participate in care while in hospital setting w/ 100% acc and no cues  -KL    Time Frame (Additional Goal 1, SLP) by discharge  -HG by discharge  -MH by discharge  -KL    Progress/Outcomes (Additional Goal 1, SLP) goal met  -HG continuing progress toward goal  -MH --          User Key  (r) = Recorded By, (t) = Taken By, (c) = Cosigned By    Initials Name Provider Type    Rosemary Smith, MS CCC-SLP Speech and Language Pathologist    Delia Ramirez MS CCC-SLP Speech and Language Pathologist    Denise Shannon T, MS, CFY-SLP Speech and Language Pathologist                    Time Calculation:    Time Calculation- SLP     Row Name 09/16/22  0830             Time Calculation- SLP    SLP Start Time 0815  -HG      SLP Received On 09/16/22  -HG              Untimed Charges    72637-[id number] Treatment/ST Modification Prosth Aug Alter  15  -HG              Total Minutes    Untimed Charges Total Minutes 15  -HG       Total Minutes 15  -HG            User Key  (r) = Recorded By, (t) = Taken By, (c) = Cosigned By    Initials Name Provider Type     Rosemary Edmonds MS CCC-SLP Speech and Language Pathologist                Therapy Charges for Today     Code Description Service Date Service Provider Modifiers Qty    [id number]  ST TREATMENT SPEECH 1 9/16/2022 Rosemary Edmonds MS CCC-SLP GN 1          Patient was not wearing a face mask and did not exhibit coughing during this therapy encounter.  Procedure performed was not aerosolizing, involved close contact (within 6 feet for at least 15 minutes or longer), and did not involve contact with infectious secretions or specimens.  Therapist used appropriate personal protective equipment including standard procedure mask.  Appropriate PPE was worn during the entire therapy session.  Hand hygiene was completed before and after therapy session.                 Rosemary Edmonds MS CCC-SLP  9/16/2022

## 2022-09-18 LAB
BACTERIA SPEC AEROBE CULT: NORMAL
BACTERIA SPEC AEROBE CULT: NORMAL

## 2022-09-21 LAB
QT INTERVAL: 466 MS
QTC INTERVAL: 476 MS

## 2023-03-23 ENCOUNTER — APPOINTMENT (OUTPATIENT)
Dept: GENERAL RADIOLOGY | Facility: HOSPITAL | Age: 88
End: 2023-03-23
Payer: MEDICARE

## 2023-03-23 ENCOUNTER — APPOINTMENT (OUTPATIENT)
Dept: CT IMAGING | Facility: HOSPITAL | Age: 88
End: 2023-03-23
Payer: MEDICARE

## 2023-03-23 ENCOUNTER — HOSPITAL ENCOUNTER (EMERGENCY)
Facility: HOSPITAL | Age: 88
Discharge: HOME | End: 2023-03-23
Attending: EMERGENCY MEDICINE | Admitting: EMERGENCY MEDICINE
Payer: MEDICARE

## 2023-03-23 VITALS
DIASTOLIC BLOOD PRESSURE: 83 MMHG | WEIGHT: 122 LBS | OXYGEN SATURATION: 99 % | HEART RATE: 65 BPM | BODY MASS INDEX: 21.62 KG/M2 | SYSTOLIC BLOOD PRESSURE: 177 MMHG | RESPIRATION RATE: 16 BRPM | HEIGHT: 63 IN | TEMPERATURE: 97.9 F

## 2023-03-23 DIAGNOSIS — S00.01XA ABRASION OF SCALP, INITIAL ENCOUNTER: ICD-10-CM

## 2023-03-23 DIAGNOSIS — S00.31XA ABRASION OF NOSE, INITIAL ENCOUNTER: ICD-10-CM

## 2023-03-23 DIAGNOSIS — S09.90XA CLOSED HEAD INJURY, INITIAL ENCOUNTER: Primary | ICD-10-CM

## 2023-03-23 DIAGNOSIS — S00.03XA HEMATOMA OF SCALP, INITIAL ENCOUNTER: ICD-10-CM

## 2023-03-23 PROCEDURE — 73502 X-RAY EXAM HIP UNI 2-3 VIEWS: CPT

## 2023-03-23 PROCEDURE — 73030 X-RAY EXAM OF SHOULDER: CPT

## 2023-03-23 PROCEDURE — 70450 CT HEAD/BRAIN W/O DYE: CPT

## 2023-03-23 PROCEDURE — 70486 CT MAXILLOFACIAL W/O DYE: CPT

## 2023-03-23 PROCEDURE — 99284 EMERGENCY DEPT VISIT MOD MDM: CPT

## 2023-03-23 RX ORDER — BACITRACIN, NEOMYCIN, POLYMYXIN B 400; 3.5; 5 [USP'U]/G; MG/G; [USP'U]/G
1 OINTMENT TOPICAL ONCE
Status: COMPLETED | OUTPATIENT
Start: 2023-03-23 | End: 2023-03-23

## 2023-03-23 RX ADMIN — BACITRACIN, NEOMYCIN, POLYMYXIN B 1 APPLICATION: 400; 3.5; 5 OINTMENT TOPICAL at 16:17

## 2023-03-23 NOTE — DISCHARGE INSTRUCTIONS
Ice and Tylenol as we discussed.    Return to the emergency department immediately for new or change concerns.    Keep abrasions clean and dry.  Use bacitracin as needed.    Follow-up with your PCP for recheck in the next week.    Please review the medications you are supposed to be taking according to prior physician recommendations. I have not changed your home medications during this visit. If your discharge instructions indicate that I have changed your home medications, this is not the case, and you should disregard. If you have any questions about the medication you should be taking at home, please call your physician.
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
## 2024-04-15 ENCOUNTER — APPOINTMENT (OUTPATIENT)
Dept: GENERAL RADIOLOGY | Facility: HOSPITAL | Age: 89
DRG: 690 | End: 2024-04-15
Payer: MEDICARE

## 2024-04-15 ENCOUNTER — HOSPITAL ENCOUNTER (INPATIENT)
Facility: HOSPITAL | Age: 89
LOS: 3 days | Discharge: NURSING, REHAB OR LONG-TERM CARE | DRG: 690 | End: 2024-04-18
Attending: EMERGENCY MEDICINE | Admitting: INTERNAL MEDICINE
Payer: MEDICARE

## 2024-04-15 ENCOUNTER — APPOINTMENT (OUTPATIENT)
Dept: CT IMAGING | Facility: HOSPITAL | Age: 89
DRG: 690 | End: 2024-04-15
Payer: MEDICARE

## 2024-04-15 DIAGNOSIS — R79.89 ELEVATED LFTS: ICD-10-CM

## 2024-04-15 DIAGNOSIS — E86.0 ACUTE DEHYDRATION: ICD-10-CM

## 2024-04-15 DIAGNOSIS — R13.10 DYSPHAGIA, UNSPECIFIED TYPE: ICD-10-CM

## 2024-04-15 DIAGNOSIS — K57.92 ACUTE DIVERTICULITIS: Primary | ICD-10-CM

## 2024-04-15 DIAGNOSIS — R74.8 ELEVATED LIPASE: ICD-10-CM

## 2024-04-15 PROBLEM — Z86.73 HISTORY OF CVA (CEREBROVASCULAR ACCIDENT): Status: ACTIVE | Noted: 2024-04-15

## 2024-04-15 LAB
ALBUMIN SERPL-MCNC: 3.3 G/DL (ref 3.5–5.2)
ALBUMIN/GLOB SERPL: 1 G/DL
ALP SERPL-CCNC: 393 U/L (ref 39–117)
ALT SERPL W P-5'-P-CCNC: 102 U/L (ref 1–33)
ANION GAP SERPL CALCULATED.3IONS-SCNC: 8 MMOL/L (ref 5–15)
AST SERPL-CCNC: 113 U/L (ref 1–32)
B PARAPERT DNA SPEC QL NAA+PROBE: NOT DETECTED
B PERT DNA SPEC QL NAA+PROBE: NOT DETECTED
BACTERIA UR QL AUTO: ABNORMAL /HPF
BASOPHILS # BLD AUTO: 0.04 10*3/MM3 (ref 0–0.2)
BASOPHILS NFR BLD AUTO: 0.3 % (ref 0–1.5)
BILIRUB SERPL-MCNC: 0.4 MG/DL (ref 0–1.2)
BILIRUB UR QL STRIP: ABNORMAL
BUN SERPL-MCNC: 27 MG/DL (ref 8–23)
BUN/CREAT SERPL: 49.1 (ref 7–25)
C PNEUM DNA NPH QL NAA+NON-PROBE: NOT DETECTED
CALCIUM SPEC-SCNC: 10 MG/DL (ref 8.2–9.6)
CHLORIDE SERPL-SCNC: 105 MMOL/L (ref 98–107)
CLARITY UR: ABNORMAL
CO2 SERPL-SCNC: 27 MMOL/L (ref 22–29)
COLOR UR: ABNORMAL
CREAT SERPL-MCNC: 0.55 MG/DL (ref 0.57–1)
D-LACTATE SERPL-SCNC: 1.4 MMOL/L (ref 0.5–2)
D-LACTATE SERPL-SCNC: 2.2 MMOL/L (ref 0.5–2)
DEPRECATED RDW RBC AUTO: 47.5 FL (ref 37–54)
EGFRCR SERPLBLD CKD-EPI 2021: 83 ML/MIN/1.73
EOSINOPHIL # BLD AUTO: 0.58 10*3/MM3 (ref 0–0.4)
EOSINOPHIL NFR BLD AUTO: 5 % (ref 0.3–6.2)
ERYTHROCYTE [DISTWIDTH] IN BLOOD BY AUTOMATED COUNT: 14.5 % (ref 12.3–15.4)
FLUAV SUBTYP SPEC NAA+PROBE: NOT DETECTED
FLUBV RNA ISLT QL NAA+PROBE: NOT DETECTED
GLOBULIN UR ELPH-MCNC: 3.4 GM/DL
GLUCOSE SERPL-MCNC: 163 MG/DL (ref 65–99)
GLUCOSE UR STRIP-MCNC: NEGATIVE MG/DL
HADV DNA SPEC NAA+PROBE: NOT DETECTED
HCOV 229E RNA SPEC QL NAA+PROBE: NOT DETECTED
HCOV HKU1 RNA SPEC QL NAA+PROBE: NOT DETECTED
HCOV NL63 RNA SPEC QL NAA+PROBE: NOT DETECTED
HCOV OC43 RNA SPEC QL NAA+PROBE: NOT DETECTED
HCT VFR BLD AUTO: 38.2 % (ref 34–46.6)
HGB BLD-MCNC: 12.2 G/DL (ref 12–15.9)
HGB UR QL STRIP.AUTO: NEGATIVE
HMPV RNA NPH QL NAA+NON-PROBE: NOT DETECTED
HPIV1 RNA ISLT QL NAA+PROBE: NOT DETECTED
HPIV2 RNA SPEC QL NAA+PROBE: NOT DETECTED
HPIV3 RNA NPH QL NAA+PROBE: NOT DETECTED
HPIV4 P GENE NPH QL NAA+PROBE: NOT DETECTED
HYALINE CASTS UR QL AUTO: ABNORMAL /LPF
IMM GRANULOCYTES # BLD AUTO: 0.06 10*3/MM3 (ref 0–0.05)
IMM GRANULOCYTES NFR BLD AUTO: 0.5 % (ref 0–0.5)
KETONES UR QL STRIP: ABNORMAL
LEUKOCYTE ESTERASE UR QL STRIP.AUTO: ABNORMAL
LIPASE SERPL-CCNC: 119 U/L (ref 13–60)
LYMPHOCYTES # BLD AUTO: 0.62 10*3/MM3 (ref 0.7–3.1)
LYMPHOCYTES NFR BLD AUTO: 5.3 % (ref 19.6–45.3)
M PNEUMO IGG SER IA-ACNC: NOT DETECTED
MAGNESIUM SERPL-MCNC: 2 MG/DL (ref 1.7–2.3)
MCH RBC QN AUTO: 28.6 PG (ref 26.6–33)
MCHC RBC AUTO-ENTMCNC: 31.9 G/DL (ref 31.5–35.7)
MCV RBC AUTO: 89.7 FL (ref 79–97)
MONOCYTES # BLD AUTO: 0.57 10*3/MM3 (ref 0.1–0.9)
MONOCYTES NFR BLD AUTO: 4.9 % (ref 5–12)
NEUTROPHILS NFR BLD AUTO: 84 % (ref 42.7–76)
NEUTROPHILS NFR BLD AUTO: 9.84 10*3/MM3 (ref 1.7–7)
NITRITE UR QL STRIP: NEGATIVE
NRBC BLD AUTO-RTO: 0 /100 WBC (ref 0–0.2)
PH UR STRIP.AUTO: 6 [PH] (ref 5–8)
PLATELET # BLD AUTO: 142 10*3/MM3 (ref 140–450)
PMV BLD AUTO: 10.5 FL (ref 6–12)
POTASSIUM SERPL-SCNC: 4.1 MMOL/L (ref 3.5–5.2)
PROT SERPL-MCNC: 6.7 G/DL (ref 6–8.5)
PROT UR QL STRIP: ABNORMAL
QT INTERVAL: 392 MS
QTC INTERVAL: 466 MS
RBC # BLD AUTO: 4.26 10*6/MM3 (ref 3.77–5.28)
RBC # UR STRIP: ABNORMAL /HPF
REF LAB TEST METHOD: ABNORMAL
RHINOVIRUS RNA SPEC NAA+PROBE: NOT DETECTED
RSV RNA NPH QL NAA+NON-PROBE: NOT DETECTED
SARS-COV-2 RNA NPH QL NAA+NON-PROBE: NOT DETECTED
SODIUM SERPL-SCNC: 140 MMOL/L (ref 136–145)
SP GR UR STRIP: 1.02 (ref 1–1.03)
SQUAMOUS #/AREA URNS HPF: ABNORMAL /HPF
TRANS CELLS #/AREA URNS HPF: ABNORMAL /HPF
TROPONIN T SERPL HS-MCNC: 13 NG/L
UROBILINOGEN UR QL STRIP: ABNORMAL
WBC # UR STRIP: ABNORMAL /HPF
WBC NRBC COR # BLD AUTO: 11.71 10*3/MM3 (ref 3.4–10.8)

## 2024-04-15 PROCEDURE — 25810000003 SODIUM CHLORIDE 0.9 % SOLUTION: Performed by: INTERNAL MEDICINE

## 2024-04-15 PROCEDURE — 25010000002 AZTREONAM PER 500 MG

## 2024-04-15 PROCEDURE — 25010000002 HEPARIN (PORCINE) PER 1000 UNITS: Performed by: INTERNAL MEDICINE

## 2024-04-15 PROCEDURE — 25810000003 SODIUM CHLORIDE 0.9 % SOLUTION: Performed by: EMERGENCY MEDICINE

## 2024-04-15 PROCEDURE — 87086 URINE CULTURE/COLONY COUNT: CPT | Performed by: EMERGENCY MEDICINE

## 2024-04-15 PROCEDURE — 71045 X-RAY EXAM CHEST 1 VIEW: CPT

## 2024-04-15 PROCEDURE — 36415 COLL VENOUS BLD VENIPUNCTURE: CPT

## 2024-04-15 PROCEDURE — 93005 ELECTROCARDIOGRAM TRACING: CPT | Performed by: EMERGENCY MEDICINE

## 2024-04-15 PROCEDURE — 99285 EMERGENCY DEPT VISIT HI MDM: CPT

## 2024-04-15 PROCEDURE — 0202U NFCT DS 22 TRGT SARS-COV-2: CPT | Performed by: EMERGENCY MEDICINE

## 2024-04-15 PROCEDURE — 99222 1ST HOSP IP/OBS MODERATE 55: CPT | Performed by: INTERNAL MEDICINE

## 2024-04-15 PROCEDURE — 83605 ASSAY OF LACTIC ACID: CPT | Performed by: EMERGENCY MEDICINE

## 2024-04-15 PROCEDURE — 87040 BLOOD CULTURE FOR BACTERIA: CPT | Performed by: EMERGENCY MEDICINE

## 2024-04-15 PROCEDURE — 81001 URINALYSIS AUTO W/SCOPE: CPT | Performed by: EMERGENCY MEDICINE

## 2024-04-15 PROCEDURE — P9612 CATHETERIZE FOR URINE SPEC: HCPCS

## 2024-04-15 PROCEDURE — 83735 ASSAY OF MAGNESIUM: CPT | Performed by: EMERGENCY MEDICINE

## 2024-04-15 PROCEDURE — 80053 COMPREHEN METABOLIC PANEL: CPT | Performed by: EMERGENCY MEDICINE

## 2024-04-15 PROCEDURE — 74176 CT ABD & PELVIS W/O CONTRAST: CPT

## 2024-04-15 PROCEDURE — 83690 ASSAY OF LIPASE: CPT | Performed by: EMERGENCY MEDICINE

## 2024-04-15 PROCEDURE — 25010000002 VANCOMYCIN HCL 1.25 G RECONSTITUTED SOLUTION 1 EACH VIAL: Performed by: EMERGENCY MEDICINE

## 2024-04-15 PROCEDURE — 85025 COMPLETE CBC W/AUTO DIFF WBC: CPT | Performed by: EMERGENCY MEDICINE

## 2024-04-15 PROCEDURE — 25010000002 METRONIDAZOLE 500 MG/100ML SOLUTION: Performed by: EMERGENCY MEDICINE

## 2024-04-15 PROCEDURE — 84484 ASSAY OF TROPONIN QUANT: CPT | Performed by: EMERGENCY MEDICINE

## 2024-04-15 PROCEDURE — 25810000003 SODIUM CHLORIDE 0.9 % SOLUTION 250 ML FLEX CONT: Performed by: EMERGENCY MEDICINE

## 2024-04-15 RX ORDER — BISACODYL 5 MG/1
5 TABLET, DELAYED RELEASE ORAL DAILY PRN
Status: DISCONTINUED | OUTPATIENT
Start: 2024-04-15 | End: 2024-04-18 | Stop reason: HOSPADM

## 2024-04-15 RX ORDER — SODIUM CHLORIDE 0.9 % (FLUSH) 0.9 %
10 SYRINGE (ML) INJECTION EVERY 12 HOURS SCHEDULED
Status: DISCONTINUED | OUTPATIENT
Start: 2024-04-15 | End: 2024-04-18 | Stop reason: HOSPADM

## 2024-04-15 RX ORDER — LEVOTHYROXINE SODIUM 112 UG/1
112 TABLET ORAL
Status: DISCONTINUED | OUTPATIENT
Start: 2024-04-16 | End: 2024-04-18 | Stop reason: HOSPADM

## 2024-04-15 RX ORDER — ASPIRIN 81 MG/1
81 TABLET, CHEWABLE ORAL DAILY
Status: DISCONTINUED | OUTPATIENT
Start: 2024-04-15 | End: 2024-04-18 | Stop reason: HOSPADM

## 2024-04-15 RX ORDER — ACETAMINOPHEN 500 MG
1000 TABLET ORAL EVERY 6 HOURS PRN
COMMUNITY

## 2024-04-15 RX ORDER — POLYETHYLENE GLYCOL 3350 17 G/17G
17 POWDER, FOR SOLUTION ORAL DAILY PRN
Status: DISCONTINUED | OUTPATIENT
Start: 2024-04-15 | End: 2024-04-18 | Stop reason: HOSPADM

## 2024-04-15 RX ORDER — DOCUSATE SODIUM 100 MG/1
100 CAPSULE, LIQUID FILLED ORAL 2 TIMES DAILY
COMMUNITY

## 2024-04-15 RX ORDER — ACETAMINOPHEN 325 MG/1
650 TABLET ORAL EVERY 4 HOURS PRN
Status: DISCONTINUED | OUTPATIENT
Start: 2024-04-15 | End: 2024-04-18 | Stop reason: HOSPADM

## 2024-04-15 RX ORDER — ATORVASTATIN CALCIUM 40 MG/1
40 TABLET, FILM COATED ORAL NIGHTLY
Status: DISCONTINUED | OUTPATIENT
Start: 2024-04-15 | End: 2024-04-18 | Stop reason: HOSPADM

## 2024-04-15 RX ORDER — LOSARTAN POTASSIUM 50 MG/1
50 TABLET ORAL
Status: DISCONTINUED | OUTPATIENT
Start: 2024-04-15 | End: 2024-04-18 | Stop reason: HOSPADM

## 2024-04-15 RX ORDER — SODIUM CHLORIDE 0.9 % (FLUSH) 0.9 %
10 SYRINGE (ML) INJECTION AS NEEDED
Status: DISCONTINUED | OUTPATIENT
Start: 2024-04-15 | End: 2024-04-18 | Stop reason: HOSPADM

## 2024-04-15 RX ORDER — BISACODYL 10 MG
10 SUPPOSITORY, RECTAL RECTAL DAILY PRN
Status: DISCONTINUED | OUTPATIENT
Start: 2024-04-15 | End: 2024-04-18 | Stop reason: HOSPADM

## 2024-04-15 RX ORDER — HEPARIN SODIUM 5000 [USP'U]/ML
5000 INJECTION, SOLUTION INTRAVENOUS; SUBCUTANEOUS EVERY 8 HOURS SCHEDULED
Status: DISCONTINUED | OUTPATIENT
Start: 2024-04-15 | End: 2024-04-18 | Stop reason: HOSPADM

## 2024-04-15 RX ORDER — SODIUM CHLORIDE 9 MG/ML
75 INJECTION, SOLUTION INTRAVENOUS CONTINUOUS
Status: DISCONTINUED | OUTPATIENT
Start: 2024-04-15 | End: 2024-04-18 | Stop reason: HOSPADM

## 2024-04-15 RX ORDER — AMOXICILLIN 250 MG
2 CAPSULE ORAL 2 TIMES DAILY PRN
Status: DISCONTINUED | OUTPATIENT
Start: 2024-04-15 | End: 2024-04-18 | Stop reason: HOSPADM

## 2024-04-15 RX ORDER — TERAZOSIN 1 MG/1
1 CAPSULE ORAL NIGHTLY
Status: DISCONTINUED | OUTPATIENT
Start: 2024-04-15 | End: 2024-04-18 | Stop reason: HOSPADM

## 2024-04-15 RX ORDER — ONDANSETRON 4 MG/1
4 TABLET, ORALLY DISINTEGRATING ORAL EVERY 6 HOURS PRN
Status: DISCONTINUED | OUTPATIENT
Start: 2024-04-15 | End: 2024-04-18 | Stop reason: HOSPADM

## 2024-04-15 RX ORDER — FAMOTIDINE 20 MG/1
20 TABLET, FILM COATED ORAL NIGHTLY
COMMUNITY

## 2024-04-15 RX ORDER — SERTRALINE HYDROCHLORIDE 25 MG/1
25 TABLET, FILM COATED ORAL DAILY
Status: DISCONTINUED | OUTPATIENT
Start: 2024-04-16 | End: 2024-04-18 | Stop reason: HOSPADM

## 2024-04-15 RX ORDER — SODIUM CHLORIDE 9 MG/ML
40 INJECTION, SOLUTION INTRAVENOUS AS NEEDED
Status: DISCONTINUED | OUTPATIENT
Start: 2024-04-15 | End: 2024-04-18 | Stop reason: HOSPADM

## 2024-04-15 RX ORDER — SACCHAROMYCES BOULARDII 250 MG
250 CAPSULE ORAL 2 TIMES DAILY
COMMUNITY

## 2024-04-15 RX ORDER — ONDANSETRON 2 MG/ML
4 INJECTION INTRAMUSCULAR; INTRAVENOUS EVERY 6 HOURS PRN
Status: DISCONTINUED | OUTPATIENT
Start: 2024-04-15 | End: 2024-04-18 | Stop reason: HOSPADM

## 2024-04-15 RX ORDER — METRONIDAZOLE 500 MG/100ML
500 INJECTION, SOLUTION INTRAVENOUS ONCE
Qty: 100 ML | Refills: 0 | Status: COMPLETED | OUTPATIENT
Start: 2024-04-15 | End: 2024-04-15

## 2024-04-15 RX ORDER — ACETAMINOPHEN 650 MG/1
650 SUPPOSITORY RECTAL EVERY 4 HOURS PRN
Status: DISCONTINUED | OUTPATIENT
Start: 2024-04-15 | End: 2024-04-18 | Stop reason: HOSPADM

## 2024-04-15 RX ORDER — LACTULOSE 10 G/15ML
20 SOLUTION ORAL EVERY 6 HOURS PRN
COMMUNITY

## 2024-04-15 RX ORDER — NITROFURANTOIN 25; 75 MG/1; MG/1
100 CAPSULE ORAL 2 TIMES DAILY
COMMUNITY
End: 2024-04-18 | Stop reason: HOSPADM

## 2024-04-15 RX ORDER — CARBOXYMETHYLCELLULOSE SODIUM 10 MG/ML
1 GEL OPHTHALMIC 2 TIMES DAILY
COMMUNITY

## 2024-04-15 RX ORDER — ACETAMINOPHEN 160 MG/5ML
650 SOLUTION ORAL EVERY 4 HOURS PRN
Status: DISCONTINUED | OUTPATIENT
Start: 2024-04-15 | End: 2024-04-18 | Stop reason: HOSPADM

## 2024-04-15 RX ORDER — DEXTROMETHORPHAN HBR AND GUAIFENESIN 5; 100 MG/5ML; MG/5ML
10 LIQUID ORAL EVERY 6 HOURS PRN
COMMUNITY

## 2024-04-15 RX ADMIN — VANCOMYCIN HYDROCHLORIDE 1250 MG: 1.25 INJECTION, POWDER, LYOPHILIZED, FOR SOLUTION INTRAVENOUS at 17:19

## 2024-04-15 RX ADMIN — HEPARIN SODIUM 5000 UNITS: 5000 INJECTION INTRAVENOUS; SUBCUTANEOUS at 17:20

## 2024-04-15 RX ADMIN — AZTREONAM 1000 MG: 1 INJECTION, POWDER, LYOPHILIZED, FOR SOLUTION INTRAMUSCULAR; INTRAVENOUS at 18:45

## 2024-04-15 RX ADMIN — ASPIRIN 81 MG: 81 TABLET, CHEWABLE ORAL at 17:21

## 2024-04-15 RX ADMIN — SODIUM CHLORIDE 1000 ML: 9 INJECTION, SOLUTION INTRAVENOUS at 11:37

## 2024-04-15 RX ADMIN — Medication 10 ML: at 20:43

## 2024-04-15 RX ADMIN — HEPARIN SODIUM 5000 UNITS: 5000 INJECTION INTRAVENOUS; SUBCUTANEOUS at 21:06

## 2024-04-15 RX ADMIN — SODIUM CHLORIDE 100 ML/HR: 9 INJECTION, SOLUTION INTRAVENOUS at 17:11

## 2024-04-15 RX ADMIN — METRONIDAZOLE 500 MG: 500 INJECTION, SOLUTION INTRAVENOUS at 21:05

## 2024-04-15 RX ADMIN — ACETAMINOPHEN 650 MG: 325 TABLET ORAL at 21:06

## 2024-04-15 NOTE — ED PROVIDER NOTES
Subjective   History of Present Illness  89-year-old female who was brought in due to elevated white blood cell count.  The patient was recently diagnosed with urinary tract infection reportedly had a white blood cell count of 13,000.  She had repeat labs checked recently and had a count of 18,000 and therefore she was sent here by Dana Starr for further workup.  She chronically sleeps most of the time and has poor oral intake per a family who is present.  The family does not necessarily notice anything different about the patient's overall appearance.  The patient himself describes some mild abdominal pain on exam and does report some mild shortness of breath.  She denies cough.  No upper respiratory congestion.  No chest pain.  No dysuria.  No change in bowel function clued no diarrhea or blood in the stool.  History is limited from the patient herself secondary to baseline dementia/altered mental status.      Review of Systems   Constitutional:  Positive for activity change, appetite change and fatigue. Negative for chills and fever.   HENT:  Negative for congestion, ear pain, postnasal drip, sinus pressure and sore throat.    Eyes:  Negative for pain, redness and visual disturbance.   Respiratory:  Positive for shortness of breath. Negative for cough and chest tightness.    Cardiovascular:  Negative for chest pain, palpitations and leg swelling.   Gastrointestinal:  Positive for abdominal pain. Negative for anal bleeding, blood in stool, diarrhea, nausea and vomiting.   Endocrine: Negative for polydipsia and polyuria.   Genitourinary:  Negative for difficulty urinating, dysuria, frequency and urgency.   Musculoskeletal:  Negative for arthralgias, back pain and neck pain.   Skin:  Negative for pallor and rash.   Allergic/Immunologic: Negative for environmental allergies and immunocompromised state.   Neurological:  Negative for dizziness, weakness and headaches.   Hematological:  Negative for  adenopathy.   Psychiatric/Behavioral:  Negative for confusion, self-injury and suicidal ideas. The patient is not nervous/anxious.    All other systems reviewed and are negative.      Past Medical History:   Diagnosis Date    Depression     Disease of thyroid gland     Hypertension        Allergies   Allergen Reactions    Penicillins Anaphylaxis     Per [facility] records.   Has tolerated ceftriaxone 4/17/24    Levaquin [Levofloxacin] Other (See Comments)     Other      Sulfa Antibiotics Other (See Comments)     Other         History reviewed. No pertinent surgical history.    History reviewed. No pertinent family history.    Social History     Socioeconomic History    Marital status:    Tobacco Use    Smoking status: Never    Smokeless tobacco: Never   Vaping Use    Vaping status: Never Used   Substance and Sexual Activity    Alcohol use: Not Currently    Drug use: Never    Sexual activity: Not Currently           Objective   Physical Exam  Vitals and nursing note reviewed.   Constitutional:       General: She is not in acute distress.     Appearance: Normal appearance. She is well-developed. She is ill-appearing. She is not toxic-appearing or diaphoretic.      Comments: Mildly warm to touch   HENT:      Head: Normocephalic and atraumatic.      Right Ear: External ear normal.      Left Ear: External ear normal.      Nose: Nose normal.      Mouth/Throat:      Mouth: Mucous membranes are dry.      Comments: Dry mucous membranes.  Eyes:      General: Lids are normal.      Pupils: Pupils are equal, round, and reactive to light.   Neck:      Trachea: No tracheal deviation.   Cardiovascular:      Rate and Rhythm: Normal rate and regular rhythm.      Pulses: No decreased pulses.      Heart sounds: Normal heart sounds. No murmur heard.     No friction rub. No gallop.   Pulmonary:      Effort: Pulmonary effort is normal. No respiratory distress.      Breath sounds: Normal breath sounds. No decreased breath sounds, wheezing,  rhonchi or rales.   Abdominal:      General: Bowel sounds are normal.      Palpations: Abdomen is soft.      Tenderness: There is abdominal tenderness in the suprapubic area. There is no guarding or rebound.   Musculoskeletal:         General: No deformity. Normal range of motion.      Cervical back: Normal range of motion and neck supple.   Lymphadenopathy:      Cervical: No cervical adenopathy.   Skin:     General: Skin is warm and dry.      Findings: No rash.   Neurological:      Mental Status: Mental status is at baseline. She is lethargic, disoriented and confused.      GCS: GCS eye subscore is 4. GCS verbal subscore is 4. GCS motor subscore is 6.      Cranial Nerves: No cranial nerve deficit.      Sensory: No sensory deficit.   Psychiatric:         Speech: Speech normal.         Behavior: Behavior normal.         Thought Content: Thought content normal.         Judgment: Judgment normal.         Procedures           ED Course  ED Course as of 04/19/24 0720   Mon Apr 15, 2024   1341 The patient was sent here because of an elevated white blood cell count and the concern for urinary tract infection.  She does have mild leukocytosis greater than 11,000.  Urine does not appear consistent with infection.  The patient does complain of some lower abdominal pain.  CT scan abdomen pelvis reports possible diverticulitis.  The gallbladder is reported to be unremarkable with no reported abnormalities associate with the pancreas.  However the patient does have elevated LFTs, alk phos, and lipase level.  On exam the patient seems to have some mild lower abdominal pain.  No peritoneal signs.  Given her advanced age I like to admit for fluid resuscitation and antibiotics.  She may need MRCP evaluation inpatient.  Vital signs are nonconcerning she does not appear septic or ill. [NS]      ED Course User Index  [NS] Claudia Verduzco MD                                             Medical Decision Making  Differential  diagnosis includes urinary tract infection, dehydration, bowel obstruction, diverticulitis, renal sufficiency, electrolyte abnormality, other unspecified etiology.    CT scan abdomen pelvis reports possible diverticulitis.    Labs show slightly white blood cell count, elevated lactic acid.  The patient has decreased oral intake based off story and baseline altered mental status.  Viral respiratory panel is negative.  Lactic acid level corrected to normal after IV fluids.    Labs also show elevated AST, ALT, and alkaline phosphatase.  Total bilirubin level is normal.    I discussed the patient with the hospitalist, who will consult on the patient to determine status of admission.      Problems Addressed:  Acute dehydration: complicated acute illness or injury with systemic symptoms  Acute diverticulitis: complicated acute illness or injury with systemic symptoms  Elevated LFTs: complicated acute illness or injury with systemic symptoms  Elevated lipase: complicated acute illness or injury with systemic symptoms    Amount and/or Complexity of Data Reviewed  Independent Historian: friend     Details: Daughter provides additional history.  External Data Reviewed: labs and radiology.  Labs: ordered. Decision-making details documented in ED Course.  Radiology: ordered and independent interpretation performed. Decision-making details documented in ED Course.  ECG/medicine tests: ordered and independent interpretation performed. Decision-making details documented in ED Course.     Details: EKG independently interpreted social sinus bradycardia without acute ischemic changes.    Risk  Prescription drug management.  Decision regarding hospitalization.        Final diagnoses:   Acute diverticulitis   Acute dehydration   Elevated lipase   Elevated LFTs       ED Disposition  ED Disposition       ED Disposition   Decision to Admit    Condition   --    Comment   Level of Care: Telemetry [5]   Diagnosis: Diverticulitis [134636]    Admitting Physician: ANDREAS VAZQUEZ II [[id number]]                 Brenton Clay MD  [address] [place] [address] [phone]    Follow up in 1 week(s)  appointment with YANE Shelton  April 25, 2024 at 2:45 pm  Thursday    [address] [place] [address] [phone]             Medication List        New Prescriptions      cefuroxime 500 MG tablet  Commonly known as: CEFTIN  Take 0.5 tablets by mouth 2 (Two) Times a Day for 5 doses.     ondansetron ODT 4 MG disintegrating tablet  Commonly known as: ZOFRAN-ODT  Take 1 tablet by mouth Every 6 (Six) Hours As Needed for Nausea or Vomiting.            Stop      nitrofurantoin (macrocrystal-monohydrate) 100 MG capsule  Commonly known as: MACROBID               Where to Get Your Medications        These medications were sent to [facility] [place] [place] - [address] - [phone] [facility] [phone]   [address] [place] [address]      Phone: [phone]   terazosin 1 MG capsule       Information about where to get these medications is not yet available    Ask your nurse or doctor about these medications  cefuroxime 500 MG tablet  ondansetron ODT 4 MG disintegrating tablet            Claudia Verduzco MD  04/19/24 8097

## 2024-04-15 NOTE — ED NOTES
" Nehal Parker    Nursing Report ED to Floor:  Mental status: aox1  Ambulatory status: bedbound  Oxygen Therapy:  RA  Cardiac Rhythm: afib  Admitted from: [facility]  Safety Concerns:  none   Social Issues: none  ED Room #:  5    ED Nurse Phone Extension - [phone] or may call [phone].      HPI:   Chief Complaint   Patient presents with    Urinary Tract Infection       Past Medical History:  Past Medical History:   Diagnosis Date    Depression     Disease of thyroid gland     Hypertension         Past Surgical History:  History reviewed. No pertinent surgical history.     Admitting Doctor:   Ramona Meade II, DO    Consulting Provider(s):  Consults       No orders found from 3/17/2024 to 4/16/2024.             Admitting Diagnosis:   The primary encounter diagnosis was Acute diverticulitis. Diagnoses of Acute dehydration, Elevated lipase, and Elevated LFTs were also pertinent to this visit.    Most Recent Vitals:   Vitals:    04/15/24 1054 04/15/24 1130 04/15/24 1230   BP: 105/68 106/75 118/80   Patient Position: Lying     Pulse: 78 87 88   Resp: 18     Temp: 96.7 °F (35.9 °C)     TempSrc: Axillary     SpO2: 100% 95% 96%   Weight: 54.4 kg (120 lb)     Height: 160 cm (63\")         Active LDAs/IV Access:   Lines, Drains & Airways       Active LDAs       Name Placement date Placement time Site Days    Peripheral IV 09/15/22 0200 Anterior;Right;Upper Arm 09/15/22  0200  Arm  578    Peripheral IV 04/15/24 1134 Right Antecubital 04/15/24  1134  Antecubital  less than 1    External Urinary Catheter 09/12/22  0630  --  581                    Labs (abnormal labs have a star):   Labs Reviewed   COMPREHENSIVE METABOLIC PANEL - Abnormal; Notable for the following components:       Result Value    Glucose 163 (*)     BUN 27 (*)     Creatinine 0.55 (*)     Calcium 10.0 (*)     Albumin 3.3 (*)     ALT (SGPT) 102 (*)     AST (SGOT) 113 (*)     Alkaline Phosphatase 393 (*)     BUN/Creatinine Ratio 49.1 (*)     All other " components within normal limits    Narrative:     GFR Normal >60  Chronic Kidney Disease <60  Kidney Failure <15    The GFR formula is only valid for adults with stable renal function between ages 18 and 70.   LIPASE - Abnormal; Notable for the following components:    Lipase 119 (*)     All other components within normal limits   URINALYSIS W/ CULTURE IF INDICATED - Abnormal; Notable for the following components:    Color, UA Dark Yellow (*)     Appearance, UA Cloudy (*)     Ketones, UA 15 mg/dL (1+) (*)     Bilirubin, UA Small (1+) (*)     Protein, UA 30 mg/dL (1+) (*)     Leuk Esterase, UA Small (1+) (*)     All other components within normal limits    Narrative:     In absence of clinical symptoms, the presence of pyuria, bacteria, and/or nitrites on the urinalysis result does not correlate with infection.   LACTIC ACID, PLASMA - Abnormal; Notable for the following components:    Lactate 2.2 (*)     All other components within normal limits   CBC WITH AUTO DIFFERENTIAL - Abnormal; Notable for the following components:    WBC 11.71 (*)     Neutrophil % 84.0 (*)     Lymphocyte % 5.3 (*)     Monocyte % 4.9 (*)     Neutrophils, Absolute 9.84 (*)     Lymphocytes, Absolute 0.62 (*)     Eosinophils, Absolute 0.58 (*)     Immature Grans, Absolute 0.06 (*)     All other components within normal limits   URINALYSIS, MICROSCOPIC ONLY - Abnormal; Notable for the following components:    WBC, UA 11-20 (*)     Squamous Epithelial Cells, UA 7-12 (*)     All other components within normal limits   RESPIRATORY PANEL PCR W/ COVID-19 (SARS-COV-2), NP SWAB IN UTM/VTP, 2 HR TAT - Normal    Narrative:     In the setting of a positive respiratory panel with a viral infection PLUS a negative procalcitonin without other underlying concern for bacterial infection, consider observing off antibiotics or discontinuation of antibiotics and continue supportive care. If the respiratory panel is positive for atypical bacterial infection  (Bordetella pertussis, Chlamydophila pneumoniae, or Mycoplasma pneumoniae), consider antibiotic de-escalation to target atypical bacterial infection.   SINGLE HS TROPONIN T - Normal    Narrative:     High Sensitive Troponin T Reference Range:  <14.0 ng/L- Negative Female for AMI  <22.0 ng/L- Negative Male for AMI  >=14 - Abnormal Female indicating possible myocardial injury.  >=22 - Abnormal Male indicating possible myocardial injury.   Clinicians would have to utilize clinical acumen, EKG, Troponin, and serial changes to determine if it is an Acute Myocardial Infarction or myocardial injury due to an underlying chronic condition.        MAGNESIUM - Normal   COVID PRE-OP / PRE-PROCEDURE SCREENING ORDER (NO ISOLATION)    Narrative:     The following orders were created for panel order COVID PRE-OP / PRE-PROCEDURE SCREENING ORDER (NO ISOLATION) - Swab, Nasopharynx.  Procedure                               Abnormality         Status                     ---------                               -----------         ------                     Respiratory Panel PCR w/...[[id number]]  Normal              Final result                 Please view results for these tests on the individual orders.   URINE CULTURE   BLOOD CULTURE   BLOOD CULTURE   MRSA SCREEN, PCR   LACTIC ACID, REFLEX   CBC AND DIFFERENTIAL    Narrative:     The following orders were created for panel order CBC & Differential.  Procedure                               Abnormality         Status                     ---------                               -----------         ------                     CBC Auto Differential[[id number]]        Abnormal            Final result                 Please view results for these tests on the individual orders.       Meds Given in ED:   Medications   sodium chloride 0.9 % flush 10 mL (has no administration in time range)   aztreonam (AZACTAM) 2 g in sodium chloride 0.9 % 100 mL MBP (has no administration in time range)    metroNIDAZOLE (FLAGYL) IVPB 500 mg (has no administration in time range)   Vancomycin HCl 1,250 mg in sodium chloride 0.9 % 250 mL VTB (has no administration in time range)   sodium chloride 0.9 % bolus 1,000 mL (0 mL Intravenous Stopped 4/15/24 1224)           Last NIH score:                                                          Dysphagia screening results:        [place] Coma Scale:  No data recorded     CIWA:        Restraint Type:            Isolation Status:  No active isolations

## 2024-04-15 NOTE — H&P
[facility] Medicine Services  HISTORY AND PHYSICAL    Patient Name: Nehal Parker  : 2/15/1926  MRN: [id number]  Primary Care Physician: Brenton Clay MD  Date of admission: 4/15/2024      Subjective   Subjective     Chief Complaint: weakness    HPI:  Nehal Parker is a 89 y.o. female sent in from [facility] for elevated WBC. Patient diagnosed with UTI  and started on oral antibiotics. Per her daughter, she has been more lethargic than usual over this past week but aside from that no notable changes. Does say that patient typically doesn't eat/drink very much. Patient very sleepy and not really communicating with me.    Personal History     Past Medical History:   Diagnosis Date    Depression     Disease of thyroid gland     Hypertension            History reviewed. No pertinent surgical history.    Family History: Noncontributory    Social History:  reports that she has never smoked. She has never used smokeless tobacco. She reports that she does not currently use alcohol. She reports that she does not use drugs.  Social History     Social History Narrative    Not on file       Medications:  Available home medication information reviewed.  Levothyroxine Sodium, aspirin, atorvastatin, losartan, sertraline, and terazosin    Allergies   Allergen Reactions    Penicillins Anaphylaxis     Per [facility] records.       Levaquin [Levofloxacin] Other (See Comments)     Other      Sulfa Antibiotics Other (See Comments)     Other         Objective   Objective     Vital Signs:   Temp:  [96.7 °F (35.9 °C)] 96.7 °F (35.9 °C)  Heart Rate:  [78-88] 88  Resp:  [18] 18  BP: (105-118)/(68-80) 118/80       Physical Exam   Constitutional: Awake, alert, sleepy, elderly female  Eyes: PERRLA, sclerae anicteric, no conjunctival injection  HENT: NCAT, mucous membranes moist  Neck: Supple, no thyromegaly, no lymphadenopathy, trachea midline  Respiratory: Clear to auscultation bilaterally, nonlabored  respirations   Cardiovascular: RRR, no murmurs, rubs, or gallops, palpable pedal pulses bilaterally  Gastrointestinal: Positive bowel sounds, soft, nontender, nondistended  Musculoskeletal: No bilateral ankle edema, no clubbing or cyanosis to extremities  Psychiatric: JOSÉ MIGUEL, minimally interactive  Neurologic: JOSÉ MIGUEL, awake but not really interactive or following commands.  Skin: No rashes     Result Review:  I have personally reviewed the results from the time of this admission to 4/15/2024 14:01 EDT and agree with these findings:  []  Laboratory list / accordion  []  Microbiology  []  Radiology  []  EKG/Telemetry   []  Cardiology/Vascular   []  Pathology  []  Old records  []  Other:  Most notable findings include:       LAB RESULTS:      Lab 04/15/24  1132   WBC 11.71*   HEMOGLOBIN 12.2   HEMATOCRIT 38.2   PLATELETS 142   NEUTROS ABS 9.84*   IMMATURE GRANS (ABS) 0.06*   LYMPHS ABS 0.62*   MONOS ABS 0.57   EOS ABS 0.58*   MCV 89.7   LACTATE 2.2*         Lab 04/15/24  1132   SODIUM 140   POTASSIUM 4.1   CHLORIDE 105   CO2 27.0   ANION GAP 8.0   BUN 27*   CREATININE 0.55*   EGFR 83.0   GLUCOSE 163*   CALCIUM 10.0*   MAGNESIUM 2.0         Lab 04/15/24  1132   TOTAL PROTEIN 6.7   ALBUMIN 3.3*   GLOBULIN 3.4   ALT (SGPT) 102*   AST (SGOT) 113*   BILIRUBIN 0.4   ALK PHOS 393*   LIPASE 119*         Lab 04/15/24  1132   HSTROP T 13                 UA          4/15/2024    11:55   Urinalysis   Squamous Epithelial Cells, UA 7-12    Specific Gravity, UA 1.021    Ketones, UA 15 mg/dL (1+)    Blood, UA Negative    Leukocytes, UA Small (1+)    Nitrite, UA Negative    RBC, UA 0-2    WBC, UA 11-20    Bacteria, UA None Seen        Microbiology Results (last 10 days)       Procedure Component Value - Date/Time    COVID PRE-OP / PRE-PROCEDURE SCREENING ORDER (NO ISOLATION) - Swab, Nasopharynx [[id number]]  (Normal) Collected: 04/15/24 1133    Lab Status: Final result Specimen: Swab from Nasopharynx Updated: 04/15/24 1236    Narrative:       The following orders were created for panel order COVID PRE-OP / PRE-PROCEDURE SCREENING ORDER (NO ISOLATION) - Swab, Nasopharynx.  Procedure                               Abnormality         Status                     ---------                               -----------         ------                     Respiratory Panel PCR w/...[[id number]]  Normal              Final result                 Please view results for these tests on the individual orders.    Respiratory Panel PCR w/COVID-19(SARS-CoV-2) MARY/SHIRAZ/PETER/PAD/COR/LULÚ In-House, NP Swab in UTM/VTM, 2 HR TAT - Swab, Nasopharynx [[id number]]  (Normal) Collected: 04/15/24 1133    Lab Status: Final result Specimen: Swab from Nasopharynx Updated: 04/15/24 1236     ADENOVIRUS, PCR Not Detected     Coronavirus 229E Not Detected     Coronavirus HKU1 Not Detected     Coronavirus NL63 Not Detected     Coronavirus OC43 Not Detected     COVID19 Not Detected     Human Metapneumovirus Not Detected     Human Rhinovirus/Enterovirus Not Detected     Influenza A PCR Not Detected     Influenza B PCR Not Detected     Parainfluenza Virus 1 Not Detected     Parainfluenza Virus 2 Not Detected     Parainfluenza Virus 3 Not Detected     Parainfluenza Virus 4 Not Detected     RSV, PCR Not Detected     Bordetella pertussis pcr Not Detected     Bordetella parapertussis PCR Not Detected     Chlamydophila pneumoniae PCR Not Detected     Mycoplasma pneumo by PCR Not Detected    Narrative:      In the setting of a positive respiratory panel with a viral infection PLUS a negative procalcitonin without other underlying concern for bacterial infection, consider observing off antibiotics or discontinuation of antibiotics and continue supportive care. If the respiratory panel is positive for atypical bacterial infection (Bordetella pertussis, Chlamydophila pneumoniae, or Mycoplasma pneumoniae), consider antibiotic de-escalation to target atypical bacterial infection.            CT Abdomen Pelvis  Without Contrast    Result Date: 4/15/2024  CT ABDOMEN PELVIS WO CONTRAST Date of Exam: 4/15/2024 1:00 PM EDT Indication: confusion/abdominal pain. Comparison: CT of the abdomen and pelvis dated 9/13/2022 Technique: Axial CT images were obtained of the abdomen and pelvis without the administration of contrast. Reconstructed coronal and sagittal images were also obtained. Automated exposure control and iterative construction methods were used. Findings: Liver: The liver is unremarkable in morphology. Probable cysts within the left hepatic lobe. Further evaluation of the liver is limited without IV contrast. No biliary dilation is seen. Gallbladder: Unremarkable. Pancreas: The pancreas is atrophic. Spleen: Unremarkable. Adrenal glands: Unremarkable. Genitourinary tract: The kidneys appear unremarkable. No hydronephrosis is seen. The visualized portions of the ureters are unremarkable. No urinary tract calculi are seen. Distal ureters and urinary bladder are partially obscured due to beam hardening artifact arising from bilateral hip arthroplasties. There appears to be a left adnexal cyst measuring approximately 2.7 cm. Gastrointestinal tract: Colonic diverticulosis is present. Questionable wall thickening within the proximal sigmoid colon versus underdistention (series 2, image 103. Mild sigmoid diverticulitis cannot be entirely excluded. Please correlate with patient's symptomatology. No free air or abscess formation is identified. Large hiatal hernia. No evidence of bowel obstruction. Appendix: The appendix is not identified. Other findings: No free air or free fluid. No pathologically enlarged lymph nodes are seen. Vascular calcifications are present. Bones and soft tissues: No acute osseous lesion is identified. Bones are demineralized. There are degenerative changes within the spine. 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 Superficial soft tissues demonstrate no acute abnormality. Lung bases: Large hiatal  hernia is partially imaged. Mitral annulus calcification is noted. Scattered bibasilar fibrotic changes.     Impression: Impression: 1.Examination is limited due to lack of IV contrast administration. 2.Colonic diverticulosis. Questionable wall thickening versus underdistention of the proximal sigmoid colon. Mild diverticulitis may be considered in the appropriate clinical setting. Please correlate with patient's symptomatology. No free air or abscess  formation identified. 3.Large hiatal hernia. 4.Left adnexal cyst measuring approximately 2.7 cm. 5.Nonvisualized appendix. 6.Additional findings as detailed above. Electronically Signed: Mitesh Medeiros MD  4/15/2024 1:13 PM EDT  Workstation ID: [id number]    XR Chest 1 View    Result Date: 4/15/2024  XR CHEST 1 VW Date of Exam: 4/15/2024 11:59 AM EDT Indication: dyspnea/fatigue Comparison: Chest radiograph 9/11/2022. Findings: Enlarged cardiac silhouette, unchanged. Thoracic aortic calcifications. Diffusely coarsened interstitium, unchanged from prior exam and likely related to chronic/senescent changes. No focal consolidation. No pleural effusion or pneumothorax. Osseous structures are unchanged. Hiatal hernia.     Impression: Impression: Chronic changes of the lungs without evidence of acute cardiopulmonary disease. Electronically Signed: Mickey Pressley MD  4/15/2024 12:09 PM EDT  Workstation ID: [id number]     Results for orders placed during the hospital encounter of 09/11/22    Adult Transthoracic Echo Complete W/ Cont if Necessary Per Protocol (With Agitated Saline)    Interpretation Summary  · Left ventricular wall thickness is consistent with mild concentric hypertrophy.  · Left ventricular ejection fraction appears to be 56 - 60%.  · Left atrial volume is mildly increased.  · There is calcification of the aortic valve.  · Mild dilation of the aortic root is present. Mild dilation of the sinuses of Valsalva is present. Mild dilation of the ascending aorta is  "present.  · MAC and calcification of subvalvular structures but no significant MS  · Mild MR      Assessment & Plan   Assessment & Plan       Diverticulitis    Generalized weakness    Essential hypertension    Hypothyroidism (acquired)    History of CVA (cerebrovascular accident)    Elevated liver enzymes    89 y/o female presenting from [facility] with ongoing leukocytosis and decreased responsiveness following recent course of abx for UTI.    Generalized weakness  Questionable diverticulitis vs partially treated UTI  Dehydration  --Lab, micro, imaging not overly impressive. Per radiology read \"may have diverticulitis\" however her exam is not impressive. Will continue short course of abx but suspect will not need abx at d/c.  --IVF. Reg diet.  --PT/OT.    Elevated LFTs  --Likely result of dehydration and statin use. Holding statin and hydrating as above. CMP in am.    Hypothyroidism  HTN  Hx CVA    DVT prophylaxis:  Medical DVT prophylaxis orders are signed and held.            CODE STATUS:    Code Status and Medical Interventions:   Ordered at: 04/15/24 1400     Medical Intervention Limits:    NO intubation (DNI)     Code Status (Patient has no pulse and is not breathing):    No CPR (Do Not Attempt to Resuscitate)     Medical Interventions (Patient has pulse or is breathing):    Limited Support       Expected Discharge   Expected discharge date/ time has not been documented.     Ramona Meade II, DO  04/15/24   "

## 2024-04-15 NOTE — Clinical Note
Level of Care: Telemetry [5]   Diagnosis: Diverticulitis [663326]   Admitting Physician: ANDREAS VAZQUEZ II [[id number]   Certification: I Certify That Inpatient Hospital Services Are Medically Necessary For Greater Than 2 Midnights

## 2024-04-15 NOTE — LETTER
EMS Transport Request  For use at [facility], [place], [place], [place], and [place] only   Patient Name: Nehal Parker : 2/15/1926   Weight:54.4 kg (120 lb) Pick-up Location: [facility] BLS/ALS: BLS   Insurance: MEDICARE Auth End Date:    Pre-Cert #: D/C Summary complete:    Destination: [facility]   Contact Precautions:    Equipment (O2, Fluids, etc.):    Arrive By Date/Time: 24 afternoon, doctor wants to make sure she is medically ready in the morning before sending her out.  Stretcher/WC: Stretcher   CM Requesting: Tanya Marinelli RN Ext: [phone]   Notes/Medical Necessity:      ______________________________________________________________________    *Only 2 patient bags OR 1 carry-on size bag are permitted.  Wheelchairs and walkers CANNOT transported with the patient. Acknowledge: Yes

## 2024-04-16 LAB
ALBUMIN SERPL-MCNC: 3 G/DL (ref 3.5–5.2)
ALBUMIN/GLOB SERPL: 1 G/DL
ALP SERPL-CCNC: 353 U/L (ref 39–117)
ALT SERPL W P-5'-P-CCNC: 80 U/L (ref 1–33)
ANION GAP SERPL CALCULATED.3IONS-SCNC: 11 MMOL/L (ref 5–15)
AST SERPL-CCNC: 101 U/L (ref 1–32)
BACTERIA SPEC AEROBE CULT: NORMAL
BILIRUB SERPL-MCNC: 0.3 MG/DL (ref 0–1.2)
BUN SERPL-MCNC: 17 MG/DL (ref 8–23)
BUN/CREAT SERPL: 51.5 (ref 7–25)
CALCIUM SPEC-SCNC: 8.6 MG/DL (ref 8.2–9.6)
CHLORIDE SERPL-SCNC: 108 MMOL/L (ref 98–107)
CO2 SERPL-SCNC: 20 MMOL/L (ref 22–29)
CREAT SERPL-MCNC: 0.33 MG/DL (ref 0.57–1)
DEPRECATED RDW RBC AUTO: 50.8 FL (ref 37–54)
EGFRCR SERPLBLD CKD-EPI 2021: 93.8 ML/MIN/1.73
ERYTHROCYTE [DISTWIDTH] IN BLOOD BY AUTOMATED COUNT: 14.5 % (ref 12.3–15.4)
GLOBULIN UR ELPH-MCNC: 2.9 GM/DL
GLUCOSE SERPL-MCNC: 79 MG/DL (ref 65–99)
HCT VFR BLD AUTO: 43.6 % (ref 34–46.6)
HGB BLD-MCNC: 13 G/DL (ref 12–15.9)
MAGNESIUM SERPL-MCNC: 1.6 MG/DL (ref 1.7–2.3)
MCH RBC QN AUTO: 28.6 PG (ref 26.6–33)
MCHC RBC AUTO-ENTMCNC: 29.8 G/DL (ref 31.5–35.7)
MCV RBC AUTO: 95.8 FL (ref 79–97)
MRSA DNA SPEC QL NAA+PROBE: NEGATIVE
PLATELET # BLD AUTO: 117 10*3/MM3 (ref 140–450)
PMV BLD AUTO: 10.5 FL (ref 6–12)
POTASSIUM SERPL-SCNC: 3.5 MMOL/L (ref 3.5–5.2)
POTASSIUM SERPL-SCNC: 3.5 MMOL/L (ref 3.5–5.2)
PROT SERPL-MCNC: 5.9 G/DL (ref 6–8.5)
RBC # BLD AUTO: 4.55 10*6/MM3 (ref 3.77–5.28)
SODIUM SERPL-SCNC: 139 MMOL/L (ref 136–145)
TSH SERPL DL<=0.05 MIU/L-ACNC: 2.54 UIU/ML (ref 0.27–4.2)
WBC NRBC COR # BLD AUTO: 11.78 10*3/MM3 (ref 3.4–10.8)

## 2024-04-16 PROCEDURE — 25010000002 CEFTRIAXONE PER 250 MG: Performed by: INTERNAL MEDICINE

## 2024-04-16 PROCEDURE — 84443 ASSAY THYROID STIM HORMONE: CPT | Performed by: INTERNAL MEDICINE

## 2024-04-16 PROCEDURE — 97530 THERAPEUTIC ACTIVITIES: CPT

## 2024-04-16 PROCEDURE — 97162 PT EVAL MOD COMPLEX 30 MIN: CPT

## 2024-04-16 PROCEDURE — 25010000002 HEPARIN (PORCINE) PER 1000 UNITS: Performed by: INTERNAL MEDICINE

## 2024-04-16 PROCEDURE — 25010000002 POTASSIUM CHLORIDE 10 MEQ/100ML SOLUTION: Performed by: INTERNAL MEDICINE

## 2024-04-16 PROCEDURE — 25010000002 AZTREONAM PER 500 MG

## 2024-04-16 PROCEDURE — 87641 MR-STAPH DNA AMP PROBE: CPT

## 2024-04-16 PROCEDURE — 97166 OT EVAL MOD COMPLEX 45 MIN: CPT

## 2024-04-16 PROCEDURE — 85027 COMPLETE CBC AUTOMATED: CPT | Performed by: INTERNAL MEDICINE

## 2024-04-16 PROCEDURE — 80053 COMPREHEN METABOLIC PANEL: CPT | Performed by: INTERNAL MEDICINE

## 2024-04-16 PROCEDURE — 25810000003 SODIUM CHLORIDE 0.9 % SOLUTION: Performed by: INTERNAL MEDICINE

## 2024-04-16 PROCEDURE — 84132 ASSAY OF SERUM POTASSIUM: CPT | Performed by: INTERNAL MEDICINE

## 2024-04-16 PROCEDURE — 99232 SBSQ HOSP IP/OBS MODERATE 35: CPT | Performed by: INTERNAL MEDICINE

## 2024-04-16 PROCEDURE — 92610 EVALUATE SWALLOWING FUNCTION: CPT

## 2024-04-16 PROCEDURE — 83735 ASSAY OF MAGNESIUM: CPT | Performed by: INTERNAL MEDICINE

## 2024-04-16 PROCEDURE — 87102 FUNGUS ISOLATION CULTURE: CPT | Performed by: INTERNAL MEDICINE

## 2024-04-16 RX ORDER — POTASSIUM CHLORIDE 7.45 MG/ML
10 INJECTION INTRAVENOUS
Status: COMPLETED | OUTPATIENT
Start: 2024-04-16 | End: 2024-04-16

## 2024-04-16 RX ADMIN — Medication 10 ML: at 23:04

## 2024-04-16 RX ADMIN — POTASSIUM CHLORIDE 10 MEQ: 7.46 INJECTION, SOLUTION INTRAVENOUS at 11:53

## 2024-04-16 RX ADMIN — SODIUM CHLORIDE 75 ML/HR: 9 INJECTION, SOLUTION INTRAVENOUS at 23:04

## 2024-04-16 RX ADMIN — ACETAMINOPHEN 650 MG: 325 TABLET ORAL at 23:03

## 2024-04-16 RX ADMIN — POTASSIUM CHLORIDE 10 MEQ: 7.46 INJECTION, SOLUTION INTRAVENOUS at 16:23

## 2024-04-16 RX ADMIN — HEPARIN SODIUM 5000 UNITS: 5000 INJECTION INTRAVENOUS; SUBCUTANEOUS at 23:03

## 2024-04-16 RX ADMIN — AZTREONAM 1000 MG: 1 INJECTION, POWDER, LYOPHILIZED, FOR SOLUTION INTRAMUSCULAR; INTRAVENOUS at 10:48

## 2024-04-16 RX ADMIN — HEPARIN SODIUM 5000 UNITS: 5000 INJECTION INTRAVENOUS; SUBCUTANEOUS at 06:22

## 2024-04-16 RX ADMIN — SODIUM CHLORIDE 100 ML/HR: 9 INJECTION, SOLUTION INTRAVENOUS at 10:59

## 2024-04-16 RX ADMIN — HEPARIN SODIUM 5000 UNITS: 5000 INJECTION INTRAVENOUS; SUBCUTANEOUS at 13:03

## 2024-04-16 RX ADMIN — ASPIRIN 81 MG: 81 TABLET, CHEWABLE ORAL at 10:47

## 2024-04-16 RX ADMIN — LEVOTHYROXINE SODIUM 112 MCG: 112 TABLET ORAL at 06:22

## 2024-04-16 RX ADMIN — POTASSIUM CHLORIDE 10 MEQ: 7.46 INJECTION, SOLUTION INTRAVENOUS at 10:57

## 2024-04-16 RX ADMIN — AZTREONAM 1000 MG: 1 INJECTION, POWDER, LYOPHILIZED, FOR SOLUTION INTRAMUSCULAR; INTRAVENOUS at 01:34

## 2024-04-16 RX ADMIN — Medication 10 ML: at 10:48

## 2024-04-16 RX ADMIN — SERTRALINE HYDROCHLORIDE 25 MG: 25 TABLET ORAL at 10:47

## 2024-04-16 RX ADMIN — SODIUM CHLORIDE 100 ML/HR: 9 INJECTION, SOLUTION INTRAVENOUS at 01:37

## 2024-04-16 RX ADMIN — SODIUM CHLORIDE 1000 MG: 900 INJECTION INTRAVENOUS at 14:02

## 2024-04-16 RX ADMIN — POTASSIUM CHLORIDE 10 MEQ: 7.46 INJECTION, SOLUTION INTRAVENOUS at 12:58

## 2024-04-16 NOTE — CASE MANAGEMENT/SOCIAL WORK
Discharge Planning Assessment  [facility]     Patient Name: Nehal Parker  MRN: [id number]  Today's Date: 4/16/2024    Admit Date: 4/15/2024    Plan: [facility]   Discharge Needs Assessment       Row Name 04/16/24 0920       Living Environment    People in Home other (see comments)  lives at [facility]    Current Living Arrangements extended care facility    Potentially Unsafe Housing Conditions none    In the past 12 months has the electric, gas, oil, or water company threatened to shut off services in your home? No    Primary Care Provided by other (see comments)  [facility]    Provides Primary Care For no one    Family Caregiver if Needed none    Able to Return to Prior Arrangements yes       Resource/Environmental Concerns    Resource/Environmental Concerns none    Environment Concerns none    Transportation Concerns none       Transportation Needs    In the past 12 months, has lack of transportation kept you from medical appointments or from getting medications? no    In the past 12 months, has lack of transportation kept you from meetings, work, or from getting things needed for daily living? No       Food Insecurity    Within the past 12 months, you worried that your food would run out before you got the money to buy more. Never true    Within the past 12 months, the food you bought just didn't last and you didn't have money to get more. Never true       Transition Planning    Patient/Family Anticipates Transition to home    Patient/Family Anticipated Services at Transition     Transportation Anticipated family or friend will provide       Discharge Needs Assessment    Readmission Within the Last 30 Days no previous admission in last 30 days    Equipment Currently Used at Home wheelchair                   Discharge Plan       Row Name 04/16/24 0923       Plan    Plan [facility]    Patient/Family in Agreement with Plan yes    Plan Comments Met with patient at the bedside to initiate  discharge planning. Patient was confused and unable to answer questions appropriately. Spoke with daughter, Lashae [phone], over the phone. Per Lashae, patient resides at [facility]. At baseline, she uses a wheelchair for assistance and requires max assist of two people for transfers. Patient has Medicare insurance with supplemental AARP, with prescription coverage. Patient's plan is to return to [facility] once medically ready for discharge. Daughter declines any recommendation for rehab services. CM attempted to call Jaciel at [facility] and  left. CM will continue to follow.    Final Discharge Disposition Code 01 - home or self-care                  Continued Care and Services - Admitted Since 4/15/2024    No active coordination exists for this encounter.          Demographic Summary       Row Name 04/16/24 0919       General Information    Admission Type inpatient    Arrived From long-term [facility]    Referral Source physician    Reason for Consult discharge planning    Preferred Language English    General Information Comments PCP Ro [facility]       Contact Information    Permission Granted to Share Info With family/designee    Contact Information Comments LASHAE CARTER (Daughter) [phone]                   Functional Status       Row Name 04/16/24 0920       Functional Status    Usual Activity Tolerance fair    Current Activity Tolerance poor       Functional Status, IADL    Medications completely dependent    Meal Preparation completely dependent    Housekeeping completely dependent    Laundry completely dependent    Shopping completely dependent       Mental Status    General Appearance WDL WDL       Mental Status Summary    Recent Changes in Mental Status/Cognitive Functioning unable to assess                   Psychosocial    No documentation.                  Abuse/Neglect    No documentation.                  Legal    No documentation.                  Substance Abuse    No documentation.                   Patient Forms    No documentation.                     Tanay Marinelli RN

## 2024-04-16 NOTE — PLAN OF CARE
Goal Outcome Evaluation:  Plan of Care Reviewed With: patient           Outcome Evaluation: Pt presents with confusion, weakness, decr balance with L lean limiting independence with self care/mobility.  Pt min A self feeding, setup to wash face, dep LBD,  dep x 2 STS with UE support achieving 50% upright position. OT will follow to advance pt toward PLOF.  Recommend SNF upon d/c if deemed medically      Anticipated Discharge Disposition (OT): skilled nursing facility

## 2024-04-16 NOTE — THERAPY EVALUATION
Patient Name: Nehal Parker  : 2/15/1926    MRN: [id number]                              Today's Date: 2024       Admit Date: 4/15/2024    Visit Dx:     ICD-10-CM ICD-9-CM   1. Acute diverticulitis  K57.92 562.11   2. Acute dehydration  E86.0 276.51   3. Elevated lipase  R74.8 790.5   4. Elevated LFTs  R79.89 790.6     Patient Active Problem List   Diagnosis    Generalized weakness    Acute CVA (cerebrovascular accident)    HLD (hyperlipidemia)    Essential hypertension    Hypothyroidism (acquired)    Mild depression    Cystitis    Diverticulitis    History of CVA (cerebrovascular accident)    Elevated liver enzymes     Past Medical History:   Diagnosis Date    Depression     Disease of thyroid gland     Hypertension      History reviewed. No pertinent surgical history.   General Information       Row Name 24 [facility]          Physical Therapy Time and Intention    Document Type evaluation  -AE     Mode of Treatment physical therapy  -AE       Row Name 24 1123          General Information    Patient Profile Reviewed yes  -AE     Prior Level of Function --  Unable to determine at this time.  -AE     Existing Precautions/Restrictions fall;other (see comments)  confusion  -AE     Barriers to Rehab previous functional deficit;cognitive status  -AE       Row Name 24 1123          Living Environment    People in Home facility resident  -AE       Row Name 24 1123          Cognition    Orientation Status (Cognition) oriented to;person;place;verbal cues/prompts needed for orientation;time  -AE       Row Name 24 1123          Safety Issues, Functional Mobility    Safety Issues Affecting Function (Mobility) awareness of need for assistance;insight into deficits/self-awareness;safety precaution awareness;safety precautions follow-through/compliance;sequencing abilities;judgment  -AE     Impairments Affecting Function (Mobility) balance;cognition;endurance/activity tolerance;postural/trunk  control;range of motion (ROM);strength  -AE     Cognitive Impairments, Mobility Safety/Performance awareness, need for assistance;insight into deficits/self-awareness;safety precaution awareness;safety precaution follow-through;sequencing abilities;problem-solving/reasoning  -AE               User Key  (r) = Recorded By, (t) = Taken By, (c) = Cosigned By      Initials Name Provider Type    AE Sathish Mckenzie, PT Physical Therapist                   Mobility       Row Name 04/16/24 1124          Bed Mobility    Bed Mobility rolling left;rolling right;sit-supine;supine-sit  -AE     Rolling Left Delaplane (Bed Mobility) maximum assist (25% patient effort);verbal cues  -AE     Rolling Right Delaplane (Bed Mobility) maximum assist (25% patient effort);verbal cues  -AE     Supine-Sit Delaplane (Bed Mobility) maximum assist (25% patient effort);2 person assist;verbal cues  -AE     Sit-Supine Delaplane (Bed Mobility) dependent (less than 25% patient effort);2 person assist;verbal cues  -AE     Assistive Device (Bed Mobility) bed rails;head of bed elevated;draw sheet  -AE     Comment, (Bed Mobility) VCs for hand placement and sequencing. Pt required increased cues to improve initiation and sequencing of BLE.  -AE       Row Name 04/16/24 1124          Transfers    Comment, (Transfers) VCs for hand placement and sequencing. Pt required increased cues to improve upright posture and pushing through BLE to complete STS. x2 STS attempted with ~50% upright.  -AE       Row Name 04/16/24 1124          Sit-Stand Transfer    Sit-Stand Delaplane (Transfers) dependent (less than 25% patient effort);2 person assist;verbal cues  -AE     Assistive Device (Sit-Stand Transfers) other (see comments)  BUE support  -AE               User Key  (r) = Recorded By, (t) = Taken By, (c) = Cosigned By      Initials Name Provider Type    AE Sathish Mckenzie, PT Physical Therapist                   Obj/Interventions       Row Name  04/16/24 1127          Range of Motion Comprehensive    General Range of Motion bilateral lower extremity ROM WFL  -AE     Comment, General Range of Motion PROM of BLE  -AE       Row Name 04/16/24 1127          Strength Comprehensive (MMT)    General Manual Muscle Testing (MMT) Assessment lower extremity strength deficits identified  -AE     Comment, General Manual Muscle Testing (MMT) Assessment BLE 4-/5; generalized weakness  -AE       Row Name 04/16/24 1127          Balance    Balance Assessment sitting static balance;sitting dynamic balance;sit to stand dynamic balance  -AE     Static Sitting Balance other (see comments)  mostly min-mod A but periods of CGA  -AE     Dynamic Sitting Balance other (see comments)  mostly min-mod A but periods of CGA  -AE     Position, Sitting Balance sitting edge of bed;supported  -AE     Sit to Stand Dynamic Balance dependent;2-person assist  -AE               User Key  (r) = Recorded By, (t) = Taken By, (c) = Cosigned By      Initials Name Provider Type    AE Sathish Mckenzie, PT Physical Therapist                   Goals/Plan       Row Name 04/16/24 1133          Bed Mobility Goal 1 (PT)    Activity/Assistive Device (Bed Mobility Goal 1, PT) sit to supine/supine to sit  -AE     [place] Level/Cues Needed (Bed Mobility Goal 1, PT) minimum assist (75% or more patient effort)  -AE     Time Frame (Bed Mobility Goal 1, PT) long term goal (LTG);10 days  -AE     Progress/Outcomes (Bed Mobility Goal 1, PT) new goal  -AE       Row Name 04/16/24 1133          Transfer Goal 1 (PT)    Activity/Assistive Device (Transfer Goal 1, PT) sit-to-stand/stand-to-sit;bed-to-chair/chair-to-bed  -AE     [place] Level/Cues Needed (Transfer Goal 1, PT) moderate assist (50-74% patient effort)  -AE     Time Frame (Transfer Goal 1, PT) long term goal (LTG);10 days  -AE     Progress/Outcome (Transfer Goal 1, PT) new goal  -AE       Row Name 04/16/24 1133          Gait Training Goal 1 (PT)     Activity/Assistive Device (Gait Training Goal 1, PT) assistive device use;gait (walking locomotion)  -AE     [place] Level (Gait Training Goal 1, PT) moderate assist (50-74% patient effort)  -AE     Distance (Gait Training Goal 1, PT) 5ft  -AE     Time Frame (Gait Training Goal 1, PT) long term goal (LTG);10 days  -AE     Progress/Outcome (Gait Training Goal 1, PT) new goal  -AE       Row Name 04/16/24 1133          Therapy Assessment/Plan (PT)    Planned Therapy Interventions (PT) balance training;bed mobility training;gait training;home exercise program;patient/family education;postural re-education;ROM (range of motion);strengthening;transfer training  -AE               User Key  (r) = Recorded By, (t) = Taken By, (c) = Cosigned By      Initials Name Provider Type    AE Sathish Mckenzie, PT Physical Therapist                   Clinical Impression       Row Name 04/16/24 1129          Pain    Additional Documentation Pain Scale: FACES Pre/Post-Treatment (Group)  -AE       Row Name 04/16/24 1129          Pain Scale: FACES Pre/Post-Treatment    Pain: FACES Scale, Pretreatment 0-->no hurt  -AE     Posttreatment Pain Rating 0-->no hurt  -AE       Row Name 04/16/24 1129          Plan of Care Review    Plan of Care Reviewed With patient  -AE     Progress no change  -AE     Outcome Evaluation Pt presents with generalized weakness, decreased functional endurance, and balance deficits. Pt attempted x2 STS with dep A x2 and BUE support. Recommend continued skilled IP PT interventions. Recommend D/C to SNF at this time, will monitor progress.  -AE       Row Name 04/16/24 1127          Therapy Assessment/Plan (PT)    Rehab Potential (PT) fair, will monitor progress closely  -AE     Criteria for Skilled Interventions Met (PT) yes  -AE     Therapy Frequency (PT) daily  -AE       Row Name 04/16/24 1129          Vital Signs    Pre Systolic BP Rehab 116  -AE     Pre Treatment Diastolic BP 39  -AE     O2 Delivery Pre Treatment  room air  -AE     O2 Delivery Intra Treatment room air  -AE     O2 Delivery Post Treatment room air  -AE     Pre Patient Position Supine  -AE     Intra Patient Position Standing  -AE     Post Patient Position Supine  -AE       Row Name 04/16/24 1129          Positioning and Restraints    Pre-Treatment Position in bed  -AE     Post Treatment Position bed  -AE     In Bed notified nsg;fowlers;call light within reach;encouraged to call for assist;exit alarm on;legs elevated;heels elevated;waffle boots/both  -AE               User Key  (r) = Recorded By, (t) = Taken By, (c) = Cosigned By      Initials Name Provider Type    AE Sathish Mckenzie, PT Physical Therapist                   Outcome Measures       Row Name 04/16/24 1134          How much help from another person do you currently need...    Turning from your back to your side while in flat bed without using bedrails? 2  -AE     Moving from lying on back to sitting on the side of a flat bed without bedrails? 1  -AE     Moving to and from a bed to a chair (including a wheelchair)? 1  -AE     Standing up from a chair using your arms (e.g., wheelchair, bedside chair)? 1  -AE     Climbing 3-5 steps with a railing? 1  -AE     To walk in hospital room? 1  -AE     AM-PAC 6 Clicks Score (PT) 7  -AE     Highest Level of Mobility Goal 2 --> Bed activities/dependent transfer  -AE       Row Name 04/16/24 1134 04/16/24 1100       Functional Assessment    Outcome Measure Options AM-PAC 6 Clicks Basic Mobility (PT)  -AE AM-PAC 6 Clicks Daily Activity (OT)  -AC              User Key  (r) = Recorded By, (t) = Taken By, (c) = Cosigned By      Initials Name Provider Type    July Iglesias, OT Occupational Therapist    AE Sathish Mckenzie, PT Physical Therapist                                 Physical Therapy Education       Title: PT OT SLP Therapies (In Progress)       Topic: Physical Therapy (In Progress)       Point: Mobility training (In Progress)       Learning Progress  Summary             Patient Acceptance, E, NR by AE at 4/16/2024 0930                         Point: Home exercise program (Not Started)       Learner Progress:  Not documented in this visit.              Point: Body mechanics (In Progress)       Learning Progress Summary             Patient Acceptance, E, NR by AE at 4/16/2024 0930                         Point: Precautions (In Progress)       Learning Progress Summary             Patient Acceptance, E, NR by AE at 4/16/2024 0930                                         User Key       Initials Effective Dates Name Provider Type Discipline    AE 09/21/21 -  Sathish Mckenzie, PT Physical Therapist PT                  PT Recommendation and Plan  Planned Therapy Interventions (PT): balance training, bed mobility training, gait training, home exercise program, patient/family education, postural re-education, ROM (range of motion), strengthening, transfer training  Plan of Care Reviewed With: patient  Progress: no change  Outcome Evaluation: Pt presents with generalized weakness, decreased functional endurance, and balance deficits. Pt attempted x2 STS with dep A x2 and BUE support. Recommend continued skilled IP PT interventions. Recommend D/C to SNF at this time, will monitor progress.     Time Calculation:   PT Evaluation Complexity  History, PT Evaluation Complexity: 1-2 personal factors and/or comorbidities  Examination of Body Systems (PT Eval Complexity): total of 3 or more elements  Clinical Presentation (PT Evaluation Complexity): evolving  Clinical Decision Making (PT Evaluation Complexity): moderate complexity  Overall Complexity (PT Evaluation Complexity): moderate complexity     PT Charges       Row Name 04/16/24 1136             Time Calculation    Start Time 0930  -AE      PT Received On 04/16/24  -AE      PT Goal Re-Cert Due Date 04/26/24  -AE         Untimed Charges    PT Eval/Re-eval Minutes 47  -AE         Total Minutes    Untimed Charges Total Minutes  47  -AE       Total Minutes 47  -AE                User Key  (r) = Recorded By, (t) = Taken By, (c) = Cosigned By      Initials Name Provider Type    AE Sathish Mckenzie PT Physical Therapist                  Therapy Charges for Today       Code Description Service Date Service Provider Modifiers Qty    [id number] HC PT EVAL MOD COMPLEXITY 4 4/16/2024 Sathish Mckenzie PT GP 1            PT G-Codes  Outcome Measure Options: AM-PAC 6 Clicks Basic Mobility (PT)  AM-PAC 6 Clicks Score (PT): 7  AM-PAC 6 Clicks Score (OT): 12  PT Discharge Summary  Anticipated Discharge Disposition (PT): skilled nursing facility    Sathish Mckenzie PT  4/16/2024

## 2024-04-16 NOTE — PLAN OF CARE
Goal Outcome Evaluation:  Plan of Care Reviewed With: patient        Progress: no change  Outcome Evaluation: PT with no new complaints today, VSS, on room air. Non-tele. Poor PO intake. IV Rocephin given. K+ replaced.

## 2024-04-16 NOTE — PROGRESS NOTES
[facility] Medicine Services  PROGRESS NOTE    Patient Name: Nehal Parker  : 2/15/1926  MRN: [id number]    Date of Admission: 4/15/2024  Primary Care Physician: Brenton Clay MD    Subjective   Subjective     CC:  Confusion, partially treated uti    HPI:  Denies pain  Poor appetite but no nausea  No n/v/d currently  Overall feeling better today      Objective   Objective     Vital Signs:   Temp:  [97.5 °F (36.4 °C)-97.7 °F (36.5 °C)] 97.5 °F (36.4 °C)  Heart Rate:  [81-96] (P) 82  Resp:  [14-20] 19  BP: (116-135)/(39-94) 122/70     Physical Exam:  Constitutional:Alert, elderly and frail, oriented to year, month, person. Answers simple questions appropriately. Elderly and frail but nontoxic appearing  Psych:Normal/appropriate affect  HEENT:NCAT, oropharynx clear  Neck: neck supple, full range of motion  Neuro: Face symmetric, speech clear, equal , moves all extremities  Cardiac: rrr  Resp: ctab  GI: abd soft, nontender to palpation  Skin: No extremity rash  Musculoskeletal/extremities: no cyanosis of extremities; no significant ankle edema          Results Reviewed:  LAB RESULTS:      Lab 24  0756 04/15/24  1610 04/15/24  1132   WBC 11.78*  --  11.71*   HEMOGLOBIN 13.0  --  12.2   HEMATOCRIT 43.6  --  38.2   PLATELETS 117*  --  142   NEUTROS ABS  --   --  9.84*   IMMATURE GRANS (ABS)  --   --  0.06*   LYMPHS ABS  --   --  0.62*   MONOS ABS  --   --  0.57   EOS ABS  --   --  0.58*   MCV 95.8  --  89.7   LACTATE  --  1.4 2.2*         Lab 24  0756 04/15/24  1132   SODIUM 139 140   POTASSIUM 3.5 4.1   CHLORIDE 108* 105   CO2 20.0* 27.0   ANION GAP 11.0 8.0   BUN 17 27*   CREATININE 0.33* 0.55*   EGFR 93.8 83.0   GLUCOSE 79 163*   CALCIUM 8.6 10.0*   MAGNESIUM 1.6* 2.0   TSH 2.540  --          Lab 24  0756 04/15/24  1132   TOTAL PROTEIN 5.9* 6.7   ALBUMIN 3.0* 3.3*   GLOBULIN 2.9 3.4   ALT (SGPT) 80* 102*   AST (SGOT) 101* 113*   BILIRUBIN 0.3 0.4   ALK PHOS  353* 393*   LIPASE  --  119*         Lab 04/15/24  1132   HSTROP T 13                 Brief Urine Lab Results  (Last result in the past 365 days)        Color   Clarity   Blood   Leuk Est   Nitrite   Protein   CREAT   Urine HCG        04/15/24 1155 Dark Yellow   Cloudy   Negative   Small (1+)   Negative   30 mg/dL (1+)                   Microbiology Results Abnormal       Procedure Component Value - Date/Time    Urine Culture - Urine, Urine, Catheter [[id number]]  (Normal) Collected: 04/15/24 1155    Lab Status: Preliminary result Specimen: Urine, Catheter Updated: 04/16/24 0909     Urine Culture No growth    MRSA Screen, PCR (Inpatient) - Swab, Nares [[id number]]  (Normal) Collected: 04/16/24 0641    Lab Status: Final result Specimen: Swab from Nares Updated: 04/16/24 0809     MRSA PCR Negative    Narrative:      The negative predictive value of this diagnostic test is high and should only be used to consider de-escalating anti-MRSA therapy. A positive result may indicate colonization with MRSA and must be correlated clinically.  MRSA Negative    COVID PRE-OP / PRE-PROCEDURE SCREENING ORDER (NO ISOLATION) - Swab, Nasopharynx [[id number]]  (Normal) Collected: 04/15/24 1133    Lab Status: Final result Specimen: Swab from Nasopharynx Updated: 04/15/24 1236    Narrative:      The following orders were created for panel order COVID PRE-OP / PRE-PROCEDURE SCREENING ORDER (NO ISOLATION) - Swab, Nasopharynx.  Procedure                               Abnormality         Status                     ---------                               -----------         ------                     Respiratory Panel PCR w/...[[id number]]  Normal              Final result                 Please view results for these tests on the individual orders.    Respiratory Panel PCR w/COVID-19(SARS-CoV-2) MARY/SHIRAZ/PETER/PAD/COR/LULÚ In-House, NP Swab in UTM/VTM, 2 HR TAT - Swab, Nasopharynx [[id number]]  (Normal) Collected: 04/15/24 1133    Lab Status: Final  result Specimen: Swab from Nasopharynx Updated: 04/15/24 1236     ADENOVIRUS, PCR Not Detected     Coronavirus 229E Not Detected     Coronavirus HKU1 Not Detected     Coronavirus NL63 Not Detected     Coronavirus OC43 Not Detected     COVID19 Not Detected     Human Metapneumovirus Not Detected     Human Rhinovirus/Enterovirus Not Detected     Influenza A PCR Not Detected     Influenza B PCR Not Detected     Parainfluenza Virus 1 Not Detected     Parainfluenza Virus 2 Not Detected     Parainfluenza Virus 3 Not Detected     Parainfluenza Virus 4 Not Detected     RSV, PCR Not Detected     Bordetella pertussis pcr Not Detected     Bordetella parapertussis PCR Not Detected     Chlamydophila pneumoniae PCR Not Detected     Mycoplasma pneumo by PCR Not Detected    Narrative:      In the setting of a positive respiratory panel with a viral infection PLUS a negative procalcitonin without other underlying concern for bacterial infection, consider observing off antibiotics or discontinuation of antibiotics and continue supportive care. If the respiratory panel is positive for atypical bacterial infection (Bordetella pertussis, Chlamydophila pneumoniae, or Mycoplasma pneumoniae), consider antibiotic de-escalation to target atypical bacterial infection.            CT Abdomen Pelvis Without Contrast    Result Date: 4/15/2024  CT ABDOMEN PELVIS WO CONTRAST Date of Exam: 4/15/2024 1:00 PM EDT Indication: confusion/abdominal pain. Comparison: CT of the abdomen and pelvis dated 9/13/2022 Technique: Axial CT images were obtained of the abdomen and pelvis without the administration of contrast. Reconstructed coronal and sagittal images were also obtained. Automated exposure control and iterative construction methods were used. Findings: Liver: The liver is unremarkable in morphology. Probable cysts within the left hepatic lobe. Further evaluation of the liver is limited without IV contrast. No biliary dilation is seen. Gallbladder:  Unremarkable. Pancreas: The pancreas is atrophic. Spleen: Unremarkable. Adrenal glands: Unremarkable. Genitourinary tract: The kidneys appear unremarkable. No hydronephrosis is seen. The visualized portions of the ureters are unremarkable. No urinary tract calculi are seen. Distal ureters and urinary bladder are partially obscured due to beam hardening artifact arising from bilateral hip arthroplasties. There appears to be a left adnexal cyst measuring approximately 2.7 cm. Gastrointestinal tract: Colonic diverticulosis is present. Questionable wall thickening within the proximal sigmoid colon versus underdistention (series 2, image 103. Mild sigmoid diverticulitis cannot be entirely excluded. Please correlate with patient's symptomatology. No free air or abscess formation is identified. Large hiatal hernia. No evidence of bowel obstruction. Appendix: The appendix is not identified. Other findings: No free air or free fluid. No pathologically enlarged lymph nodes are seen. Vascular calcifications are present. Bones and soft tissues: No acute osseous lesion is identified. Bones are demineralized. There are degenerative changes within the spine. 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 Superficial soft tissues demonstrate no acute abnormality. Lung bases: Large hiatal hernia is partially imaged. Mitral annulus calcification is noted. Scattered bibasilar fibrotic changes.     Impression: Impression: 1.Examination is limited due to lack of IV contrast administration. 2.Colonic diverticulosis. Questionable wall thickening versus underdistention of the proximal sigmoid colon. Mild diverticulitis may be considered in the appropriate clinical setting. Please correlate with patient's symptomatology. No free air or abscess  formation identified. 3.Large hiatal hernia. 4.Left adnexal cyst measuring approximately 2.7 cm. 5.Nonvisualized appendix. 6.Additional findings as detailed above. Electronically Signed: Mitesh  MD Mala  4/15/2024 1:13 PM EDT  Workstation ID: [id number]    XR Chest 1 View    Result Date: 4/15/2024  XR CHEST 1 VW Date of Exam: 4/15/2024 11:59 AM EDT Indication: dyspnea/fatigue Comparison: Chest radiograph 9/11/2022. Findings: Enlarged cardiac silhouette, unchanged. Thoracic aortic calcifications. Diffusely coarsened interstitium, unchanged from prior exam and likely related to chronic/senescent changes. No focal consolidation. No pleural effusion or pneumothorax. Osseous structures are unchanged. Hiatal hernia.     Impression: Impression: Chronic changes of the lungs without evidence of acute cardiopulmonary disease. Electronically Signed: Mickey Pressley MD  4/15/2024 12:09 PM EDT  Workstation ID: [id number]     Results for orders placed during the hospital encounter of 09/11/22    Adult Transthoracic Echo Complete W/ Cont if Necessary Per Protocol (With Agitated Saline)    Interpretation Summary  · Left ventricular wall thickness is consistent with mild concentric hypertrophy.  · Left ventricular ejection fraction appears to be 56 - 60%.  · Left atrial volume is mildly increased.  · There is calcification of the aortic valve.  · Mild dilation of the aortic root is present. Mild dilation of the sinuses of Valsalva is present. Mild dilation of the ascending aorta is present.  · MAC and calcification of subvalvular structures but no significant MS  · Mild MR      Current medications:  Scheduled Meds:aspirin, 81 mg, Oral, Daily  [Held by provider] atorvastatin, 40 mg, Oral, Nightly  cefTRIAXone, 1,000 mg, Intravenous, Q24H  heparin (porcine), 5,000 Units, Subcutaneous, Q8H  levothyroxine, 112 mcg, Oral, Q AM  [Held by provider] losartan, 50 mg, Oral, Q24H  potassium chloride, 10 mEq, Intravenous, Q1H  sertraline, 25 mg, Oral, Daily  sodium chloride, 10 mL, Intravenous, Q12H  [Held by provider] terazosin, 1 mg, Oral, Nightly      Continuous Infusions:sodium chloride, 75 mL/hr, Last Rate: 100 mL/hr (04/16/24  0929)      PRN Meds:.  acetaminophen **OR** acetaminophen **OR** acetaminophen    senna-docusate sodium **AND** polyethylene glycol **AND** bisacodyl **AND** bisacodyl    Calcium Replacement - Follow Nurse / BPA Driven Protocol    Magnesium Standard Dose Replacement - Follow Nurse / BPA Driven Protocol    ondansetron ODT **OR** ondansetron    Phosphorus Replacement - Follow Nurse / BPA Driven Protocol    Potassium Replacement - Follow Nurse / BPA Driven Protocol    [COMPLETED] Insert Peripheral IV **AND** sodium chloride    sodium chloride    sodium chloride    Assessment & Plan   Assessment & Plan     Active Hospital Problems    Diagnosis  POA    **Diverticulitis [K57.92]  Yes    History of CVA (cerebrovascular accident) [Z86.73]  Not Applicable    Elevated liver enzymes [R74.8]  Yes    Essential hypertension [I10]  Yes    Hypothyroidism (acquired) [E03.9]  Yes    Generalized weakness [R53.1]  Yes      Resolved Hospital Problems   No resolved problems to display.        Brief Hospital Course to date:  Nehal Parker is a 89 y.o. female resident of [facility] w/ previous cva, bedbound status (uses wheelchair at baseline), hypothyroidism w/ recent diagnosis of uti at facility, treated w/ bactrim for couple of days there. Patient presented w/ worsening somnolence/confusion and leukocytosis.     Generalized weakness  Probable partially treated UTI  Questionable mild diverticulitis (on ct scan)  -ct mentioned possible mild diverticulitis but clinically not c/w diverticulitis  -had been diagnosed w/ uti at nursing facility and treated w/ bactrim for couple of days prior to admission  -presented w/ somnolence/confusion  -u/a here not improvessive, but partially treated uti. Initially started on azactam. I will change to rocephin today (change to ceftin upon discharge)  -continue iv fluids  -overall improved mentation today  -d/w daughter bedside 4/16/24, agreeable to iv fluids and antibiotics and if  continues to improve then back to [facility] facility tomorrow 4/17 or 4/18 (daughter confirmed dnr/dni/no aggressive treatments)    Elevated lft's  -improved; holding statin    Hx CVA  HTN  -continue asa 81 (holding statin as ablve)    Hypothyroidism  -continue levothyroxine; tsh wnl    Am labs: cbc,bmp,mag (follow cultures)    Expected Discharge Location and Transportation[place] [facility] [place]  Expected Discharge 4/17/24 @ 13:45 if continue to improve clinically  Expected discharge date/ time has not been documented.     DVT prophylaxis:  Medical DVT prophylaxis orders are present.         AM-PAC 6 Clicks Score (PT): 7 (04/16/24 1134)    CODE STATUS:   Code Status and Medical Interventions:   Ordered at: 04/15/24 1400     Medical Intervention Limits:    NO intubation (DNI)     Code Status (Patient has no pulse and is not breathing):    No CPR (Do Not Attempt to Resuscitate)     Medical Interventions (Patient has pulse or is breathing):    Limited Support       Carson Blankenship MD  04/16/24

## 2024-04-16 NOTE — THERAPY EVALUATION
Acute Care - Speech Language Pathology   Swallow Initial Evaluation [facility]  Clinical Swallow Evaluation     Patient Name: Nehal Parker  : 2/15/1926  MRN: [id number]  Today's Date: 2024               Admit Date: 4/15/2024    Visit Dx:     ICD-10-CM ICD-9-CM   1. Acute diverticulitis  K57.92 562.11   2. Acute dehydration  E86.0 276.51   3. Elevated lipase  R74.8 790.5   4. Elevated LFTs  R79.89 790.6   5. Dysphagia, unspecified type  R13.10 787.20     Patient Active Problem List   Diagnosis    Generalized weakness    Acute CVA (cerebrovascular accident)    HLD (hyperlipidemia)    Essential hypertension    Hypothyroidism (acquired)    Mild depression    Cystitis    Diverticulitis    History of CVA (cerebrovascular accident)    Elevated liver enzymes     Past Medical History:   Diagnosis Date    Depression     Disease of thyroid gland     Hypertension      History reviewed. No pertinent surgical history.    SLP Recommendation and Plan  SLP Swallowing Diagnosis: R/O pharyngeal dysphagia (24)  SLP Diet Recommendation: regular textures, thin liquids (24)  Recommended Precautions and Strategies: upright posture during/after eating, general aspiration precautions, assist with feeding (24)  SLP Rec. for Method of Medication Administration: meds whole, with thin liquids, with puree, as tolerated (24)     Monitor for Signs of Aspiration: yes, notify SLP if any concerns (24)     Swallow Criteria for Skilled Therapeutic Interventions Met: demonstrates skilled criteria (24)  Anticipated Discharge Disposition (SLP): home with home health (if needed @ time of d/c) (24)  Rehab Potential/Prognosis, Swallowing: good, to achieve stated therapy goals (24)  Therapy Frequency (Swallow): PRN (24)  Predicted Duration Therapy Intervention (Days): until discharge (24)  Oral Care Recommendations: Oral Care BID/PRN,  Toothbrush (04/16/24 0900)                                        Plan of Care Reviewed With: patient      SWALLOW EVALUATION (Last 72 Hours)       SLP Adult Swallow Evaluation       Row Name 04/16/24 0900                   Rehab Evaluation    Document Type evaluation  -AC        Subjective Information no complaints  -AC        Patient Observations alert;cooperative  -AC        Patient/Family/Caregiver Comments/Observations No family present.  -AC        Patient Effort good  -AC           General Information    Patient Profile Reviewed yes  -AC        Pertinent History Of Current Problem Adm w/ diverticulitis, decreased responsiveness, weakness. Recent UTI/abx. CT abdomen: large HH. Hx CVA. Consulted 2' concern that pt coughed w/ meds overnight.  -AC        Current Method of Nutrition regular textures;thin liquids  -AC        Precautions/Limitations, Vision WFL;for purposes of eval  -AC        Precautions/Limitations, Hearing WFL;for purposes of eval  -AC        Prior Level of Function-Communication unknown  -AC        Prior Level of Function-Swallowing unknown  -AC        Plans/Goals Discussed with patient;agreed upon  -AC        Barriers to Rehab none identified  -AC        Patient's Goals for Discharge patient did not state  -AC           Pain    Additional Documentation Pain Scale: FACES Pre/Post-Treatment (Group)  -AC           Pain Scale: FACES Pre/Post-Treatment    Pain: FACES Scale, Pretreatment 0-->no hurt  -AC        Posttreatment Pain Rating 0-->no hurt  -AC           Oral Motor Structure and Function    Dentition Assessment natural, present and adequate  -AC        Secretion Management WNL/WFL  -AC        Mucosal Quality dry  -AC        Volitional Swallow WFL  -AC        Volitional Cough WFL  -AC           Oral Musculature and Cranial Nerve Assessment    Oral Motor General Assessment WFL  -AC           General Eating/Swallowing Observations    Respiratory Support Currently in Use room air  -AC         Eating/Swallowing Skills fed by SLP;self-fed;needed assist  -        Positioning During Eating upright in bed  -        Utensils Used spoon;cup;straw  -        Consistencies Trialed thin liquids;pureed;regular textures  -           Clinical Swallow Eval    Oral Prep Phase WFL  -AC        Oral Transit WFL  -AC        Oral Residue WFL  -AC        Pharyngeal Phase no overt signs/symptoms of pharyngeal impairment  even when pt pushed w/ consecutive trials  -        Esophageal Phase suspected esophageal impairment  -        Clinical Swallow Evaluation Summary General dysphagia risk 2' lethargy/cognitive status.  -           Esophageal Phase Concerns    Esophageal Phase Concerns belching  -        Belching --  occasionally t/o eval  -           Swallowing Quality of Life Assessment    Education and counseling provided Risks of aspiration;Aspiration precautions;Feeding assistance and techniques  PCT arrived to feed pt breakfast  -           SLP Evaluation Clinical Impression    SLP Swallowing Diagnosis R/O pharyngeal dysphagia  -        Functional Impact risk of aspiration/pneumonia  -        Rehab Potential/Prognosis, Swallowing good, to achieve stated therapy goals  -        Swallow Criteria for Skilled Therapeutic Interventions Met demonstrates skilled criteria  -           Recommendations    Therapy Frequency (Swallow) PRN  -        Predicted Duration Therapy Intervention (Days) until discharge  -        SLP Diet Recommendation regular textures;thin liquids  -        Recommended Precautions and Strategies upright posture during/after eating;general aspiration precautions;assist with feeding  -        Oral Care Recommendations Oral Care BID/PRN;Toothbrush  -        SLP Rec. for Method of Medication Administration meds whole;with thin liquids;with puree;as tolerated  -        Monitor for Signs of Aspiration yes;notify SLP if any concerns  -        Anticipated Discharge  Disposition (SLP) home with home health  if needed @ time of d/c  -AC                  User Key  (r) = Recorded By, (t) = Taken By, (c) = Cosigned By      Initials Name Effective Dates    Thais Haines MS CCC-SLP 02/03/23 -                     EDUCATION  The patient has been educated in the following areas:   Dysphagia (Swallowing Impairment).        SLP GOALS       Row Name 04/16/24 0900             (LTG) Patient will demonstrate functional swallow for    Diet Texture (Demonstrate functional swallow) regular textures  -AC      Liquid viscosity (Demonstrate functional swallow) thin liquids  -AC      Lewis (Demonstrate functional swallow) with 1:1 assist/ supervision  -AC      Time Frame (Demonstrate functional swallow) 1 week  -AC         (STG) Patient will tolerate trials of    Consistencies Trialed (Tolerate trials) regular textures;thin liquids  -AC      Desired Outcome (Tolerate trials) without signs/symptoms of aspiration;with adequate oral prep/transit/clearance  -AC      Lewis (Tolerate trials) with 1:1 assist/ supervision  -AC      Time Frame (Tolerate trials) 1 week  -AC                User Key  (r) = Recorded By, (t) = Taken By, (c) = Cosigned By      Initials Name Provider Type    Thais Haines MS CCC-SLP Speech and Language Pathologist                       Time Calculation:    Time Calculation- SLP       Row Name 04/16/24 1306             Time Calculation- SLP    SLP Start Time 0900  -      SLP Received On 04/16/24  -AC         Untimed Charges    45640-SL Eval Oral Pharyng Swallow Minutes 52  -AC         Total Minutes    Untimed Charges Total Minutes 52  -AC       Total Minutes 52  -AC                User Key  (r) = Recorded By, (t) = Taken By, (c) = Cosigned By      Initials Name Provider Type    Thais Haines MS CCC-SLP Speech and Language Pathologist                    Therapy Charges for Today       Code Description Service Date Service Provider Modifiers Qty     [id number] [facility] ORAL PHARYNG SWALLOW 3 4/16/2024 Thais Tovar, MS CCC-SLP GN 1                 Thais Tovar MS CCC-SLP  4/16/2024   - - -

## 2024-04-16 NOTE — PLAN OF CARE
Goal Outcome Evaluation:  Plan of Care Reviewed With: patient        Progress: no change  Outcome Evaluation: Pt presents with generalized weakness, decreased functional endurance, and balance deficits. Pt attempted x2 STS with dep A x2 and BUE support. Recommend continued skilled IP PT interventions. Recommend D/C to SNF at this time, will monitor progress.      Anticipated Discharge Disposition (PT): skilled nursing facility

## 2024-04-16 NOTE — THERAPY EVALUATION
Patient Name: Nehal Parker  : 2/15/1926    MRN: [id number]                              Today's Date: 2024       Admit Date: 4/15/2024    Visit Dx:     ICD-10-CM ICD-9-CM   1. Acute diverticulitis  K57.92 562.11   2. Acute dehydration  E86.0 276.51   3. Elevated lipase  R74.8 790.5   4. Elevated LFTs  R79.89 790.6     Patient Active Problem List   Diagnosis    Generalized weakness    Acute CVA (cerebrovascular accident)    HLD (hyperlipidemia)    Essential hypertension    Hypothyroidism (acquired)    Mild depression    Cystitis    Diverticulitis    History of CVA (cerebrovascular accident)    Elevated liver enzymes     Past Medical History:   Diagnosis Date    Depression     Disease of thyroid gland     Hypertension      History reviewed. No pertinent surgical history.   General Information       Row Name 24 0933          OT Time and Intention    Document Type evaluation  -     Mode of Treatment occupational therapy  -       Row Name 24 0933          General Information    Patient Profile Reviewed yes  -AC     Prior Level of Function --  pt poor historian  -     Existing Precautions/Restrictions fall  confused  -     Barriers to Rehab previous functional deficit;cognitive status  -       Row Name 24 0933          Living Environment    People in Home facility resident  -       Row Name 24 0933          Cognition    Orientation Status (Cognition) oriented to;person;place;verbal cues/prompts needed for orientation;time  initially stated it was Oct 2007, but gave accurate month/yr when given choices  -       Row Name 24 0933          Safety Issues, Functional Mobility    Safety Issues Affecting Function (Mobility) awareness of need for assistance;insight into deficits/self-awareness;judgment;safety precaution awareness;safety precautions follow-through/compliance;sequencing abilities;problem-solving  -     Impairments Affecting Function (Mobility)  balance;cognition;endurance/activity tolerance;postural/trunk control;range of motion (ROM);strength  -     Cognitive Impairments, Mobility Safety/Performance awareness, need for assistance;insight into deficits/self-awareness;judgment;problem-solving/reasoning;safety precaution awareness;safety precaution follow-through;sequencing abilities  -               User Key  (r) = Recorded By, (t) = Taken By, (c) = Cosigned By      Initials Name Provider Type     July Bauman, OT Occupational Therapist                     Mobility/ADL's       Row Name 04/16/24 1046          Bed Mobility    Bed Mobility rolling left;rolling right;scooting/bridging;supine-sit;sit-supine  -     Rolling Left [place] (Bed Mobility) verbal cues;maximum assist (25% patient effort)  -     Rolling Right [place] (Bed Mobility) verbal cues;maximum assist (25% patient effort)  -     Scooting/Bridging [place] (Bed Mobility) verbal cues;dependent (less than 25% patient effort);2 person assist  -     Supine-Sit [place] (Bed Mobility) verbal cues;maximum assist (25% patient effort);2 person assist  -AC     Sit-Supine [place] (Bed Mobility) verbal cues;dependent (less than 25% patient effort)  -     Bed Mobility, Safety Issues cognitive deficits limit understanding;decreased use of arms for pushing/pulling;decreased use of legs for bridging/pushing;impaired trunk control for bed mobility  -     Assistive Device (Bed Mobility) bed rails;draw sheet;head of bed elevated  -       Row Name 04/16/24 1046          Transfers    Transfers sit-stand transfer  -       Row Name 04/16/24 1046          Sit-Stand Transfer    Sit-Stand [place] (Transfers) verbal cues;nonverbal cues (demo/gesture);dependent (less than 25% patient effort);2 person assist  -     Assistive Device (Sit-Stand Transfers) other (see comments)  BUE support  -     Comment, (Sit-Stand Transfer) 2 STS achieving 50% upright  -       Row Name  04/16/24 1046          Activities of Daily Living    BADL Assessment/Intervention lower body dressing;grooming;feeding  -AC       Row Name 04/16/24 1046          Lower Body Dressing Assessment/Training    [place] Level (Lower Body Dressing) don;socks;dependent (less than 25% patient effort)  -AC     Position (Lower Body Dressing) supine  -AC       Row Name 04/16/24 1046          Grooming Assessment/Training    [place] Level (Grooming) set up;wash face, hands  -AC     Position (Grooming) sitting up in bed  -AC       Row Name 04/16/24 1046          Self-Feeding Assessment/Training    [place] Level (Feeding) feeding skills;minimum assist (75% patient effort)  -AC     Position (Self-Feeding) sitting up in bed  -               User Key  (r) = Recorded By, (t) = Taken By, (c) = Cosigned By      Initials Name Provider Type    July Iglesias, OT Occupational Therapist                   Obj/Interventions       Row Name 04/16/24 1049          Sensory Assessment (Somatosensory)    Sensory Subjective Reports numbness;tingling  BUE  -[facility] Name 04/16/24 1049          Vision Assessment/Intervention    Visual Impairment/Limitations unable/difficult to assess  L eye partially closed  -[facility] Name 04/16/24 1049          Range of Motion Comprehensive    Comment, General Range of Motion L shld limited  -[facility] Name 04/16/24 1049          Strength Comprehensive (MMT)    Comment, General Manual Muscle Testing (MMT) Assessment RUE grossly 4-/5,  L shld 2/5,  LUE distally 3+/5  -       Row Name 04/16/24 1049          Balance    Balance Assessment sitting static balance;standing static balance  -     Static Sitting Balance verbal cues;non-verbal cues (demo/gesture)  CGA to mod A with L lean  -AC     Static Standing Balance verbal cues;non-verbal cues (demo/gesture);dependent;2-person assist  -     Position/Device Used, Standing Balance supported;other (see comments)  BUE support  -AC                User Key  (r) = Recorded By, (t) = Taken By, (c) = Cosigned By      Initials Name Provider Type    July Iglesias, OT Occupational Therapist                   Goals/Plan       Row Name 04/16/24 1058          Bed Mobility Goal 1 (OT)    Activity/Assistive Device (Bed Mobility Goal 1, OT) sit to supine;supine to sit  -AC     [place] Level/Cues Needed (Bed Mobility Goal 1, OT) verbal cues required;nonverbal cues (demo/gesture) required;moderate assist (50-74% patient effort)  -AC     Time Frame (Bed Mobility Goal 1, OT) by discharge  -AC     Progress/Outcomes (Bed Mobility Goal 1, OT) new goal;goal ongoing  -AC       Row Name 04/16/24 1058          Transfer Goal 1 (OT)    Activity/Assistive Device (Transfer Goal 1, OT) bed-to-chair/chair-to-bed;other (see comments);walker, rolling  -AC     [place] Level/Cues Needed (Transfer Goal 1, OT) verbal cues required;maximum assist (25-49% patient effort)  -AC     Time Frame (Transfer Goal 1, OT) by discharge  -AC     Progress/Outcome (Transfer Goal 1, OT) new goal;goal ongoing  -AC       Row Name 04/16/24 1058          Grooming Goal 1 (OT)    Activity/Device (Grooming Goal 1, OT) hair care;oral care  -AC     [place] (Grooming Goal 1, OT) minimum assist (75% or more patient effort)  -AC     Time Frame (Grooming Goal 1, OT) by discharge  -AC     Strategies/Barriers (Grooming Goal 1, OT) seated EOB  -AC       Row Name 04/16/24 1058          Therapy Assessment/Plan (OT)    Planned Therapy Interventions (OT) activity tolerance training;BADL retraining;functional balance retraining;occupation/activity based interventions;patient/caregiver education/training;strengthening exercise;transfer/mobility retraining  -AC               User Key  (r) = Recorded By, (t) = Taken By, (c) = Cosigned By      Initials Name Provider Type    July Iglesias OT Occupational Therapist                   Clinical Impression       Row Name 04/16/24 1052          Pain Assessment     Additional Documentation Pain Scale: FACES Pre/Post-Treatment (Group)  -       Row Name 04/16/24 1052          Pain Scale: FACES Pre/Post-Treatment    Pain: FACES Scale, Pretreatment 0-->no hurt  -AC     Posttreatment Pain Rating 0-->no hurt  -AC       Row Name 04/16/24 1052          Plan of Care Review    Plan of Care Reviewed With patient  -AC     Outcome Evaluation Pt presents with confusion, weakness, decr balance with L lean limiting independence with self care/mobility.  Pt min A self feeding, setup to wash face, dep LBD,  dep x 2 STS with UE support achieving 50% upright position. OT will follow to advance pt toward PLOF.  Recommend SNF upon d/c if deemed medically  -       Row Name 04/16/24 1052          Therapy Assessment/Plan (OT)    Rehab Potential (OT) fair, will monitor progress closely  -     Criteria for Skilled Therapeutic Interventions Met (OT) yes;skilled treatment is necessary  -     Therapy Frequency (OT) daily  -       Row Name 04/16/24 1052          Therapy Plan Review/Discharge Plan (OT)    Anticipated Discharge Disposition (OT) skilled nursing facility  -       Row Name 04/16/24 1052          Vital Signs    Pre Systolic BP Rehab 116  -AC     Pre Treatment Diastolic BP 39  -AC     Pre Patient Position Supine  -AC     Post Patient Position Supine  -AC       Row Name 04/16/24 1052          Positioning and Restraints    Pre-Treatment Position in bed  -AC     Post Treatment Position bed  -AC     In Bed notified nsg;fowlers;call light within reach;encouraged to call for assist;exit alarm on;waffle boots/both  -AC               User Key  (r) = Recorded By, (t) = Taken By, (c) = Cosigned By      Initials Name Provider Type    AC July Bauman, OT Occupational Therapist                   Outcome Measures       Row Name 04/16/24 1100          How much help from another is currently needed...    Putting on and taking off regular lower body clothing? 1  -AC     Bathing (including washing,  rinsing, and drying) 2  -AC     Toileting (which includes using toilet bed pan or urinal) 1  -AC     Putting on and taking off regular upper body clothing 2  -AC     Taking care of personal grooming (such as brushing teeth) 3  -AC     Eating meals 3  -AC     AM-PAC 6 Clicks Score (OT) 12  -AC       Row Name 04/16/24 1100          Functional Assessment    Outcome Measure Options AM-PAC 6 Clicks Daily Activity (OT)  -               User Key  (r) = Recorded By, (t) = Taken By, (c) = Cosigned By      Initials Name Provider Type    AC July Bauman OT Occupational Therapist                    Occupational Therapy Education       Title: PT OT SLP Therapies (In Progress)       Topic: Occupational Therapy (In Progress)       Point: ADL training (In Progress)       Description:   Instruct learner(s) on proper safety adaptation and remediation techniques during self care or transfers.   Instruct in proper use of assistive devices.                  Learning Progress Summary             Patient Acceptance, E, NR by  at 4/16/2024 1100                         Point: Home exercise program (Not Started)       Description:   Instruct learner(s) on appropriate technique for monitoring, assisting and/or progressing therapeutic exercises/activities.                  Learner Progress:  Not documented in this visit.              Point: Precautions (Not Started)       Description:   Instruct learner(s) on prescribed precautions during self-care and functional transfers.                  Learner Progress:  Not documented in this visit.              Point: Body mechanics (Not Started)       Description:   Instruct learner(s) on proper positioning and spine alignment during self-care, functional mobility activities and/or exercises.                  Learner Progress:  Not documented in this visit.                              User Key       Initials Effective Dates Name Provider Type Discipline     02/03/23 -  July Bauman OT  Occupational Therapist OT                  OT Recommendation and Plan  Planned Therapy Interventions (OT): activity tolerance training, BADL retraining, functional balance retraining, occupation/activity based interventions, patient/caregiver education/training, strengthening exercise, transfer/mobility retraining  Therapy Frequency (OT): daily  Plan of Care Review  Plan of Care Reviewed With: patient  Outcome Evaluation: Pt presents with confusion, weakness, decr balance with L lean limiting independence with self care/mobility.  Pt min A self feeding, setup to wash face, dep LBD,  dep x 2 STS with UE support achieving 50% upright position. OT will follow to advance pt toward PLOF.  Recommend SNF upon d/c if deemed medically     Time Calculation:   Evaluation Complexity (OT)  Review Occupational Profile/Medical/Therapy History Complexity: expanded/moderate complexity  Assessment, Occupational Performance/Identification of Deficit Complexity: 3-5 performance deficits  Clinical Decision Making Complexity (OT): detailed assessment/moderate complexity  Overall Complexity of Evaluation (OT): moderate complexity     Time Calculation- OT       Row Name 04/16/24 0933             Time Calculation- OT    OT Start Time 0933  -AC      OT Received On 04/16/24  -AC      OT Goal Re-Cert Due Date 04/26/24  -AC         Timed Charges    49505 - OT Therapeutic Activity Minutes 10  -AC         Untimed Charges    OT Eval/Re-eval Minutes 50  -AC         Total Minutes    Timed Charges Total Minutes 10  -AC      Untimed Charges Total Minutes 50  -AC       Total Minutes 60  -AC                User Key  (r) = Recorded By, (t) = Taken By, (c) = Cosigned By      Initials Name Provider Type    AC July Bauman OT Occupational Therapist                  Therapy Charges for Today       Code Description Service Date Service Provider Modifiers Qty    [id number]  OT THERAPEUTIC ACT EA 15 MIN 4/16/2024 July Bauman OT GO 1    [id number]   OT EVAL MOD COMPLEXITY 4 4/16/2024 July Bauman, OT GO 1                 July Bauman, OT  4/16/2024

## 2024-04-16 NOTE — PLAN OF CARE
Goal Outcome Evaluation:  Plan of Care Reviewed With: patient                  Anticipated Discharge Disposition (SLP): home with home health (if needed @ time of d/c)          SLP Swallowing Diagnosis: R/O pharyngeal dysphagia (04/16/24 0900)

## 2024-04-17 LAB
ANION GAP SERPL CALCULATED.3IONS-SCNC: 11 MMOL/L (ref 5–15)
BUN SERPL-MCNC: 12 MG/DL (ref 8–23)
BUN/CREAT SERPL: 41.4 (ref 7–25)
CALCIUM SPEC-SCNC: 8.3 MG/DL (ref 8.2–9.6)
CHLORIDE SERPL-SCNC: 112 MMOL/L (ref 98–107)
CO2 SERPL-SCNC: 17 MMOL/L (ref 22–29)
CREAT SERPL-MCNC: 0.29 MG/DL (ref 0.57–1)
DEPRECATED RDW RBC AUTO: 47.7 FL (ref 37–54)
EGFRCR SERPLBLD CKD-EPI 2021: 96.8 ML/MIN/1.73
ERYTHROCYTE [DISTWIDTH] IN BLOOD BY AUTOMATED COUNT: 14.2 % (ref 12.3–15.4)
GLUCOSE SERPL-MCNC: 88 MG/DL (ref 65–99)
HCT VFR BLD AUTO: 36.5 % (ref 34–46.6)
HGB BLD-MCNC: 11.2 G/DL (ref 12–15.9)
MAGNESIUM SERPL-MCNC: 1.5 MG/DL (ref 1.7–2.3)
MCH RBC QN AUTO: 27.9 PG (ref 26.6–33)
MCHC RBC AUTO-ENTMCNC: 30.7 G/DL (ref 31.5–35.7)
MCV RBC AUTO: 90.8 FL (ref 79–97)
PLATELET # BLD AUTO: 161 10*3/MM3 (ref 140–450)
PMV BLD AUTO: 10 FL (ref 6–12)
POTASSIUM SERPL-SCNC: 3.6 MMOL/L (ref 3.5–5.2)
POTASSIUM SERPL-SCNC: 3.9 MMOL/L (ref 3.5–5.2)
RBC # BLD AUTO: 4.02 10*6/MM3 (ref 3.77–5.28)
SODIUM SERPL-SCNC: 140 MMOL/L (ref 136–145)
WBC NRBC COR # BLD AUTO: 10.3 10*3/MM3 (ref 3.4–10.8)

## 2024-04-17 PROCEDURE — 84132 ASSAY OF SERUM POTASSIUM: CPT | Performed by: INTERNAL MEDICINE

## 2024-04-17 PROCEDURE — 83735 ASSAY OF MAGNESIUM: CPT | Performed by: INTERNAL MEDICINE

## 2024-04-17 PROCEDURE — 25810000003 SODIUM CHLORIDE 0.9 % SOLUTION: Performed by: INTERNAL MEDICINE

## 2024-04-17 PROCEDURE — 25010000002 HEPARIN (PORCINE) PER 1000 UNITS: Performed by: INTERNAL MEDICINE

## 2024-04-17 PROCEDURE — 85027 COMPLETE CBC AUTOMATED: CPT | Performed by: INTERNAL MEDICINE

## 2024-04-17 PROCEDURE — 25010000002 MAGNESIUM SULFATE 2 GM/50ML SOLUTION: Performed by: INTERNAL MEDICINE

## 2024-04-17 PROCEDURE — 99232 SBSQ HOSP IP/OBS MODERATE 35: CPT | Performed by: INTERNAL MEDICINE

## 2024-04-17 PROCEDURE — 25010000002 CEFTRIAXONE PER 250 MG: Performed by: INTERNAL MEDICINE

## 2024-04-17 PROCEDURE — 80048 BASIC METABOLIC PNL TOTAL CA: CPT | Performed by: INTERNAL MEDICINE

## 2024-04-17 RX ORDER — POTASSIUM CHLORIDE 1.5 G/1.58G
40 POWDER, FOR SOLUTION ORAL EVERY 4 HOURS
Status: COMPLETED | OUTPATIENT
Start: 2024-04-17 | End: 2024-04-17

## 2024-04-17 RX ORDER — MAGNESIUM SULFATE HEPTAHYDRATE 40 MG/ML
2 INJECTION, SOLUTION INTRAVENOUS
Status: COMPLETED | OUTPATIENT
Start: 2024-04-17 | End: 2024-04-17

## 2024-04-17 RX ADMIN — MAGNESIUM SULFATE HEPTAHYDRATE 2 G: 2 INJECTION, SOLUTION INTRAVENOUS at 11:34

## 2024-04-17 RX ADMIN — SERTRALINE HYDROCHLORIDE 25 MG: 25 TABLET ORAL at 08:51

## 2024-04-17 RX ADMIN — MAGNESIUM SULFATE HEPTAHYDRATE 2 G: 2 INJECTION, SOLUTION INTRAVENOUS at 14:00

## 2024-04-17 RX ADMIN — SODIUM CHLORIDE 1000 ML: 9 INJECTION, SOLUTION INTRAVENOUS at 08:45

## 2024-04-17 RX ADMIN — HEPARIN SODIUM 5000 UNITS: 5000 INJECTION INTRAVENOUS; SUBCUTANEOUS at 06:16

## 2024-04-17 RX ADMIN — LEVOTHYROXINE SODIUM 112 MCG: 112 TABLET ORAL at 06:17

## 2024-04-17 RX ADMIN — HEPARIN SODIUM 5000 UNITS: 5000 INJECTION INTRAVENOUS; SUBCUTANEOUS at 22:51

## 2024-04-17 RX ADMIN — SODIUM CHLORIDE 1000 MG: 900 INJECTION INTRAVENOUS at 08:51

## 2024-04-17 RX ADMIN — Medication 10 ML: at 08:45

## 2024-04-17 RX ADMIN — MAGNESIUM SULFATE HEPTAHYDRATE 2 G: 2 INJECTION, SOLUTION INTRAVENOUS at 15:59

## 2024-04-17 RX ADMIN — POTASSIUM CHLORIDE 40 MEQ: 1.5 POWDER, FOR SOLUTION ORAL at 11:35

## 2024-04-17 RX ADMIN — Medication 10 ML: at 22:51

## 2024-04-17 RX ADMIN — POTASSIUM CHLORIDE 40 MEQ: 1.5 POWDER, FOR SOLUTION ORAL at 15:58

## 2024-04-17 RX ADMIN — HEPARIN SODIUM 5000 UNITS: 5000 INJECTION INTRAVENOUS; SUBCUTANEOUS at 15:58

## 2024-04-17 RX ADMIN — ASPIRIN 81 MG: 81 TABLET, CHEWABLE ORAL at 08:51

## 2024-04-17 NOTE — PROGRESS NOTES
"Nutrition Services    Patient Name:  Nehal Parker  YOB: 1926  MRN: [id number]  Admit Date:  4/15/2024    Patient identified at nutrition risk for chewing/swallowing issues. SLP completed, placed on regular diet with thin liquids. + \"Unsure\" weight loss.  Review of EMR indicate weight stable x 1 year. Patient not able to contribute to weight history or intake at time of visit.  Will monitor intake trend. Patient does Pt does not currently meet screen criteria for nutrition risk. RD will continue to follow per protocol.      Electronically signed by:  Lazara Valdez RD  04/16/24 20:44 EDT   "

## 2024-04-17 NOTE — PLAN OF CARE
Goal Outcome Evaluation:           Problem: Adult Inpatient Plan of Care  Goal: Plan of Care Review  Outcome: Ongoing, Progressing  Goal: Patient-Specific Goal (Individualized)  Outcome: Ongoing, Progressing  Goal: Absence of Hospital-Acquired Illness or Injury  Outcome: Ongoing, Progressing  Goal: Optimal Comfort and Wellbeing  Outcome: Ongoing, Progressing  Goal: Readiness for Transition of Care  Outcome: Ongoing, Progressing     Problem: Skin Injury Risk Increased  Goal: Skin Health and Integrity  Outcome: Ongoing, Progressing     Problem: Fall Injury Risk  Goal: Absence of Fall and Fall-Related Injury  Outcome: Ongoing, Progressing

## 2024-04-17 NOTE — CASE MANAGEMENT/SOCIAL WORK
Continued Stay Note  [facility]     Patient Name: Nehal Parker  MRN: [id number]  Today's Date: 4/17/2024    Admit Date: 4/15/2024    Plan: Karen Barahona   Discharge Plan       Row Name 04/17/24 1327       Plan    Plan Comments [facility]  ambulance will transfer patient at 1345.  will continue to follow for any further discharge needs.    Final Discharge Disposition Code 01 - home or self-care                   Discharge Codes    No documentation.                 Expected Discharge Date and Time       Expected Discharge Date Expected Discharge Time    Apr 17, 2024               Tanya Marinelli RN

## 2024-04-17 NOTE — PROGRESS NOTES
[facility] Medicine Services  PROGRESS NOTE    Patient Name: Nehal Parker  : 2/15/1926  MRN: [id number]    Date of Admission: 4/15/2024  Primary Care Physician: Brenton Clay MD    Subjective   Subjective     CC:  Confusion, partially treated uti    HPI:  Denies pain  Poor appetite but no nausea  No n/v/d currently  Overall feeling better today      Objective   Objective     Vital Signs:   Temp:  [97.6 °F (36.4 °C)-97.7 °F (36.5 °C)] 97.6 °F (36.4 °C)  Heart Rate:  [78] 78  Resp:  [19-20] 19  BP: (129-155)/(73-98) 155/84     Physical Exam:  Constitutional: a&ox3, nontoxic, elderly & frail  Psych:Normal/appropriate affect  HEENT:NCAT, oropharynx clear  Neck: neck supple, full range of motion  Neuro: Face symmetric, speech clear, equal , moves all extremities  Cardiac: rrr  Resp: ctab  GI: abd soft, nontender to palpation  Skin: No extremity rash  Musculoskeletal/extremities: no cyanosis of extremities; no significant ankle edema          Results Reviewed:  LAB RESULTS:      Lab 24  0756 04/15/24  1610 04/15/24  1132   WBC 10.30 11.78*  --  11.71*   HEMOGLOBIN 11.2* 13.0  --  12.2   HEMATOCRIT 36.5 43.6  --  38.2   PLATELETS 161 117*  --  142   NEUTROS ABS  --   --   --  9.84*   IMMATURE GRANS (ABS)  --   --   --  0.06*   LYMPHS ABS  --   --   --  0.62*   MONOS ABS  --   --   --  0.57   EOS ABS  --   --   --  0.58*   MCV 90.8 95.8  --  89.7   LACTATE  --   --  1.4 2.2*         Lab 24  0408 24  2218 24  0756 04/15/24  1132   SODIUM 140  --  139 140   POTASSIUM 3.6 3.5 3.5 4.1   CHLORIDE 112*  --  108* 105   CO2 17.0*  --  20.0* 27.0   ANION GAP 11.0  --  11.0 8.0   BUN 12  --  17 27*   CREATININE 0.29*  --  0.33* 0.55*   EGFR 96.8  --  93.8 83.0   GLUCOSE 88  --  79 163*   CALCIUM 8.3  --  8.6 10.0*   MAGNESIUM 1.5*  --  1.6* 2.0   TSH  --   --  2.540  --          Lab 24  0756 04/15/24  1132   TOTAL PROTEIN 5.9* 6.7   ALBUMIN 3.0*  3.3*   GLOBULIN 2.9 3.4   ALT (SGPT) 80* 102*   AST (SGOT) 101* 113*   BILIRUBIN 0.3 0.4   ALK PHOS 353* 393*   LIPASE  --  119*         Lab 04/15/24  1132   HSTROP T 13                 Brief Urine Lab Results  (Last result in the past 365 days)        Color   Clarity   Blood   Leuk Est   Nitrite   Protein   CREAT   Urine HCG        04/15/24 1155 Dark Yellow   Cloudy   Negative   Small (1+)   Negative   30 mg/dL (1+)                   Microbiology Results Abnormal       Procedure Component Value - Date/Time    Urine Culture - Urine, Urine, Catheter [[id number]] Collected: 04/15/24 1155    Lab Status: Final result Specimen: Urine, Catheter Updated: 04/16/24 2052     Urine Culture 50,000 CFU/mL Mixed Leola Isolated    Narrative:      Specimen contains mixed organisms of questionable pathogenicity suggestive of contamination. If symptoms persist, suggest recollection.  Colonization of the urinary tract without infection is common. Treatment is discouraged unless the patient is symptomatic, pregnant, or undergoing an invasive urologic procedure.    Blood Culture - Blood, Hand, Left [[id number]]  (Normal) Collected: 04/15/24 1610    Lab Status: Preliminary result Specimen: Blood from Hand, Left Updated: 04/16/24 1631     Blood Culture No growth at 24 hours    Narrative:      Less than seven (7) mL's of blood was collected.  Insufficient quantity may yield false negative results.    Blood Culture - Blood, Arm, Left [[id number]]  (Normal) Collected: 04/15/24 1610    Lab Status: Preliminary result Specimen: Blood from Arm, Left Updated: 04/16/24 1631     Blood Culture No growth at 24 hours    Narrative:      Less than seven (7) mL's of blood was collected.  Insufficient quantity may yield false negative results.    MRSA Screen, PCR (Inpatient) - Swab, Nares [[id number]]  (Normal) Collected: 04/16/24 0641    Lab Status: Final result Specimen: Swab from Nares Updated: 04/16/24 0809     MRSA PCR Negative    Narrative:      The  negative predictive value of this diagnostic test is high and should only be used to consider de-escalating anti-MRSA therapy. A positive result may indicate colonization with MRSA and must be correlated clinically.  MRSA Negative    COVID PRE-OP / PRE-PROCEDURE SCREENING ORDER (NO ISOLATION) - Swab, Nasopharynx [[id number]]  (Normal) Collected: 04/15/24 1133    Lab Status: Final result Specimen: Swab from Nasopharynx Updated: 04/15/24 1236    Narrative:      The following orders were created for panel order COVID PRE-OP / PRE-PROCEDURE SCREENING ORDER (NO ISOLATION) - Swab, Nasopharynx.  Procedure                               Abnormality         Status                     ---------                               -----------         ------                     Respiratory Panel PCR w/...[[id number]]  Normal              Final result                 Please view results for these tests on the individual orders.    Respiratory Panel PCR w/COVID-19(SARS-CoV-2) MARY/SHIRAZ/PETER/PAD/COR/LUL[facility] In-House, NP Swab in UTM/VTM, 2 HR TAT - Swab, Nasopharynx [[id number]]  (Normal) Collected: 04/15/24 1133    Lab Status: Final result Specimen: Swab from Nasopharynx Updated: 04/15/24 1236     ADENOVIRUS, PCR Not Detected     Coronavirus 229E Not Detected     Coronavirus HKU1 Not Detected     Coronavirus NL63 Not Detected     Coronavirus OC43 Not Detected     COVID19 Not Detected     Human Metapneumovirus Not Detected     Human Rhinovirus/Enterovirus Not Detected     Influenza A PCR Not Detected     Influenza B PCR Not Detected     Parainfluenza Virus 1 Not Detected     Parainfluenza Virus 2 Not Detected     Parainfluenza Virus 3 Not Detected     Parainfluenza Virus 4 Not Detected     RSV, PCR Not Detected     Bordetella pertussis pcr Not Detected     Bordetella parapertussis PCR Not Detected     Chlamydophila pneumoniae PCR Not Detected     Mycoplasma pneumo by PCR Not Detected    Narrative:      In the setting of a positive respiratory panel  with a viral infection PLUS a negative procalcitonin without other underlying concern for bacterial infection, consider observing off antibiotics or discontinuation of antibiotics and continue supportive care. If the respiratory panel is positive for atypical bacterial infection (Bordetella pertussis, Chlamydophila pneumoniae, or Mycoplasma pneumoniae), consider antibiotic de-escalation to target atypical bacterial infection.            No radiology results from the last 24 hrs    Results for orders placed during the hospital encounter of 09/11/22    Adult Transthoracic Echo Complete W/ Cont if Necessary Per Protocol (With Agitated Saline)    Interpretation Summary  · Left ventricular wall thickness is consistent with mild concentric hypertrophy.  · Left ventricular ejection fraction appears to be 56 - 60%.  · Left atrial volume is mildly increased.  · There is calcification of the aortic valve.  · Mild dilation of the aortic root is present. Mild dilation of the sinuses of Valsalva is present. Mild dilation of the ascending aorta is present.  · MAC and calcification of subvalvular structures but no significant MS  · Mild MR      Current medications:  Scheduled Meds:aspirin, 81 mg, Oral, Daily  [Held by provider] atorvastatin, 40 mg, Oral, Nightly  cefTRIAXone, 1,000 mg, Intravenous, Q24H  heparin (porcine), 5,000 Units, Subcutaneous, Q8H  levothyroxine, 112 mcg, Oral, Q AM  [Held by provider] losartan, 50 mg, Oral, Q24H  magnesium sulfate, 2 g, Intravenous, Q2H  potassium chloride, 40 mEq, Oral, Q4H  sertraline, 25 mg, Oral, Daily  sodium chloride, 10 mL, Intravenous, Q12H  [Held by provider] terazosin, 1 mg, Oral, Nightly      Continuous Infusions:sodium chloride, 75 mL/hr, Last Rate: 75 mL/hr (04/16/24 3973)      PRN Meds:.  acetaminophen **OR** acetaminophen **OR** acetaminophen    senna-docusate sodium **AND** polyethylene glycol **AND** bisacodyl **AND** bisacodyl    Calcium Replacement - Follow Nurse / BPA  Driven Protocol    Magnesium Standard Dose Replacement - Follow Nurse / BPA Driven Protocol    ondansetron ODT **OR** ondansetron    Phosphorus Replacement - Follow Nurse / BPA Driven Protocol    Potassium Replacement - Follow Nurse / BPA Driven Protocol    [COMPLETED] Insert Peripheral IV **AND** sodium chloride    sodium chloride    sodium chloride    Assessment & Plan   Assessment & Plan     Active Hospital Problems    Diagnosis  POA    **Diverticulitis [K57.92]  Yes    History of CVA (cerebrovascular accident) [Z86.73]  Not Applicable    Elevated liver enzymes [R74.8]  Yes    Essential hypertension [I10]  Yes    Hypothyroidism (acquired) [E03.9]  Yes    Generalized weakness [R53.1]  Yes      Resolved Hospital Problems   No resolved problems to display.        Brief Hospital Course to date:  Nehal Parker is a 89 y.o. female resident of [facility] w/ previous cva, bedbound status (uses wheelchair at baseline), hypothyroidism w/ recent diagnosis of uti at facility, treated w/ bactrim for couple of days there. Patient presented w/ worsening somnolence/confusion and leukocytosis.     Generalized weakness  Probable partially treated UTI  Questionable mild diverticulitis (on ct scan)  -ct mentioned possible mild diverticulitis but clinically not c/w diverticulitis  -had been diagnosed w/ uti at nursing facility and treated w/ bactrim for couple of days prior to admission  -presented w/ somnolence/confusion  -u/a here not improvessive, but still likely represents partially treated uti. Initially started on azactam. Changed to rocephin (change to ceftin upon discharge)  -continue iv fluids  -continued overall improved mentation today  -d/w patient and daughter bedside 4/17/24: although improved overall, feeling quite fatigued today and feels would benefit from another day of iv fluids and antibiotics. Plan back to nursing facility tomorrow 4/18 if continues to improve      Elevated lft's  -improved;  holding statin    Hx CVA  HTN  -continue asa 81 (holding statin as ablve)    Hypothyroidism  -continue levothyroxine; tsh wnl    Am labs: cbc,bmp,mag     Expected Discharge Location and Transportation: back to [facility] via ambulance  Expected Discharge 4/18/24 @ 13:45 if continue to improve clinically  Expected Discharge Date: 4/18/2024; Expected Discharge Time:      DVT prophylaxis:  Medical DVT prophylaxis orders are present.         AM-PAC 6 Clicks Score (PT): 7 (04/16/24 1134)    CODE STATUS:   Code Status and Medical Interventions:   Ordered at: 04/15/24 1400     Medical Intervention Limits:    NO intubation (DNI)     Code Status (Patient has no pulse and is not breathing):    No CPR (Do Not Attempt to Resuscitate)     Medical Interventions (Patient has pulse or is breathing):    Limited Support       Carson Blankenship MD  04/17/24

## 2024-04-18 VITALS
HEART RATE: 75 BPM | DIASTOLIC BLOOD PRESSURE: 90 MMHG | RESPIRATION RATE: 16 BRPM | SYSTOLIC BLOOD PRESSURE: 158 MMHG | OXYGEN SATURATION: 96 % | WEIGHT: 120 LBS | BODY MASS INDEX: 21.26 KG/M2 | TEMPERATURE: 97.4 F | HEIGHT: 63 IN

## 2024-04-18 LAB
ANION GAP SERPL CALCULATED.3IONS-SCNC: 13 MMOL/L (ref 5–15)
BUN SERPL-MCNC: 8 MG/DL (ref 8–23)
BUN/CREAT SERPL: 33.3 (ref 7–25)
CALCIUM SPEC-SCNC: 7.7 MG/DL (ref 8.2–9.6)
CHLORIDE SERPL-SCNC: 113 MMOL/L (ref 98–107)
CO2 SERPL-SCNC: 16 MMOL/L (ref 22–29)
CREAT SERPL-MCNC: 0.24 MG/DL (ref 0.57–1)
DEPRECATED RDW RBC AUTO: 46.2 FL (ref 37–54)
EGFRCR SERPLBLD CKD-EPI 2021: 101.3 ML/MIN/1.73
ERYTHROCYTE [DISTWIDTH] IN BLOOD BY AUTOMATED COUNT: 14.5 % (ref 12.3–15.4)
GLUCOSE SERPL-MCNC: 66 MG/DL (ref 65–99)
HCT VFR BLD AUTO: 35.9 % (ref 34–46.6)
HGB BLD-MCNC: 11.7 G/DL (ref 12–15.9)
MAGNESIUM SERPL-MCNC: 2.2 MG/DL (ref 1.7–2.3)
MAGNESIUM SERPL-MCNC: 2.8 MG/DL (ref 1.7–2.3)
MCH RBC QN AUTO: 28.5 PG (ref 26.6–33)
MCHC RBC AUTO-ENTMCNC: 32.6 G/DL (ref 31.5–35.7)
MCV RBC AUTO: 87.3 FL (ref 79–97)
PLATELET # BLD AUTO: 204 10*3/MM3 (ref 140–450)
PMV BLD AUTO: 10.6 FL (ref 6–12)
POTASSIUM SERPL-SCNC: 3.9 MMOL/L (ref 3.5–5.2)
RBC # BLD AUTO: 4.11 10*6/MM3 (ref 3.77–5.28)
SODIUM SERPL-SCNC: 142 MMOL/L (ref 136–145)
WBC NRBC COR # BLD AUTO: 12.31 10*3/MM3 (ref 3.4–10.8)

## 2024-04-18 PROCEDURE — 25010000002 HEPARIN (PORCINE) PER 1000 UNITS: Performed by: INTERNAL MEDICINE

## 2024-04-18 PROCEDURE — 25010000002 CEFTRIAXONE PER 250 MG: Performed by: INTERNAL MEDICINE

## 2024-04-18 PROCEDURE — 92526 ORAL FUNCTION THERAPY: CPT

## 2024-04-18 PROCEDURE — 85027 COMPLETE CBC AUTOMATED: CPT | Performed by: INTERNAL MEDICINE

## 2024-04-18 PROCEDURE — 83735 ASSAY OF MAGNESIUM: CPT | Performed by: INTERNAL MEDICINE

## 2024-04-18 PROCEDURE — 99239 HOSP IP/OBS DSCHRG MGMT >30: CPT | Performed by: INTERNAL MEDICINE

## 2024-04-18 PROCEDURE — 80048 BASIC METABOLIC PNL TOTAL CA: CPT | Performed by: INTERNAL MEDICINE

## 2024-04-18 RX ORDER — ONDANSETRON 4 MG/1
4 TABLET, ORALLY DISINTEGRATING ORAL EVERY 6 HOURS PRN
Start: 2024-04-18

## 2024-04-18 RX ORDER — TERAZOSIN 1 MG/1
1 CAPSULE ORAL NIGHTLY
Qty: 90 CAPSULE | Refills: 0 | Status: SHIPPED | OUTPATIENT
Start: 2024-04-18

## 2024-04-18 RX ORDER — CEFUROXIME AXETIL 500 MG/1
250 TABLET ORAL 2 TIMES DAILY
Start: 2024-04-18 | End: 2024-04-21

## 2024-04-18 RX ADMIN — HEPARIN SODIUM 5000 UNITS: 5000 INJECTION INTRAVENOUS; SUBCUTANEOUS at 07:42

## 2024-04-18 RX ADMIN — LEVOTHYROXINE SODIUM 112 MCG: 112 TABLET ORAL at 07:42

## 2024-04-18 RX ADMIN — ASPIRIN 81 MG: 81 TABLET, CHEWABLE ORAL at 10:39

## 2024-04-18 RX ADMIN — SERTRALINE HYDROCHLORIDE 25 MG: 25 TABLET ORAL at 10:39

## 2024-04-18 RX ADMIN — Medication 10 ML: at 10:40

## 2024-04-18 RX ADMIN — SODIUM CHLORIDE 1000 MG: 900 INJECTION INTRAVENOUS at 10:39

## 2024-04-18 RX ADMIN — HEPARIN SODIUM 5000 UNITS: 5000 INJECTION INTRAVENOUS; SUBCUTANEOUS at 13:24

## 2024-04-18 NOTE — PLAN OF CARE
Goal Outcome Evaluation:  Plan of Care Reviewed With: patient                  Anticipated Discharge Disposition (SLP): No further SLP services warranted             Treatment Assessment (SLP): toleration of diet (04/18/24 1030)  Treatment Assessment Comments (SLP): Pt lethargic when SLP arrived to room but awoke for few bites of nehemiah cracker and sips of water. No clinical s/sxs aspiration w/ any, though pt having difficulty pulling liquid through straw. Provided adaptive cup for use. At this time, no further SLP intervention indicated. Will sign off. (04/18/24 1030)  Plan for Continued Treatment (SLP): treatment no longer indicated as all goals met (04/18/24 1030)

## 2024-04-18 NOTE — PLAN OF CARE
Goal Outcome Evaluation:           Problem: Adult Inpatient Plan of Care  Goal: Plan of Care Review  Outcome: Ongoing, Progressing  Goal: Patient-Specific Goal (Individualized)  Outcome: Ongoing, Progressing  Goal: Absence of Hospital-Acquired Illness or Injury  Outcome: Ongoing, Progressing  Intervention: Identify and Manage Fall Risk  Recent Flowsheet Documentation  Taken 4/18/2024 1000 by Florinda Devlin RN  Safety Promotion/Fall Prevention:   activity supervised   assistive device/personal items within reach   clutter free environment maintained   room organization consistent   safety round/check completed   toileting scheduled  Taken 4/18/2024 0800 by Florinda Devlin RN  Safety Promotion/Fall Prevention:   activity supervised   assistive device/personal items within reach   clutter free environment maintained   room organization consistent   safety round/check completed   toileting scheduled  Intervention: Prevent Skin Injury  Recent Flowsheet Documentation  Taken 4/18/2024 1000 by Florinda Devlin RN  Body Position:   weight shifting   turned   left  Skin Protection:   incontinence pads utilized   transparent dressing maintained  Taken 4/18/2024 0800 by Florinda Devlin RN  Body Position:   weight shifting   turned   right  Skin Protection:   incontinence pads utilized   transparent dressing maintained  Intervention: Prevent and Manage VTE (Venous Thromboembolism) Risk  Recent Flowsheet Documentation  Taken 4/18/2024 1000 by Florinda Devlin RN  Activity Management: activity encouraged  Taken 4/18/2024 0800 by Florinda Devlin RN  Activity Management: activity encouraged  Goal: Optimal Comfort and Wellbeing  Outcome: Ongoing, Progressing  Intervention: Monitor Pain and Promote Comfort  Recent Flowsheet Documentation  Taken 4/18/2024 1000 by Florinda Devlin RN  Pain Management Interventions:   care clustered   see MAR  Taken 4/18/2024 0800 by Florinda Devlin RN  Pain Management Interventions:   care  clustered   see MAR  Intervention: Provide Person-Centered Care  Recent Flowsheet Documentation  Taken 4/18/2024 1000 by Florinda Devlin, RN  Trust Relationship/Rapport:   care explained   choices provided  Taken 4/18/2024 0800 by Florinda Devlin, RN  Trust Relationship/Rapport:   care explained   choices provided  Goal: Readiness for Transition of Care  Outcome: Ongoing, Progressing

## 2024-04-18 NOTE — PLAN OF CARE
Problem: Adult Inpatient Plan of Care  Goal: Plan of Care Review  Outcome: Ongoing, Progressing  Goal: Patient-Specific Goal (Individualized)  Outcome: Ongoing, Progressing  Goal: Absence of Hospital-Acquired Illness or Injury  Outcome: Ongoing, Progressing  Goal: Optimal Comfort and Wellbeing  Outcome: Ongoing, Progressing  Goal: Readiness for Transition of Care  Outcome: Ongoing, Progressing     Problem: Skin Injury Risk Increased  Goal: Skin Health and Integrity  Outcome: Ongoing, Progressing     Problem: Fall Injury Risk  Goal: Absence of Fall and Fall-Related Injury  Outcome: Ongoing, Progressing     Problem: Hypertension Comorbidity  Goal: Blood Pressure in Desired Range  Outcome: Ongoing, Progressing   Goal Outcome Evaluation:

## 2024-04-18 NOTE — CASE MANAGEMENT/SOCIAL WORK
Case Management Discharge Note      Final Note: Patients plan is [facility] today 4/18.  ambulance will transport patient at 1345. Facility will access discharge summary from [facility]. Nurse to call report to [phone] ex [phone]. Patient is going to [address]  bed 2. Patient's daughter, Lashae, is aware. Patient and daughter are agreeable to plan. No further discharge needs identified.         Selected Continued Care - Admitted Since 4/15/2024       Destination Coordination complete.      Service Provider Selected Services Address Phone Fax Patient Preferred    [facility] [address] [facility] [address] [phone] --              Durable Medical Equipment    No services have been selected for the patient.                Dialysis/Infusion    No services have been selected for the patient.                Home Medical Care    No services have been selected for the patient.                Therapy    No services have been selected for the patient.                Community Resources    No services have been selected for the patient.                Community & DME    No services have been selected for the patient.                         Final Discharge Disposition Code: 01 - home or self-care

## 2024-04-18 NOTE — DISCHARGE SUMMARY
[facility]  DISCHARGE SUMMARY    Patient Name: Nehal Parker  : 2/15/1926  MRN: [id number]    Date of Admission: 4/15/2024 10:54 AM  Date of Discharge:  2024  Primary Care Physician: Brenton Clay MD    Consults       No orders found from 3/17/2024 to 2024.            Hospital Course     Presenting Problem:     Active Hospital Problems    Diagnosis  POA    **Diverticulitis [K57.92]  Yes    History of CVA (cerebrovascular accident) [Z86.73]  Not Applicable    Elevated liver enzymes [R74.8]  Yes    Essential hypertension [I10]  Yes    Hypothyroidism (acquired) [E03.9]  Yes    Generalized weakness [R53.1]  Yes      Resolved Hospital Problems   No resolved problems to display.      -----------------------final diagnoses--------------  Partially treated UTI (poa)  Generalized weakness  CT scan mentioning possibility of possible mild diverticulitis (but exam not clinically consistent w/ diverticulitis)  -do not feel clinically has diverticulitis, no n/v and  no abd pain  --had been diagnosed w/ uti at nursing facility and treated w/ bactrim for couple of days prior to admission  -presented w/ somnolence/confusion  -u/a here not improvessive, but still likely represents partially treated uti; initiated treated w/ azactam, changed to rocephin; discharging on 5 more doses of ceftin (urine culture grew 50,000cfu mixed oralia)  Elevated LFTs  -held statin, improved  Hypothyroidism  -continue levothyroxine; tsh wnl  Hx CVA  HTN  -asa  ----------------------------------------------------------    Hospital Course:  Nehal Parker is a 89 y.o. female resident of [facility] w/ previous cva, bedbound status (uses wheelchair at baseline), hypothyroidism w/ recent diagnosis of uti at facility, treated w/ bactrim for couple of days there. Patient presented w/ worsening somnolence/confusion and leukocytosis. Patient initially started on azactam and iv fluids,  mentation improved. After discussion w/ daughter (a retired nurse) feel most likely had partially treated uti upon admission (treated w/ bactrim prior to admission), exam not consistent w/ any abd pain or nausea. Patient still w/ periods of somnolence/fatigue but overall improved. Long discussion held w/ daughter Dea, due to advanced age plan is to discharge back to nursing facility to give best chance for recovery mentally in her normal environment on oral antibiotics. If no significant improvement in coming days daughter will consider hospice/palliative consultation. Patient id DNR/DNI, no heroics, no invasives      Discharge Follow Up Recommendations for outpatient labs/diagnostics:   Pcp within a week    Day of Discharge     HPI:   Fatigued but no pain, no dyspnea, no nausea. Tolerated some po    Review of Systems  No f/c  No chest pain  No dyspnea    Vital Signs:   Temp:  [96.4 °F (35.8 °C)-97.4 °F (36.3 °C)] 97.4 °F (36.3 °C)  Heart Rate:  [75] 75  Resp:  [16] 16  BP: (153-158)/(90) 158/90      Physical Exam:  Mildly drowsy upon arrival but awakens to conversation, elderly and chronically ill but nontoxic appearing, no distress  Ncat, oroph clear  Rrr  Lungs clear  Abd soft, nontender  No cce    Pertinent  and/or Most Recent Results     LAB RESULTS:      Lab 04/18/24 0415 04/17/24  0408 04/16/24  0756 04/15/24  1610 04/15/24  1132   WBC 12.31* 10.30 11.78*  --  11.71*   HEMOGLOBIN 11.7* 11.2* 13.0  --  12.2   HEMATOCRIT 35.9 36.5 43.6  --  38.2   PLATELETS 204 161 117*  --  142   NEUTROS ABS  --   --   --   --  9.84*   IMMATURE GRANS (ABS)  --   --   --   --  0.06*   LYMPHS ABS  --   --   --   --  0.62*   MONOS ABS  --   --   --   --  0.57   EOS ABS  --   --   --   --  0.58*   MCV 87.3 90.8 95.8  --  89.7   LACTATE  --   --   --  1.4 2.2*         Lab 04/18/24  0415 04/17/24  2346 04/17/24  2043 04/17/24  0408 04/16/24  2218 04/16/24  0756 04/15/24  1132   SODIUM 142  --   --  140  --  139 140   POTASSIUM  3.9  --  3.9 3.6 3.5 3.5 4.1   CHLORIDE 113*  --   --  112*  --  108* 105   CO2 16.0*  --   --  17.0*  --  20.0* 27.0   ANION GAP 13.0  --   --  11.0  --  11.0 8.0   BUN 8  --   --  12  --  17 27*   CREATININE 0.24*  --   --  0.29*  --  0.33* 0.55*   EGFR 101.3  --   --  96.8  --  93.8 83.0   GLUCOSE 66  --   --  88  --  79 163*   CALCIUM 7.7*  --   --  8.3  --  8.6 10.0*   MAGNESIUM 2.2 2.8*  --  1.5*  --  1.6* 2.0   TSH  --   --   --   --   --  2.540  --          Lab 04/16/24  0756 04/15/24  1132   TOTAL PROTEIN 5.9* 6.7   ALBUMIN 3.0* 3.3*   GLOBULIN 2.9 3.4   ALT (SGPT) 80* 102*   AST (SGOT) 101* 113*   BILIRUBIN 0.3 0.4   ALK PHOS 353* 393*   LIPASE  --  119*         Lab 04/15/24  1132   HSTROP T 13                 Brief Urine Lab Results  (Last result in the past 365 days)        Color   Clarity   Blood   Leuk Est   Nitrite   Protein   CREAT   Urine HCG        04/15/24 1155 Dark Yellow   Cloudy   Negative   Small (1+)   Negative   30 mg/dL (1+)                 Microbiology Results (last 10 days)       Procedure Component Value - Date/Time    CANDIDA AURIS SCREEN - Swab, Axilla Right, Axilla Left and Groin [[id number]]  (Normal) Collected: 04/16/24 1704    Lab Status: Preliminary result Specimen: Swab from Axilla Right, Axilla Left and Groin Updated: 04/17/24 1715     Tammie Auris Screen Culture No Candida auris isolated at 24 hours    MRSA Screen, PCR (Inpatient) - Swab, Nares [[id number]]  (Normal) Collected: 04/16/24 0641    Lab Status: Final result Specimen: Swab from Nares Updated: 04/16/24 0809     MRSA PCR Negative    Narrative:      The negative predictive value of this diagnostic test is high and should only be used to consider de-escalating anti-MRSA therapy. A positive result may indicate colonization with MRSA and must be correlated clinically.  MRSA Negative    Blood Culture - Blood, Hand, Left [[id number]]  (Normal) Collected: 04/15/24 1610    Lab Status: Preliminary result Specimen: Blood from  Hand, Left Updated: 04/17/24 1631     Blood Culture No growth at 2 days    Narrative:      Less than seven (7) mL's of blood was collected.  Insufficient quantity may yield false negative results.    Blood Culture - Blood, Arm, Left [[id number]]  (Normal) Collected: 04/15/24 1610    Lab Status: Preliminary result Specimen: Blood from Arm, Left Updated: 04/17/24 1631     Blood Culture No growth at 2 days    Narrative:      Less than seven (7) mL's of blood was collected.  Insufficient quantity may yield false negative results.    Urine Culture - Urine, Urine, Catheter [[id number]] Collected: 04/15/24 1155    Lab Status: Final result Specimen: Urine, Catheter Updated: 04/16/24 2052     Urine Culture 50,000 CFU/mL Mixed Leola Isolated    Narrative:      Specimen contains mixed organisms of questionable pathogenicity suggestive of contamination. If symptoms persist, suggest recollection.  Colonization of the urinary tract without infection is common. Treatment is discouraged unless the patient is symptomatic, pregnant, or undergoing an invasive urologic procedure.    COVID PRE-OP / PRE-PROCEDURE SCREENING ORDER (NO ISOLATION) - Swab, Nasopharynx [[id number]]  (Normal) Collected: 04/15/24 1133    Lab Status: Final result Specimen: Swab from Nasopharynx Updated: 04/15/24 1236    Narrative:      The following orders were created for panel order COVID PRE-OP / PRE-PROCEDURE SCREENING ORDER (NO ISOLATION) - Swab, Nasopharynx.  Procedure                               Abnormality         Status                     ---------                               -----------         ------                     Respiratory Panel PCR w/...[[id number]]  Normal              Final result                 Please view results for these tests on the individual orders.    Respiratory Panel PCR w/COVID-19(SARS-CoV-2) [facility] In-House, NP Swab in UTM/VTM, 2 HR TAT - Swab, Nasopharynx [[id number]]  (Normal) Collected: 04/15/24 1133    Lab  Status: Final result Specimen: Swab from Nasopharynx Updated: 04/15/24 1236     ADENOVIRUS, PCR Not Detected     Coronavirus 229E Not Detected     Coronavirus HKU1 Not Detected     Coronavirus NL63 Not Detected     Coronavirus OC43 Not Detected     COVID19 Not Detected     Human Metapneumovirus Not Detected     Human Rhinovirus/Enterovirus Not Detected     Influenza A PCR Not Detected     Influenza B PCR Not Detected     Parainfluenza Virus 1 Not Detected     Parainfluenza Virus 2 Not Detected     Parainfluenza Virus 3 Not Detected     Parainfluenza Virus 4 Not Detected     RSV, PCR Not Detected     Bordetella pertussis pcr Not Detected     Bordetella parapertussis PCR Not Detected     Chlamydophila pneumoniae PCR Not Detected     Mycoplasma pneumo by PCR Not Detected    Narrative:      In the setting of a positive respiratory panel with a viral infection PLUS a negative procalcitonin without other underlying concern for bacterial infection, consider observing off antibiotics or discontinuation of antibiotics and continue supportive care. If the respiratory panel is positive for atypical bacterial infection (Bordetella pertussis, Chlamydophila pneumoniae, or Mycoplasma pneumoniae), consider antibiotic de-escalation to target atypical bacterial infection.            CT Abdomen Pelvis Without Contrast    Result Date: 4/15/2024  CT ABDOMEN PELVIS WO CONTRAST Date of Exam: 4/15/2024 1:00 PM EDT Indication: confusion/abdominal pain. Comparison: CT of the abdomen and pelvis dated 9/13/2022 Technique: Axial CT images were obtained of the abdomen and pelvis without the administration of contrast. Reconstructed coronal and sagittal images were also obtained. Automated exposure control and iterative construction methods were used. Findings: Liver: The liver is unremarkable in morphology. Probable cysts within the left hepatic lobe. Further evaluation of the liver is limited without IV contrast. No biliary dilation is seen.  Gallbladder: Unremarkable. Pancreas: The pancreas is atrophic. Spleen: Unremarkable. Adrenal glands: Unremarkable. Genitourinary tract: The kidneys appear unremarkable. No hydronephrosis is seen. The visualized portions of the ureters are unremarkable. No urinary tract calculi are seen. Distal ureters and urinary bladder are partially obscured due to beam hardening artifact arising from bilateral hip arthroplasties. There appears to be a left adnexal cyst measuring approximately 2.7 cm. Gastrointestinal tract: Colonic diverticulosis is present. Questionable wall thickening within the proximal sigmoid colon versus underdistention (series 2, image 103. Mild sigmoid diverticulitis cannot be entirely excluded. Please correlate with patient's symptomatology. No free air or abscess formation is identified. Large hiatal hernia. No evidence of bowel obstruction. Appendix: The appendix is not identified. Other findings: No free air or free fluid. No pathologically enlarged lymph nodes are seen. Vascular calcifications are present. Bones and soft tissues: No acute osseous lesion is identified. Bones are demineralized. There are degenerative changes within the spine. 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 Superficial soft tissues demonstrate no acute abnormality. Lung bases: Large hiatal hernia is partially imaged. Mitral annulus calcification is noted. Scattered bibasilar fibrotic changes.     Impression: 1.Examination is limited due to lack of IV contrast administration. 2.Colonic diverticulosis. Questionable wall thickening versus underdistention of the proximal sigmoid colon. Mild diverticulitis may be considered in the appropriate clinical setting. Please correlate with patient's symptomatology. No free air or abscess  formation identified. 3.Large hiatal hernia. 4.Left adnexal cyst measuring approximately 2.7 cm. 5.Nonvisualized appendix. 6.Additional findings as detailed above. Electronically Signed: Mitesh  MD Mala  4/15/2024 1:13 PM EDT  Workstation ID: [id number]    XR Chest 1 View    Result Date: 4/15/2024  XR CHEST 1 VW Date of Exam: 4/15/2024 11:59 AM EDT Indication: dyspnea/fatigue Comparison: Chest radiograph 9/11/2022. Findings: Enlarged cardiac silhouette, unchanged. Thoracic aortic calcifications. Diffusely coarsened interstitium, unchanged from prior exam and likely related to chronic/senescent changes. No focal consolidation. No pleural effusion or pneumothorax. Osseous structures are unchanged. Hiatal hernia.     Impression: Chronic changes of the lungs without evidence of acute cardiopulmonary disease. Electronically Signed: Mickey Pressley MD  4/15/2024 12:09 PM EDT  Workstation ID: [id number]             Results for orders placed during the hospital encounter of 09/11/22    Adult Transthoracic Echo Complete W/ Cont if Necessary Per Protocol (With Agitated Saline)    Interpretation Summary  · Left ventricular wall thickness is consistent with mild concentric hypertrophy.  · Left ventricular ejection fraction appears to be 56 - 60%.  · Left atrial volume is mildly increased.  · There is calcification of the aortic valve.  · Mild dilation of the aortic root is present. Mild dilation of the sinuses of Valsalva is present. Mild dilation of the ascending aorta is present.  · MAC and calcification of subvalvular structures but no significant MS  · Mild MR      Plan for Follow-up of Pending Labs/Results:   Pending Labs       Order Current Status    Blood Culture - Blood, Arm, Left Preliminary result    Blood Culture - Blood, Hand, Left Preliminary result    CANDIDA AURIS SCREEN - Swab, Axilla Right, Axilla Left and Groin Preliminary result          Discharge Details        Discharge Medications        New Medications        Instructions Start Date   cefuroxime 500 MG tablet  Commonly known as: CEFTIN   250 mg, Oral, 2 Times Daily      ondansetron ODT 4 MG disintegrating tablet  Commonly known as: ZOFRAN-ODT   4  mg, Oral, Every 6 Hours PRN             Continue These Medications        Instructions Start Date   acetaminophen 500 MG tablet  Commonly known as: TYLENOL   1,000 mg, Oral, Every 6 Hours PRN      aspirin 81 MG chewable tablet   81 mg, Oral, Daily      carboxymethylcellulose sod 1 % gel eye gel   1 drop, Both Eyes, 2 Times Daily      Delsym Cough/Chest Congest DM 5-100 MG/5ML liquid  Generic drug: Dextromethorphan-guaiFENesin   10 mL, Oral, Every 6 Hours PRN      docusate sodium 100 MG capsule  Commonly known as: COLACE   100 mg, Oral, 2 Times Daily      famotidine 20 MG tablet  Commonly known as: PEPCID   20 mg, Oral, Nightly      lactulose 10 GM/15ML solution  Commonly known as: CHRONULAC   20 g, Oral, Every 6 Hours PRN      levothyroxine 150 MCG tablet  Commonly known as: SYNTHROID, LEVOTHROID   150 mcg, Oral, Daily      saccharomyces boulardii 250 MG capsule  Commonly known as: FLORASTOR   250 mg, Oral, 2 Times Daily      terazosin 1 MG capsule  Commonly known as: HYTRIN   1 mg, Oral, Nightly             Stop These Medications      nitrofurantoin (macrocrystal-monohydrate) 100 MG capsule  Commonly known as: MACROBID              Allergies   Allergen Reactions    Penicillins Anaphylaxis     Per [facility] records.   Has tolerated ceftriaxone 4/17/24    Levaquin [Levofloxacin] Other (See Comments)     Other      Sulfa Antibiotics Other (See Comments)     Other           Discharge Disposition:  Skilled Nursing Facility (DC - External)    Diet:  Hospital:  Diet Order   Procedures    Diet: Regular/House; Fluid Consistency: Thin (IDDSI 0)            Activity:             CODE STATUS:    Code Status and Medical Interventions:   Ordered at: 04/15/24 1400     Medical Intervention Limits:    NO intubation (DNI)     Code Status (Patient has no pulse and is not breathing):    No CPR (Do Not Attempt to Resuscitate)     Medical Interventions (Patient has pulse or is breathing):    Limited Support       Future Appointments   Date Time  Provider Department Center   4/18/2024  1:45 PM MED 7  [facility] Blank[facility]hip MD  04/18/24      Time Spent on Discharge:  I spent  35  minutes on this discharge activity which included: face-to-face encounter with the patient, reviewing the data in the system, coordination of the care with the nursing staff as well as consultants, documentation, and entering orders.

## 2024-04-18 NOTE — THERAPY TREATMENT NOTE
Acute Care - Speech Language Pathology   Swallow Treatment Note [facility]     Patient Name: Nehal Parker  : 2/15/1926  MRN: [id number]  Today's Date: 2024               Admit Date: 4/15/2024    Visit Dx:     ICD-10-CM ICD-9-CM   1. Acute diverticulitis  K57.92 562.11   2. Acute dehydration  E86.0 276.51   3. Elevated lipase  R74.8 790.5   4. Elevated LFTs  R79.89 790.6   5. Dysphagia, unspecified type  R13.10 787.20     Patient Active Problem List   Diagnosis    Generalized weakness    Acute CVA (cerebrovascular accident)    HLD (hyperlipidemia)    Essential hypertension    Hypothyroidism (acquired)    Mild depression    Cystitis    Diverticulitis    History of CVA (cerebrovascular accident)    Elevated liver enzymes     Past Medical History:   Diagnosis Date    Depression     Disease of thyroid gland     Hypertension      History reviewed. No pertinent surgical history.    SLP Recommendation and Plan     SLP Diet Recommendation: regular textures, thin liquids (24 1030)  Recommended Precautions and Strategies: upright posture during/after eating, general aspiration precautions, assist with feeding (24 1030)  SLP Rec. for Method of Medication Administration: meds whole, with thin liquids, with puree, as tolerated (crushed if needed) (24 1030)     Monitor for Signs of Aspiration: yes, notify SLP if any concerns (24 103)        Anticipated Discharge Disposition (SLP): No further SLP services warranted (24 1030)     Therapy Frequency (Swallow): evaluation only (24 103)     Oral Care Recommendations: Oral Care BID/PRN, Toothbrush (24 1030)        Daily Summary of Progress (SLP): progress toward functional goals is good (24 1030)               Treatment Assessment (SLP): toleration of diet (24 1030)  Treatment Assessment Comments (SLP): Pt lethargic when SLP arrived to room but awoke for few bites of nehemiah cracker and sips of water. No clinical s/sxs  aspiration w/ any, though pt having difficulty pulling liquid through straw. Provided adaptive cup for use. At this time, no further SLP intervention indicated. Will sign off. (04/18/24 1030)  Plan for Continued Treatment (SLP): treatment no longer indicated as all goals met (04/18/24 1030)         Plan of Care Reviewed With: patient      SWALLOW EVALUATION (Last 72 Hours)       SLP Adult Swallow Evaluation       Row Name 04/18/24 1030 04/16/24 0900                Rehab Evaluation    Document Type therapy note (daily note)  -MP evaluation  -AC       Subjective Information no complaints  -MP no complaints  -AC       Patient Observations alert;cooperative  -MP alert;cooperative  -AC       Patient/Family/Caregiver Comments/Observations No family present  -MP No family present.  -AC       Patient Effort good  -MP good  -AC          General Information    Patient Profile Reviewed -- yes  -AC       Pertinent History Of Current Problem -- Adm w/ diverticulitis, decreased responsiveness, weakness. Recent UTI/abx. CT abdomen: large HH. Hx CVA. Consulted 2' concern that pt coughed w/ meds overnight.  -AC       Current Method of Nutrition -- regular textures;thin liquids  -AC       Precautions/Limitations, Vision -- WFL;for purposes of eval  -AC       Precautions/Limitations, Hearing -- WFL;for purposes of eval  -AC       Prior Level of Function-Communication -- unknown  -AC       Prior Level of Function-Swallowing -- unknown  -AC       Plans/Goals Discussed with -- patient;agreed upon  -       Barriers to Rehab -- none identified  -       Patient's Goals for Discharge -- patient did not state  -AC          Pain    Additional Documentation Pain Scale: FACES Pre/Post-Treatment (Group)  -MP Pain Scale: FACES Pre/Post-Treatment (Group)  -AC          Pain Scale: FACES Pre/Post-Treatment    Pain: FACES Scale, Pretreatment 0-->no hurt  -MP 0-->no hurt  -AC       Posttreatment Pain Rating 0-->no hurt  -MP 0-->no hurt  -AC           Oral Motor Structure and Function    Dentition Assessment -- natural, present and adequate  -       Secretion Management -- WNL/WFL  -AC       Mucosal Quality -- dry  -       Volitional Swallow -- WFL  -       Volitional Cough -- [facility]  -          Oral Musculature and Cranial Nerve Assessment    Oral Motor General Assessment -- [facility]  -          General Eating/Swallowing Observations    Respiratory Support Currently in Use -- room air  -       Eating/Swallowing Skills -- fed by SLP;self-fed;needed assist  -       Positioning During Eating -- upright in bed  -       Utensils Used -- spoon;cup;straw  -       Consistencies Trialed -- thin liquids;pureed;regular textures  -          Clinical Swallow Eval    Oral Prep Phase -- [facility]  -       Oral Transit -- [facility]  -       Oral Residue -- [facility]  -       Pharyngeal Phase -- no overt signs/symptoms of pharyngeal impairment  even when pt pushed w/ consecutive trials  -       Esophageal Phase -- suspected esophageal impairment  -       Clinical Swallow Evaluation Summary -- General dysphagia risk 2' lethargy/cognitive status.  -          Esophageal Phase Concerns    Esophageal Phase Concerns -- belching  -       Belching -- --  occasionally t/o eval  -          Swallowing Quality of Life Assessment    Education and counseling provided -- Risks of aspiration;Aspiration precautions;Feeding assistance and techniques  PCT arrived to feed pt breakfast  -          SLP Evaluation Clinical Impression    SLP Swallowing Diagnosis -- R/O pharyngeal dysphagia  -       Functional Impact -- risk of aspiration/pneumonia  -       Rehab Potential/Prognosis, Swallowing -- good, to achieve stated therapy goals  -       Swallow Criteria for Skilled Therapeutic Interventions Met -- demonstrates skilled criteria  -          SLP Treatment Clinical Impressions    Treatment Assessment (SLP) toleration of diet  -MP --       Treatment Assessment Comments (SLP) Pt  lethargic when SLP arrived to room but awoke for few bites of nehemiah cracker and sips of water. No clinical s/sxs aspiration w/ any, though pt having difficulty pulling liquid through straw. Provided adaptive cup for use. At this time, no further SLP intervention indicated. Will sign off.  -MP --       Daily Summary of Progress (SLP) progress toward functional goals is good  -MP --       Plan for Continued Treatment (SLP) treatment no longer indicated as all goals met  -MP --       Care Plan Review care plan/treatment goals reviewed;patient/other agree to care plan  -MP --          Recommendations    Therapy Frequency (Swallow) evaluation only  - PRN  -       Predicted Duration Therapy Intervention (Days) -- until discharge  -AC       SLP Diet Recommendation regular textures;thin liquids  -MP regular textures;thin liquids  -       Recommended Precautions and Strategies upright posture during/after eating;general aspiration precautions;assist with feeding  -MP upright posture during/after eating;general aspiration precautions;assist with feeding  -AC       Oral Care Recommendations Oral Care BID/PRN;Toothbrush  -MP Oral Care BID/PRN;Toothbrush  -AC       SLP Rec. for Method of Medication Administration meds whole;with thin liquids;with puree;as tolerated  crushed if needed  -MP meds whole;with thin liquids;with puree;as tolerated  -AC       Monitor for Signs of Aspiration yes;notify SLP if any concerns  -MP yes;notify SLP if any concerns  -AC       Anticipated Discharge Disposition (SLP) No further SLP services warranted  -MP home with home health  if needed @ time of d/c  -AC       Equipment Issued to Patient adaptive cup  -MP --                 User Key  (r) = Recorded By, (t) = Taken By, (c) = Cosigned By      Initials Name Effective Dates    AC Thais Tovar, MS CCC-SLP 02/03/23 -     MP Estrada Lindsey, MS CCC-SLP 12/28/21 -                     EDUCATION  The patient has been educated in the  following areas:   Dysphagia (Swallowing Impairment).        SLP GOALS       Row Name 04/18/24 1030 04/16/24 0900          (LTG) Patient will demonstrate functional swallow for    Diet Texture (Demonstrate functional swallow) regular textures  -MP regular textures  -AC     Liquid viscosity (Demonstrate functional swallow) thin liquids  -MP thin liquids  -AC     [place] (Demonstrate functional swallow) with 1:1 assist/ supervision  -MP with 1:1 assist/ supervision  -AC     Time Frame (Demonstrate functional swallow) 1 week  -MP 1 week  -AC     Progress/Outcomes (Demonstrate functional swallow) goal met  -MP --        (STG) Patient will tolerate trials of    Consistencies Trialed (Tolerate trials) regular textures;thin liquids  -MP regular textures;thin liquids  -AC     Desired Outcome (Tolerate trials) without signs/symptoms of aspiration;with adequate oral prep/transit/clearance  -MP without signs/symptoms of aspiration;with adequate oral prep/transit/clearance  -AC     [place] (Tolerate trials) with 1:1 assist/ supervision  -MP with 1:1 assist/ supervision  -AC     Time Frame (Tolerate trials) 1 week  -MP 1 week  -AC     Progress/Outcomes (Tolerate trials) goal met  -MP --               User Key  (r) = Recorded By, (t) = Taken By, (c) = Cosigned By      Initials Name Provider Type    AC Thais Tovar MS CCC-SLP Speech and Language Pathologist    Estrada Bassett MS CCC-SLP Speech and Language Pathologist                       Time Calculation:    Time Calculation- SLP       Row Name 04/18/24 1102             Time Calculation- SLP    SLP Start Time 1030  -MP      SLP Received On 04/18/24  -MP         Untimed Charges    [id number] Treatment Swallow Minutes 40  -MP         Total Minutes    Untimed Charges Total Minutes 40  -MP       Total Minutes 40  -MP                User Key  (r) = Recorded By, (t) = Taken By, (c) = Cosigned By      Initials Name Provider Type     Estrada Lindsey, MS  CCC-SLP Speech and Language Pathologist                    Therapy Charges for Today       Code Description Service Date Service Provider Modifiers Qty    [id number] HC ST TREATMENT SWALLOW 3 4/18/2024 Estrada Lindsey, MS CHAMBERLAIN-SLP GN 1                 MS RADHA Dickinson  4/18/2024

## 2024-04-20 LAB
BACTERIA SPEC AEROBE CULT: NORMAL
BACTERIA SPEC AEROBE CULT: NORMAL

## 2024-04-21 LAB — BACTERIA ISLT: NORMAL

## 2024-05-03 LAB
QT INTERVAL: 392 MS
QTC INTERVAL: 466 MS